# Patient Record
Sex: MALE | Race: WHITE | NOT HISPANIC OR LATINO | Employment: OTHER | URBAN - METROPOLITAN AREA
[De-identification: names, ages, dates, MRNs, and addresses within clinical notes are randomized per-mention and may not be internally consistent; named-entity substitution may affect disease eponyms.]

---

## 2024-04-24 ENCOUNTER — HOSPITAL ENCOUNTER (INPATIENT)
Facility: HOSPITAL | Age: 69
LOS: 2 days | Discharge: HOME | End: 2024-04-26
Attending: EMERGENCY MEDICINE | Admitting: INTERNAL MEDICINE

## 2024-04-24 ENCOUNTER — APPOINTMENT (OUTPATIENT)
Dept: CARDIOLOGY | Facility: HOSPITAL | Age: 69
End: 2024-04-24

## 2024-04-24 ENCOUNTER — APPOINTMENT (OUTPATIENT)
Dept: RADIOLOGY | Facility: HOSPITAL | Age: 69
End: 2024-04-24

## 2024-04-24 DIAGNOSIS — I44.2 ATRIOVENTRICULAR BLOCK, COMPLETE (MULTI): ICD-10-CM

## 2024-04-24 DIAGNOSIS — R00.1 SYMPTOMATIC BRADYCARDIA: Primary | ICD-10-CM

## 2024-04-24 LAB
ALBUMIN SERPL BCP-MCNC: 4.4 G/DL (ref 3.4–5)
ALP SERPL-CCNC: 116 U/L (ref 33–136)
ALT SERPL W P-5'-P-CCNC: 143 U/L (ref 10–52)
ANION GAP SERPL CALC-SCNC: 22 MMOL/L (ref 10–20)
AORTIC VALVE MEAN GRADIENT: 7.3 MMHG
AORTIC VALVE PEAK VELOCITY: 1.88 M/S
AST SERPL W P-5'-P-CCNC: 65 U/L (ref 9–39)
AV PEAK GRADIENT: 14.2 MMHG
AVA (PEAK VEL): 2.44 CM2
AVA (VTI): 2.15 CM2
BASE EXCESS BLDV CALC-SCNC: -8.4 MMOL/L (ref -2–3)
BASOPHILS # BLD AUTO: 0.05 X10*3/UL (ref 0–0.1)
BASOPHILS NFR BLD AUTO: 0.4 %
BILIRUB SERPL-MCNC: 0.5 MG/DL (ref 0–1.2)
BODY TEMPERATURE: ABNORMAL
BUN SERPL-MCNC: 22 MG/DL (ref 6–23)
CALCIUM SERPL-MCNC: 9.8 MG/DL (ref 8.6–10.3)
CARDIAC TROPONIN I PNL SERPL HS: 29 NG/L (ref 0–20)
CARDIAC TROPONIN I PNL SERPL HS: 32 NG/L (ref 0–20)
CHLORIDE SERPL-SCNC: 98 MMOL/L (ref 98–107)
CO2 SERPL-SCNC: 22 MMOL/L (ref 21–32)
CREAT SERPL-MCNC: 1.97 MG/DL (ref 0.5–1.3)
EGFRCR SERPLBLD CKD-EPI 2021: 37 ML/MIN/1.73M*2
EJECTION FRACTION APICAL 4 CHAMBER: 54.6
EOSINOPHIL # BLD AUTO: 0.01 X10*3/UL (ref 0–0.7)
EOSINOPHIL NFR BLD AUTO: 0.1 %
ERYTHROCYTE [DISTWIDTH] IN BLOOD BY AUTOMATED COUNT: 12.9 % (ref 11.5–14.5)
GLUCOSE SERPL-MCNC: 185 MG/DL (ref 74–99)
HCO3 BLDV-SCNC: 19.9 MMOL/L (ref 22–26)
HCT VFR BLD AUTO: 51.5 % (ref 41–52)
HGB BLD-MCNC: 16.5 G/DL (ref 13.5–17.5)
IMM GRANULOCYTES # BLD AUTO: 0.06 X10*3/UL (ref 0–0.7)
IMM GRANULOCYTES NFR BLD AUTO: 0.5 % (ref 0–0.9)
INHALED O2 CONCENTRATION: 95 %
LEFT ATRIUM VOLUME AREA LENGTH INDEX BSA: 23.5 ML/M2
LEFT VENTRICLE INTERNAL DIMENSION DIASTOLE: 4.33 CM (ref 3.5–6)
LEFT VENTRICULAR OUTFLOW TRACT DIAMETER: 2.01 CM
LEVETIRACETAM SERPL-MCNC: 20 UG/ML (ref 10–40)
LV EJECTION FRACTION BIPLANE: 58 %
LYMPHOCYTES # BLD AUTO: 2.09 X10*3/UL (ref 1.2–4.8)
LYMPHOCYTES NFR BLD AUTO: 17 %
MAGNESIUM SERPL-MCNC: 1.91 MG/DL (ref 1.6–2.4)
MCH RBC QN AUTO: 30.6 PG (ref 26–34)
MCHC RBC AUTO-ENTMCNC: 32 G/DL (ref 32–36)
MCV RBC AUTO: 96 FL (ref 80–100)
MITRAL VALVE E/A RATIO: 1.43
MITRAL VALVE E/E' RATIO: 7.43
MONOCYTES # BLD AUTO: 0.6 X10*3/UL (ref 0.1–1)
MONOCYTES NFR BLD AUTO: 4.9 %
NEUTROPHILS # BLD AUTO: 9.47 X10*3/UL (ref 1.2–7.7)
NEUTROPHILS NFR BLD AUTO: 77.1 %
NRBC BLD-RTO: 0 /100 WBCS (ref 0–0)
OXYHGB MFR BLDV: 20.1 % (ref 45–75)
PCO2 BLDV: 51 MM HG (ref 41–51)
PH BLDV: 7.2 PH (ref 7.33–7.43)
PLATELET # BLD AUTO: 405 X10*3/UL (ref 150–450)
PO2 BLDV: 18 MM HG (ref 35–45)
POTASSIUM SERPL-SCNC: 4.2 MMOL/L (ref 3.5–5.3)
PROT SERPL-MCNC: 7.3 G/DL (ref 6.4–8.2)
RBC # BLD AUTO: 5.39 X10*6/UL (ref 4.5–5.9)
RIGHT VENTRICLE FREE WALL PEAK S': 15 CM/S
RIGHT VENTRICLE PEAK SYSTOLIC PRESSURE: 25 MMHG
SAO2 % BLDV: 20 % (ref 45–75)
SODIUM SERPL-SCNC: 138 MMOL/L (ref 136–145)
TRICUSPID ANNULAR PLANE SYSTOLIC EXCURSION: 2.7 CM
WBC # BLD AUTO: 12.3 X10*3/UL (ref 4.4–11.3)

## 2024-04-24 PROCEDURE — 76937 US GUIDE VASCULAR ACCESS: CPT | Performed by: INTERNAL MEDICINE

## 2024-04-24 PROCEDURE — 84484 ASSAY OF TROPONIN QUANT: CPT | Performed by: EMERGENCY MEDICINE

## 2024-04-24 PROCEDURE — 96374 THER/PROPH/DIAG INJ IV PUSH: CPT

## 2024-04-24 PROCEDURE — 99291 CRITICAL CARE FIRST HOUR: CPT | Mod: 25 | Performed by: EMERGENCY MEDICINE

## 2024-04-24 PROCEDURE — C1894 INTRO/SHEATH, NON-LASER: HCPCS | Performed by: INTERNAL MEDICINE

## 2024-04-24 PROCEDURE — 71045 X-RAY EXAM CHEST 1 VIEW: CPT

## 2024-04-24 PROCEDURE — 2500000004 HC RX 250 GENERAL PHARMACY W/ HCPCS (ALT 636 FOR OP/ED): Performed by: EMERGENCY MEDICINE

## 2024-04-24 PROCEDURE — 83735 ASSAY OF MAGNESIUM: CPT | Performed by: EMERGENCY MEDICINE

## 2024-04-24 PROCEDURE — 2780000003 HC OR 278 NO HCPCS: Performed by: INTERNAL MEDICINE

## 2024-04-24 PROCEDURE — 96375 TX/PRO/DX INJ NEW DRUG ADDON: CPT

## 2024-04-24 PROCEDURE — 2500000004 HC RX 250 GENERAL PHARMACY W/ HCPCS (ALT 636 FOR OP/ED)

## 2024-04-24 PROCEDURE — 2500000005 HC RX 250 GENERAL PHARMACY W/O HCPCS: Performed by: INTERNAL MEDICINE

## 2024-04-24 PROCEDURE — 80177 DRUG SCRN QUAN LEVETIRACETAM: CPT | Mod: GEALAB | Performed by: EMERGENCY MEDICINE

## 2024-04-24 PROCEDURE — 33210 INSERT ELECTRD/PM CATH SNGL: CPT | Performed by: INTERNAL MEDICINE

## 2024-04-24 PROCEDURE — 99152 MOD SED SAME PHYS/QHP 5/>YRS: CPT | Performed by: INTERNAL MEDICINE

## 2024-04-24 PROCEDURE — 80053 COMPREHEN METABOLIC PANEL: CPT | Performed by: EMERGENCY MEDICINE

## 2024-04-24 PROCEDURE — 2500000001 HC RX 250 WO HCPCS SELF ADMINISTERED DRUGS (ALT 637 FOR MEDICARE OP)

## 2024-04-24 PROCEDURE — 99291 CRITICAL CARE FIRST HOUR: CPT | Performed by: INTERNAL MEDICINE

## 2024-04-24 PROCEDURE — 2500000004 HC RX 250 GENERAL PHARMACY W/ HCPCS (ALT 636 FOR OP/ED): Performed by: STUDENT IN AN ORGANIZED HEALTH CARE EDUCATION/TRAINING PROGRAM

## 2024-04-24 PROCEDURE — 5A1223Z PERFORMANCE OF CARDIAC PACING, CONTINUOUS: ICD-10-PCS | Performed by: STUDENT IN AN ORGANIZED HEALTH CARE EDUCATION/TRAINING PROGRAM

## 2024-04-24 PROCEDURE — 87081 CULTURE SCREEN ONLY: CPT | Mod: GEALAB

## 2024-04-24 PROCEDURE — 36415 COLL VENOUS BLD VENIPUNCTURE: CPT | Performed by: EMERGENCY MEDICINE

## 2024-04-24 PROCEDURE — 71045 X-RAY EXAM CHEST 1 VIEW: CPT | Performed by: RADIOLOGY

## 2024-04-24 PROCEDURE — C1751 CATH, INF, PER/CENT/MIDLINE: HCPCS | Performed by: INTERNAL MEDICINE

## 2024-04-24 PROCEDURE — 93306 TTE W/DOPPLER COMPLETE: CPT

## 2024-04-24 PROCEDURE — 2500000005 HC RX 250 GENERAL PHARMACY W/O HCPCS

## 2024-04-24 PROCEDURE — 2020000001 HC ICU ROOM DAILY

## 2024-04-24 PROCEDURE — 93010 ELECTROCARDIOGRAM REPORT: CPT | Performed by: INTERNAL MEDICINE

## 2024-04-24 PROCEDURE — 93005 ELECTROCARDIOGRAM TRACING: CPT

## 2024-04-24 PROCEDURE — 85025 COMPLETE CBC W/AUTO DIFF WBC: CPT | Performed by: EMERGENCY MEDICINE

## 2024-04-24 PROCEDURE — 82805 BLOOD GASES W/O2 SATURATION: CPT | Performed by: EMERGENCY MEDICINE

## 2024-04-24 PROCEDURE — S4991 NICOTINE PATCH NONLEGEND: HCPCS

## 2024-04-24 PROCEDURE — 2720000007 HC OR 272 NO HCPCS: Performed by: INTERNAL MEDICINE

## 2024-04-24 PROCEDURE — 2500000004 HC RX 250 GENERAL PHARMACY W/ HCPCS (ALT 636 FOR OP/ED): Performed by: INTERNAL MEDICINE

## 2024-04-24 PROCEDURE — 2500000002 HC RX 250 W HCPCS SELF ADMINISTERED DRUGS (ALT 637 FOR MEDICARE OP, ALT 636 FOR OP/ED)

## 2024-04-24 RX ORDER — SODIUM CHLORIDE 0.9 % (FLUSH) 0.9 %
10 SYRINGE (ML) INJECTION EVERY 12 HOURS
Status: DISCONTINUED | OUTPATIENT
Start: 2024-04-24 | End: 2024-04-25

## 2024-04-24 RX ORDER — LEVETIRACETAM 500 MG/1
TABLET ORAL
Status: COMPLETED
Start: 2024-04-24 | End: 2024-04-24

## 2024-04-24 RX ORDER — LEVETIRACETAM 15 MG/ML
INJECTION INTRAVASCULAR
Status: DISCONTINUED
Start: 2024-04-24 | End: 2024-04-24 | Stop reason: WASHOUT

## 2024-04-24 RX ORDER — NOREPINEPHRINE BITARTRATE 0.03 MG/ML
.01-1 INJECTION, SOLUTION INTRAVENOUS CONTINUOUS
Status: DISCONTINUED | OUTPATIENT
Start: 2024-04-24 | End: 2024-04-24

## 2024-04-24 RX ORDER — LAMOTRIGINE 100 MG/1
100 TABLET ORAL 2 TIMES DAILY
Status: DISCONTINUED | OUTPATIENT
Start: 2024-04-24 | End: 2024-04-24

## 2024-04-24 RX ORDER — ONDANSETRON HYDROCHLORIDE 2 MG/ML
4 INJECTION, SOLUTION INTRAVENOUS ONCE
Status: COMPLETED | OUTPATIENT
Start: 2024-04-24 | End: 2024-04-24

## 2024-04-24 RX ORDER — DOPAMINE HYDROCHLORIDE 160 MG/100ML
5-20 INJECTION, SOLUTION INTRAVENOUS CONTINUOUS
Status: DISCONTINUED | OUTPATIENT
Start: 2024-04-24 | End: 2024-04-24

## 2024-04-24 RX ORDER — DEXTROSE 50 % IN WATER (D50W) INTRAVENOUS SYRINGE
12.5
Status: DISCONTINUED | OUTPATIENT
Start: 2024-04-24 | End: 2024-04-26 | Stop reason: HOSPADM

## 2024-04-24 RX ORDER — ENOXAPARIN SODIUM 100 MG/ML
40 INJECTION SUBCUTANEOUS EVERY 24 HOURS
Status: DISCONTINUED | OUTPATIENT
Start: 2024-04-24 | End: 2024-04-26 | Stop reason: HOSPADM

## 2024-04-24 RX ORDER — LEVETIRACETAM 500 MG/1
500 TABLET ORAL 2 TIMES DAILY
COMMUNITY

## 2024-04-24 RX ORDER — DEXTROSE 50 % IN WATER (D50W) INTRAVENOUS SYRINGE
25
Status: DISCONTINUED | OUTPATIENT
Start: 2024-04-24 | End: 2024-04-26 | Stop reason: HOSPADM

## 2024-04-24 RX ORDER — NOREPINEPHRINE BITARTRATE 0.03 MG/ML
INJECTION, SOLUTION INTRAVENOUS
Status: COMPLETED
Start: 2024-04-24 | End: 2024-04-24

## 2024-04-24 RX ORDER — ONDANSETRON HYDROCHLORIDE 2 MG/ML
INJECTION, SOLUTION INTRAVENOUS
Status: COMPLETED
Start: 2024-04-24 | End: 2024-04-24

## 2024-04-24 RX ORDER — INSULIN LISPRO 100 [IU]/ML
0-5 INJECTION, SOLUTION INTRAVENOUS; SUBCUTANEOUS
Status: DISCONTINUED | OUTPATIENT
Start: 2024-04-25 | End: 2024-04-26 | Stop reason: HOSPADM

## 2024-04-24 RX ORDER — SODIUM CHLORIDE 0.9 % (FLUSH) 0.9 %
10 SYRINGE (ML) INJECTION AS NEEDED
Status: DISCONTINUED | OUTPATIENT
Start: 2024-04-24 | End: 2024-04-26 | Stop reason: HOSPADM

## 2024-04-24 RX ORDER — LORAZEPAM 2 MG/ML
INJECTION INTRAMUSCULAR
Status: DISCONTINUED
Start: 2024-04-24 | End: 2024-04-24 | Stop reason: WASHOUT

## 2024-04-24 RX ORDER — LIDOCAINE HYDROCHLORIDE 20 MG/ML
INJECTION, SOLUTION INFILTRATION; PERINEURAL AS NEEDED
Status: DISCONTINUED | OUTPATIENT
Start: 2024-04-24 | End: 2024-04-24 | Stop reason: HOSPADM

## 2024-04-24 RX ORDER — LEVETIRACETAM 500 MG/1
500 TABLET ORAL 2 TIMES DAILY
Status: DISCONTINUED | OUTPATIENT
Start: 2024-04-24 | End: 2024-04-26 | Stop reason: HOSPADM

## 2024-04-24 RX ORDER — SODIUM CHLORIDE 9 MG/ML
INJECTION, SOLUTION INTRAVENOUS CONTINUOUS PRN
Status: COMPLETED | OUTPATIENT
Start: 2024-04-24 | End: 2024-04-24

## 2024-04-24 RX ORDER — MIDAZOLAM HYDROCHLORIDE 1 MG/ML
INJECTION, SOLUTION INTRAMUSCULAR; INTRAVENOUS AS NEEDED
Status: DISCONTINUED | OUTPATIENT
Start: 2024-04-24 | End: 2024-04-24 | Stop reason: HOSPADM

## 2024-04-24 RX ORDER — IBUPROFEN 200 MG
1 TABLET ORAL DAILY
Status: DISCONTINUED | OUTPATIENT
Start: 2024-04-24 | End: 2024-04-26 | Stop reason: HOSPADM

## 2024-04-24 RX ORDER — FENTANYL CITRATE 50 UG/ML
INJECTION, SOLUTION INTRAMUSCULAR; INTRAVENOUS AS NEEDED
Status: DISCONTINUED | OUTPATIENT
Start: 2024-04-24 | End: 2024-04-24 | Stop reason: HOSPADM

## 2024-04-24 RX ORDER — LAMOTRIGINE 25 MG/1
75 TABLET ORAL 2 TIMES DAILY
Status: ON HOLD | COMMUNITY
End: 2024-05-03

## 2024-04-24 RX ORDER — LAMOTRIGINE 25 MG/1
50 TABLET ORAL 2 TIMES DAILY
Status: DISCONTINUED | OUTPATIENT
Start: 2024-04-25 | End: 2024-04-26 | Stop reason: HOSPADM

## 2024-04-24 RX ADMIN — ENOXAPARIN SODIUM 40 MG: 40 INJECTION SUBCUTANEOUS at 13:45

## 2024-04-24 RX ADMIN — ONDANSETRON HYDROCHLORIDE 4 MG: 2 INJECTION, SOLUTION INTRAVENOUS at 10:04

## 2024-04-24 RX ADMIN — TRIMETHOBENZAMIDE HYDROCHLORIDE 200 MG: 100 INJECTION INTRAMUSCULAR at 23:13

## 2024-04-24 RX ADMIN — DOPAMINE HYDROCHLORIDE 5 MCG/KG/MIN: 160 INJECTION, SOLUTION INTRAVENOUS at 10:35

## 2024-04-24 RX ADMIN — NICOTINE 1 PATCH: 14 PATCH, EXTENDED RELEASE TRANSDERMAL at 17:31

## 2024-04-24 RX ADMIN — LEVETIRACETAM 500 MG: 500 TABLET, FILM COATED ORAL at 18:17

## 2024-04-24 RX ADMIN — NOREPINEPHRINE BITARTRATE 0.01 MCG/KG/MIN: 0.03 INJECTION, SOLUTION INTRAVENOUS at 10:09

## 2024-04-24 RX ADMIN — SODIUM CHLORIDE 1000 ML: 9 INJECTION, SOLUTION INTRAVENOUS at 10:24

## 2024-04-24 RX ADMIN — LAMOTRIGINE 50 MG: 25 TABLET ORAL at 18:13

## 2024-04-24 RX ADMIN — Medication 10 ML: at 20:51

## 2024-04-24 RX ADMIN — ONDANSETRON 4 MG: 2 INJECTION INTRAMUSCULAR; INTRAVENOUS at 10:04

## 2024-04-24 SDOH — SOCIAL STABILITY: SOCIAL INSECURITY: HAVE YOU HAD ANY THOUGHTS OF HARMING ANYONE ELSE?: NO

## 2024-04-24 SDOH — SOCIAL STABILITY: SOCIAL INSECURITY: ARE YOU OR HAVE YOU BEEN THREATENED OR ABUSED PHYSICALLY, EMOTIONALLY, OR SEXUALLY BY ANYONE?: NO

## 2024-04-24 SDOH — SOCIAL STABILITY: SOCIAL INSECURITY: ARE THERE ANY APPARENT SIGNS OF INJURIES/BEHAVIORS THAT COULD BE RELATED TO ABUSE/NEGLECT?: NO

## 2024-04-24 SDOH — SOCIAL STABILITY: SOCIAL INSECURITY: DOES ANYONE TRY TO KEEP YOU FROM HAVING/CONTACTING OTHER FRIENDS OR DOING THINGS OUTSIDE YOUR HOME?: NO

## 2024-04-24 SDOH — SOCIAL STABILITY: SOCIAL INSECURITY: HAS ANYONE EVER THREATENED TO HURT YOUR FAMILY OR YOUR PETS?: NO

## 2024-04-24 SDOH — SOCIAL STABILITY: SOCIAL INSECURITY: DO YOU FEEL ANYONE HAS EXPLOITED OR TAKEN ADVANTAGE OF YOU FINANCIALLY OR OF YOUR PERSONAL PROPERTY?: NO

## 2024-04-24 SDOH — SOCIAL STABILITY: SOCIAL INSECURITY: WERE YOU ABLE TO COMPLETE ALL THE BEHAVIORAL HEALTH SCREENINGS?: YES

## 2024-04-24 SDOH — SOCIAL STABILITY: SOCIAL INSECURITY: ABUSE: ADULT

## 2024-04-24 SDOH — SOCIAL STABILITY: SOCIAL INSECURITY: HAVE YOU HAD THOUGHTS OF HARMING ANYONE ELSE?: NO

## 2024-04-24 SDOH — SOCIAL STABILITY: SOCIAL INSECURITY: DO YOU FEEL UNSAFE GOING BACK TO THE PLACE WHERE YOU ARE LIVING?: NO

## 2024-04-24 ASSESSMENT — LIFESTYLE VARIABLES
EVER HAD A DRINK FIRST THING IN THE MORNING TO STEADY YOUR NERVES TO GET RID OF A HANGOVER: NO
TOTAL SCORE: 0
HOW MANY STANDARD DRINKS CONTAINING ALCOHOL DO YOU HAVE ON A TYPICAL DAY: PATIENT DOES NOT DRINK
EVER FELT BAD OR GUILTY ABOUT YOUR DRINKING: NO
AUDIT-C TOTAL SCORE: 0
SKIP TO QUESTIONS 9-10: 1
HOW OFTEN DO YOU HAVE 6 OR MORE DRINKS ON ONE OCCASION: NEVER
HAVE PEOPLE ANNOYED YOU BY CRITICIZING YOUR DRINKING: NO
AUDIT-C TOTAL SCORE: 0
HAVE YOU EVER FELT YOU SHOULD CUT DOWN ON YOUR DRINKING: NO
HOW OFTEN DO YOU HAVE A DRINK CONTAINING ALCOHOL: NEVER

## 2024-04-24 ASSESSMENT — PATIENT HEALTH QUESTIONNAIRE - PHQ9
SUM OF ALL RESPONSES TO PHQ9 QUESTIONS 1 & 2: 0
1. LITTLE INTEREST OR PLEASURE IN DOING THINGS: NOT AT ALL
2. FEELING DOWN, DEPRESSED OR HOPELESS: NOT AT ALL

## 2024-04-24 ASSESSMENT — COGNITIVE AND FUNCTIONAL STATUS - GENERAL
DAILY ACTIVITIY SCORE: 24
MOBILITY SCORE: 24
PATIENT BASELINE BEDBOUND: NO

## 2024-04-24 ASSESSMENT — ACTIVITIES OF DAILY LIVING (ADL)
HEARING - RIGHT EAR: FUNCTIONAL
ADEQUATE_TO_COMPLETE_ADL: YES
JUDGMENT_ADEQUATE_SAFELY_COMPLETE_DAILY_ACTIVITIES: YES
BATHING: INDEPENDENT
HEARING - LEFT EAR: FUNCTIONAL
PATIENT'S MEMORY ADEQUATE TO SAFELY COMPLETE DAILY ACTIVITIES?: YES
GROOMING: INDEPENDENT
LACK_OF_TRANSPORTATION: NO
FEEDING YOURSELF: INDEPENDENT
DRESSING YOURSELF: INDEPENDENT
WALKS IN HOME: INDEPENDENT
TOILETING: INDEPENDENT

## 2024-04-24 ASSESSMENT — PAIN SCALES - GENERAL
PAINLEVEL_OUTOF10: 0 - NO PAIN
PAINLEVEL_OUTOF10: 3

## 2024-04-24 ASSESSMENT — PAIN - FUNCTIONAL ASSESSMENT
PAIN_FUNCTIONAL_ASSESSMENT: 0-10
PAIN_FUNCTIONAL_ASSESSMENT: 0-10

## 2024-04-24 ASSESSMENT — PAIN DESCRIPTION - LOCATION: LOCATION: NECK

## 2024-04-24 NOTE — Clinical Note
The ECG shows a paced rhythm. The patient has temporary pacemaker. Pacemaker at 5 mA. ECG rate  = 80 bpm.

## 2024-04-24 NOTE — Clinical Note
Temporary pacemaker inserted. Temporary pacemaker location through right internal jugular vein. Temporary pacemaker connected to demand mode. Heart rate: 50. Current 10 (mA).

## 2024-04-24 NOTE — ED PROCEDURE NOTE
Procedure  Critical Care    Performed by: Robert Hein MD  Authorized by: Robert Hein MD    Critical care provider statement:     Critical care time (minutes):  50    Critical care time was exclusive of:  Separately billable procedures and treating other patients    Critical care was necessary to treat or prevent imminent or life-threatening deterioration of the following conditions:  Circulatory failure    Critical care was time spent personally by me on the following activities:  Ordering and performing treatments and interventions, ordering and review of laboratory studies, ordering and review of radiographic studies, pulse oximetry, re-evaluation of patient's condition, transcutaneous pacing, examination of patient, discussions with consultants, development of treatment plan with patient or surrogate and obtaining history from patient or surrogate               Robert Hein MD  04/24/24 0965

## 2024-04-24 NOTE — Clinical Note
The PACEMAKER, GENERATOR, DUAL ASSURITY MRI - [id number] device was inserted. The leads were placed into the connector and visually verified to be in correct position. Injury current obtained.

## 2024-04-24 NOTE — CONSULTS
Inpatient consult to Cardiology  Consult performed by: Mellissa Jones PA-C  Consult ordered by: Robert Hein MD        History Of Present Illness:    Deonte Leavitt is a 69 y.o. male presenting with concern for seizure. Reports that he thought he was having a seizure for the last twelve hours. Upon EMS arrival patient was noted to be bradycardic with HR 20-30, was given Atropine en route without improvement. Upon evaluation in the ED patient being paced @ 70, without capture. Pulse rate @ @26. Was given 2L IVF bolus, started on Levophed with some improvement in BP. Pacing temporarily held with complete heart block noted. Pacing resumed, started on Dopamine with emergent transfer to cath lab for temporary pacing wire placed.   Tolerated the procedure well.      Last Recorded Vitals:  Vitals:    04/24/24 1112 04/24/24 1144 04/24/24 1230 04/24/24 1245   BP:  141/70  129/87   Pulse:  83 80 (!) 159   Resp:  23 23 21   Temp:   36.5 °C (97.7 °F)    TempSrc:   Temporal    SpO2: 94% 96% 93% 94%   Weight:       Height:           Last Labs:  CBC - 4/24/2024:  9:53 AM  12.3 16.5 405    51.5      CMP - 4/24/2024:  9:53 AM  9.8 7.3 65 --- 0.5   _ 4.4 143 116      PTT - No results in last year.  _   _ _     Troponin I, High Sensitivity   Date/Time Value Ref Range Status   04/24/2024 12:51 PM 32 (H) 0 - 20 ng/L Final   04/24/2024 09:53 AM 29 (H) 0 - 20 ng/L Final     LDL Calculated   Date/Time Value Ref Range Status   05/31/2022 12:47 PM 71 65 - 130 MG/DL Final   02/11/2022 03:43  (H) 65 - 130 MG/DL Final      Last I/O:  No intake/output data recorded.    Past Cardiology Tests (Last 3 Years):  EKG:  No results found for this or any previous visit from the past 1095 days.    Echo:  Transthoracic Echocardiogram (02/18/2022):   Exam quality: good  The left ventricular chamber size is normal.  There is normal left ventricular systolic function.  Estimated ejection fraction is 60%.  The left atrial size is normal.  No  "evidence of aortic regurgitation.  No mitral regurgitation.  No evidence of tricuspid regurgitation.      Ejection Fractions:  No results found for: \"EF\"  Cath:  Cardiac Catheterization Procedure 04/24/2024    Stress Test:  No results found for this or any previous visit from the past 1095 days.    Imaging:  CXR (04/24/24):   FINDINGS:  Cardiomegaly. No consolidation, effusion, edema, or pneumothorax.          Past Medical History:  He has a past medical history of Seizures (Multi).    Past Surgical History:  He has a past surgical history that includes Cardiac electrophysiology procedure (N/A, 4/24/2024).      Social History:  He reports that he has been smoking cigarettes. He has never used smokeless tobacco. He reports that he does not currently use alcohol.  Drug: Marijuana.    Family History:  No family history on file.     Allergies:  Patient has no known allergies.    Inpatient Medications:  Scheduled medications   Medication Dose Route Frequency    enoxaparin  40 mg subcutaneous q24h     PRN medications   Medication     Continuous Medications   Medication Dose Last Rate     Outpatient Medications:  Current Outpatient Medications   Medication Instructions    lamoTRIgine (LAMICTAL) 75 mg, oral, 2 times daily, Per pt, cut his dose down without speaking with provider    levETIRAcetam (KEPPRA) 500 mg, oral, 2 times daily       Physical Exam:  Physical Exam  Constitutional:       General: He is in acute distress.      Appearance: He is ill-appearing.   HENT:      Head: Normocephalic.      Nose: Nose normal.      Mouth/Throat:      Mouth: Mucous membranes are moist.   Cardiovascular:      Rate and Rhythm: Bradycardia present.   Pulmonary:      Effort: Respiratory distress present.   Musculoskeletal:      Right lower leg: No edema.      Left lower leg: No edema.   Skin:     Coloration: Skin is pale.   Neurological:      Mental Status: He is disoriented.            Assessment/Plan   Deonte Leavitt is a 68 yo male with a " PMH of Seizure disorder, Carotid stenosis who presented with symptomatic bradycardia, seizures and is s/p temporary pacing placement.     #Complete heart block  #Symptomatic bradycardia    -Echocardiogram recommended  -Temp wire placed  -NPO @ midnight for possible PPM placement  -EP notified  -No home AV carlos blocking agents or antiarrhythmic        Code Status:  Full Code    I spent  minutes in the professional and overall care of this patient.        Mellissa Jones PA-C

## 2024-04-24 NOTE — H&P
"Patient: Deonte Leavitt   Age: 69 y.o.   Gender: male     Room/Bed: 05/05-A     Attending: Nathan Novak MD  Code Status:  Full Code    Chief Complaint:  Patient: Deonte Leavitt is a 69 y.o. male with a past medical history significant for seizures who presented to the hospital after having a seizure.     History of Presenting Illness  He states that he has been dealing with seizure disorders for about 3 years. He has been having episodes of \"seizures\" sporadically during the last 3 years. He states that when he has an episode he will get lightheaded and dizzy and then won't remember what happened. He was started on Keppra but had a side effect to this medication. As a result of the ineffectiveness and side effects from this medication, the decision was made to transition him to Lamictal. Yesterday in the evening he started to have this lightheadedness and dizziness again, he didn't think much of it initially but after dealing with it again this morning he decided to come into the hospital.     Denies chest pain/pressure, abdominal pain, nausea, vomiting, fever, chills, headaches.     Past Medical History:  - Seizures  Past Surgical History:   - None  Family History:   - Non-contributory   Medications:  - Keppra (being weaned off)  - Lamictal 100 mg BID     Social History:   Tobacco Use: High Risk (4/24/2024)    Patient History     Smoking Tobacco Use: Every Day     Smokeless Tobacco Use: Never     Passive Exposure: Not on file      Social History     Substance and Sexual Activity   Alcohol Use Not Currently      ED Course   Vitals -   /87   Pulse (!) 159   Temp 36.5 °C (97.7 °F) (Temporal)   Resp 21   Wt 86.3 kg (190 lb 4.1 oz)   SpO2 94%     Labs:  CBC:  Recent Labs     04/24/24  0953 02/11/22  1543   WBC 12.3* 7.0   HGB 16.5 16.0   HCT 51.5 50.8*    441   MCV 96 96.0     CMP:  Recent Labs     04/24/24  0953 05/31/22  1247 02/11/22  1543    139 137   K 4.2 4.5 4.7   CL 98 99 100   CO2 22 28 " "26   ANIONGAP 22* 12 11   BUN 22 16 19   CREATININE 1.97* 1.2 1.1   EGFR 37* 67 75   GLUCOSE 185* 88 92     Recent Labs     04/24/24  0953 05/31/22  1247 02/11/22  1543   ALBUMIN 4.4 4.3 4.2   ALKPHOS 116 104 101   * 47* 29   AST 65* 25 20   BILITOT 0.5 0.3 0.3     Calcium/Phos:   Lab Results   Component Value Date    CALCIUM 9.8 04/24/2024      ENDO:  Recent Labs     02/11/22  1543   TSH 1.99      CARDIAC:   Recent Labs     04/24/24  1251 04/24/24  0953   TROPHS 32* 29*     Recent Labs     05/31/22  1247 02/11/22  1543   CHOL 159 235*   LDLCALC 71 153*   HDL 66 61   TRIG 110 106     Micro/ID:   No results found for the last 90 days.    Imaging:   - Chest X-Ray: cardiomegaly, but no evidence of acute intrathoracic abnormality.     Interventions: In the ED, the patient was noted to have bradycardia. Prior to arrival to the ED, the patient was given Atropine with minimal improvement. While in the ED, the patient was paced. Dr. Park was contacted in the ED and decision was made to take the patient to the Cath Lab for placement of a temporary pacemaker. Patient had a pacemaker placed on 4/24/2024 and was admitted into the ICU on a Dopamine and Levophed infusion.     Objective Data  Physical Exam  HENT:      Head: Normocephalic and atraumatic.        Comments: Transvenous pacemaker on right side, bandaging over, no swelling or bleeding noted     Mouth/Throat:      Mouth: Mucous membranes are moist.      Pharynx: Oropharynx is clear.   Pulmonary:      Effort: Pulmonary effort is normal.      Breath sounds: Normal breath sounds.   Abdominal:      General: Abdomen is flat. Bowel sounds are normal.      Palpations: Abdomen is soft.   Skin:     General: Skin is warm and dry.   Neurological:      Mental Status: He is alert and oriented to person, place, and time. Mental status is at baseline.          Visit Vitals  /87   Pulse (!) 159   Temp 36.5 °C (97.7 °F) (Temporal)   Resp 21   Ht 1.727 m (5' 8\")   Wt 86.3 " kg (190 lb 4.1 oz)   SpO2 94%   BMI 28.93 kg/m²   Smoking Status Every Day   BSA 2.03 m²        Intake/Output Summary (Last 24 hours) at 4/24/2024 1455  Last data filed at 4/24/2024 1445  Gross per 24 hour   Intake 2110.55 ml   Output 130 ml   Net 1980.55 ml           Current Inpatient Medications   Scheduled medications  enoxaparin, 40 mg, subcutaneous, q24h    Continuous medications     PRN medications     Assessment and Plan   Deonte Leavitt is a 69 y.o. male with a past medical history significant for seizures who presented to the hospital after having a seizure.     Neuro    # Seizure Disorder  - Weaning off of Keppra  - c/w Lamictal 50 mg BID + Keppra 500 mg BID    Cardiovascular    # Symptomatic Bradycardia  - s/p Temporary Pacemaker on 4/24/24  [ ] Repeat CXR in the AM  [ ] ECHO ordered and pending  [ ] Permanent Pacemaker placement   [ ] Cardiology consulted     Respiratory  - No active/acute issues     Gastrointestinal  - No active/acute issues     Renal/    # NESSA   2/2 Pre-Renal likely in the setting of dehydration  - Baseline Cr: 1.1  - Given 1L of NS  [ ] Continue to monitor     Heme/Onc  - No active/acute issues     Endocrine    # Hyperglycemia  [ ] Sliding Scale Insulin    Infectious Disease  - No active/acute issues     MSK/Derm  - No active/acute issues     Fluids: None  Electrolytes: replete as needed  Nutrition: Cardiac Diet  GI Prophylaxis: None  DVT Prophylaxis: Lovenox     Access: PIV's   Pressors: None   Sedation: None     Antibiotics: None  Oxygenation: 2L NC     Dispo: Admitted into the ICU for symptomatic bradycardia s/p pacemaker placement. Expected LOS > 48 hours.     Adam Aguirre DO  PGY-3, Internal Medicine

## 2024-04-24 NOTE — CARE PLAN
The clinical goals for the shift include Pt will remain safe and free of falls during this shift.

## 2024-04-24 NOTE — ED PROVIDER NOTES
HPI   Chief Complaint   Patient presents with    Bradycardia     Pt brought to Ed by [facility]. Called with c/o seizure.  Pt found to be bradycardic in the 20-30s by EMS and c/o sob.  10 second seizure observed by EMS.  Pt from home with wife. Hx of seizures - takes seizure meds.  Denies any meds  for heart.  Pt took 1 edible gummie and smoked pot yesterday.  1 mg atropine given by EMS en route.        Deonte is a 69-year-old male presents after having a seizure.  Has history of seizure disorder and he takes.  Seizure medications Keppra and something else.  EMS reports that his heart rate was very very slow in spite of the monitor reading faster heart rate he is not conducting.  He has low blood pressure as well.  He denies taking any blood pressure medicine.  He does use marijuana I smoked and Gummies both yesterday nothing since then.  He denies any history of heart problems but does see Dr. Latham heart doctor at another hospital.  Denies any fever chills cough or cold.  He denies any chest pain or shortness of breath.  He denies having symptoms like this in the past.  He denies any drug overdoses.                          [place] Coma Scale Score: 15                     Patient History   Past Medical History:   Diagnosis Date    Seizures (Multi)      History reviewed. No pertinent surgical history.  No family history on file.  Social History     Tobacco Use    Smoking status: Not on file    Smokeless tobacco: Not on file   Substance Use Topics    Alcohol use: Not on file    Drug use: Not on file       Physical Exam   ED Triage Vitals   Temp Pulse Resp BP   -- -- -- --      SpO2 Temp src Heart Rate Source Patient Position   -- -- -- --      BP Location FiO2 (%)     -- --       Physical Exam  Vitals reviewed.   Constitutional:       General: He is awake.      Appearance: Normal appearance.   HENT:      Head: Normocephalic.      Nose: Nose normal.   Cardiovascular:      Rate and Rhythm: Bradycardia present.    Pulmonary:      Effort: Pulmonary effort is normal.      Breath sounds: Normal breath sounds.   Abdominal:      Palpations: Abdomen is soft.   Musculoskeletal:      Cervical back: Normal range of motion.   Skin:     General: Skin is warm.   Neurological:      Mental Status: He is alert.         ED Course & MDM   ED Course as of 04/24/24 1052   Wed Apr 24, 2024   0943 Patient has very slow heart rate and is not conducting the beats.  He was given atropine with minimal improvement prehospital.  After evaluating the patient was decided to pace him.  He is conducting a 10 A at rate of 60.  Chest x-ray and labs requested I have placed a call for interventionalists, Dr. Park awaiting his call back. [RZ]   1005 Seen in the ED by Dr. Vince Castillo and Mellissa from the cardiology team and they requested we add Levophed and Zofran for vomiting.  They are determining plan.  Labs are pending. [RZ]   1051 By Dr. Park in the ED who would like to take the patient to the Cath Lab to put a temporary pacemaker in.  He talked to the patient and his wife in the room.  Patient is agreeable. [RZ]      ED Course User Index  [RZ] Robert Hein MD         Diagnoses as of 04/24/24 1052   Symptomatic bradycardia       Medical Decision Making      Procedure  Procedures     Robert Hein MD  04/24/24 1052

## 2024-04-25 ENCOUNTER — APPOINTMENT (OUTPATIENT)
Dept: RADIOLOGY | Facility: HOSPITAL | Age: 69
End: 2024-04-25

## 2024-04-25 PROBLEM — I44.2 ATRIOVENTRICULAR BLOCK, COMPLETE (MULTI): Status: ACTIVE | Noted: 2024-04-24

## 2024-04-25 LAB
ALBUMIN SERPL BCP-MCNC: 3.6 G/DL (ref 3.4–5)
ANION GAP SERPL CALC-SCNC: 10 MMOL/L (ref 10–20)
BUN SERPL-MCNC: 19 MG/DL (ref 6–23)
CALCIUM SERPL-MCNC: 8.6 MG/DL (ref 8.6–10.3)
CHLORIDE SERPL-SCNC: 104 MMOL/L (ref 98–107)
CO2 SERPL-SCNC: 28 MMOL/L (ref 21–32)
CREAT SERPL-MCNC: 1.15 MG/DL (ref 0.5–1.3)
EGFRCR SERPLBLD CKD-EPI 2021: 70 ML/MIN/1.73M*2
ERYTHROCYTE [DISTWIDTH] IN BLOOD BY AUTOMATED COUNT: 13 % (ref 11.5–14.5)
GLUCOSE BLD MANUAL STRIP-MCNC: 169 MG/DL (ref 74–99)
GLUCOSE BLD MANUAL STRIP-MCNC: 92 MG/DL (ref 74–99)
GLUCOSE SERPL-MCNC: 82 MG/DL (ref 74–99)
HCT VFR BLD AUTO: 44.8 % (ref 41–52)
HGB BLD-MCNC: 14.4 G/DL (ref 13.5–17.5)
MAGNESIUM SERPL-MCNC: 1.78 MG/DL (ref 1.6–2.4)
MCH RBC QN AUTO: 30.1 PG (ref 26–34)
MCHC RBC AUTO-ENTMCNC: 32.1 G/DL (ref 32–36)
MCV RBC AUTO: 94 FL (ref 80–100)
NRBC BLD-RTO: 0 /100 WBCS (ref 0–0)
PHOSPHATE SERPL-MCNC: 3.1 MG/DL (ref 2.5–4.9)
PLATELET # BLD AUTO: 281 X10*3/UL (ref 150–450)
POTASSIUM SERPL-SCNC: 3.9 MMOL/L (ref 3.5–5.3)
RBC # BLD AUTO: 4.78 X10*6/UL (ref 4.5–5.9)
SODIUM SERPL-SCNC: 138 MMOL/L (ref 136–145)
WBC # BLD AUTO: 12.9 X10*3/UL (ref 4.4–11.3)

## 2024-04-25 PROCEDURE — 80069 RENAL FUNCTION PANEL: CPT

## 2024-04-25 PROCEDURE — 3E0102A INTRODUCTION OF ANTI-INFECTIVE ENVELOPE INTO SUBCUTANEOUS TISSUE, OPEN APPROACH: ICD-10-PCS | Performed by: STUDENT IN AN ORGANIZED HEALTH CARE EDUCATION/TRAINING PROGRAM

## 2024-04-25 PROCEDURE — 02H63JZ INSERTION OF PACEMAKER LEAD INTO RIGHT ATRIUM, PERCUTANEOUS APPROACH: ICD-10-PCS | Performed by: STUDENT IN AN ORGANIZED HEALTH CARE EDUCATION/TRAINING PROGRAM

## 2024-04-25 PROCEDURE — C1892 INTRO/SHEATH,FIXED,PEEL-AWAY: HCPCS | Performed by: STUDENT IN AN ORGANIZED HEALTH CARE EDUCATION/TRAINING PROGRAM

## 2024-04-25 PROCEDURE — 99291 CRITICAL CARE FIRST HOUR: CPT | Performed by: INTERNAL MEDICINE

## 2024-04-25 PROCEDURE — 71045 X-RAY EXAM CHEST 1 VIEW: CPT | Performed by: STUDENT IN AN ORGANIZED HEALTH CARE EDUCATION/TRAINING PROGRAM

## 2024-04-25 PROCEDURE — 99153 MOD SED SAME PHYS/QHP EA: CPT | Performed by: STUDENT IN AN ORGANIZED HEALTH CARE EDUCATION/TRAINING PROGRAM

## 2024-04-25 PROCEDURE — 33208 INSRT HEART PM ATRIAL & VENT: CPT | Performed by: STUDENT IN AN ORGANIZED HEALTH CARE EDUCATION/TRAINING PROGRAM

## 2024-04-25 PROCEDURE — C1785 PMKR, DUAL, RATE-RESP: HCPCS | Performed by: STUDENT IN AN ORGANIZED HEALTH CARE EDUCATION/TRAINING PROGRAM

## 2024-04-25 PROCEDURE — 2500000004 HC RX 250 GENERAL PHARMACY W/ HCPCS (ALT 636 FOR OP/ED): Mod: JZ | Performed by: STUDENT IN AN ORGANIZED HEALTH CARE EDUCATION/TRAINING PROGRAM

## 2024-04-25 PROCEDURE — 2020000001 HC ICU ROOM DAILY

## 2024-04-25 PROCEDURE — 33210 INSERT ELECTRD/PM CATH SNGL: CPT | Performed by: STUDENT IN AN ORGANIZED HEALTH CARE EDUCATION/TRAINING PROGRAM

## 2024-04-25 PROCEDURE — 85027 COMPLETE CBC AUTOMATED: CPT

## 2024-04-25 PROCEDURE — 2500000001 HC RX 250 WO HCPCS SELF ADMINISTERED DRUGS (ALT 637 FOR MEDICARE OP)

## 2024-04-25 PROCEDURE — 82947 ASSAY GLUCOSE BLOOD QUANT: CPT

## 2024-04-25 PROCEDURE — 71045 X-RAY EXAM CHEST 1 VIEW: CPT

## 2024-04-25 PROCEDURE — 83735 ASSAY OF MAGNESIUM: CPT

## 2024-04-25 PROCEDURE — 99152 MOD SED SAME PHYS/QHP 5/>YRS: CPT | Performed by: STUDENT IN AN ORGANIZED HEALTH CARE EDUCATION/TRAINING PROGRAM

## 2024-04-25 PROCEDURE — 99223 1ST HOSP IP/OBS HIGH 75: CPT | Performed by: STUDENT IN AN ORGANIZED HEALTH CARE EDUCATION/TRAINING PROGRAM

## 2024-04-25 PROCEDURE — 2500000004 HC RX 250 GENERAL PHARMACY W/ HCPCS (ALT 636 FOR OP/ED)

## 2024-04-25 PROCEDURE — 2720000007 HC OR 272 NO HCPCS: Performed by: STUDENT IN AN ORGANIZED HEALTH CARE EDUCATION/TRAINING PROGRAM

## 2024-04-25 PROCEDURE — C1898 LEAD, PMKR, OTHER THAN TRANS: HCPCS | Performed by: STUDENT IN AN ORGANIZED HEALTH CARE EDUCATION/TRAINING PROGRAM

## 2024-04-25 PROCEDURE — 02HK3JZ INSERTION OF PACEMAKER LEAD INTO RIGHT VENTRICLE, PERCUTANEOUS APPROACH: ICD-10-PCS | Performed by: STUDENT IN AN ORGANIZED HEALTH CARE EDUCATION/TRAINING PROGRAM

## 2024-04-25 PROCEDURE — 2780000003 HC OR 278 NO HCPCS: Performed by: STUDENT IN AN ORGANIZED HEALTH CARE EDUCATION/TRAINING PROGRAM

## 2024-04-25 PROCEDURE — 36415 COLL VENOUS BLD VENIPUNCTURE: CPT

## 2024-04-25 PROCEDURE — 0JH606Z INSERTION OF PACEMAKER, DUAL CHAMBER INTO CHEST SUBCUTANEOUS TISSUE AND FASCIA, OPEN APPROACH: ICD-10-PCS | Performed by: STUDENT IN AN ORGANIZED HEALTH CARE EDUCATION/TRAINING PROGRAM

## 2024-04-25 PROCEDURE — C1769 GUIDE WIRE: HCPCS | Performed by: STUDENT IN AN ORGANIZED HEALTH CARE EDUCATION/TRAINING PROGRAM

## 2024-04-25 PROCEDURE — C1889 IMPLANT/INSERT DEVICE, NOC: HCPCS | Performed by: STUDENT IN AN ORGANIZED HEALTH CARE EDUCATION/TRAINING PROGRAM

## 2024-04-25 PROCEDURE — 2750000001 HC OR 275 NO HCPCS: Performed by: STUDENT IN AN ORGANIZED HEALTH CARE EDUCATION/TRAINING PROGRAM

## 2024-04-25 DEVICE — PACING LEAD
Type: IMPLANTABLE DEVICE | Site: HEART | Status: FUNCTIONAL
Brand: TENDRIL™

## 2024-04-25 DEVICE — PULSE GENERATOR
Type: IMPLANTABLE DEVICE | Site: CHEST  WALL | Status: FUNCTIONAL
Brand: ASSURITY MRI™

## 2024-04-25 RX ORDER — MIDAZOLAM HYDROCHLORIDE 1 MG/ML
INJECTION, SOLUTION INTRAMUSCULAR; INTRAVENOUS AS NEEDED
Status: DISCONTINUED | OUTPATIENT
Start: 2024-04-25 | End: 2024-04-25 | Stop reason: HOSPADM

## 2024-04-25 RX ORDER — BUPIVACAINE HYDROCHLORIDE 5 MG/ML
INJECTION, SOLUTION EPIDURAL; INTRACAUDAL AS NEEDED
Status: DISCONTINUED | OUTPATIENT
Start: 2024-04-25 | End: 2024-04-25 | Stop reason: HOSPADM

## 2024-04-25 RX ORDER — VANCOMYCIN HYDROCHLORIDE 1 G/200ML
INJECTION, SOLUTION INTRAVENOUS CONTINUOUS PRN
Status: COMPLETED | OUTPATIENT
Start: 2024-04-25 | End: 2024-04-25

## 2024-04-25 RX ORDER — FENTANYL CITRATE 50 UG/ML
INJECTION, SOLUTION INTRAMUSCULAR; INTRAVENOUS AS NEEDED
Status: DISCONTINUED | OUTPATIENT
Start: 2024-04-25 | End: 2024-04-25 | Stop reason: HOSPADM

## 2024-04-25 RX ADMIN — LEVETIRACETAM 500 MG: 500 TABLET, FILM COATED ORAL at 06:05

## 2024-04-25 RX ADMIN — ENOXAPARIN SODIUM 40 MG: 40 INJECTION SUBCUTANEOUS at 16:37

## 2024-04-25 RX ADMIN — LAMOTRIGINE 50 MG: 25 TABLET ORAL at 17:51

## 2024-04-25 RX ADMIN — LAMOTRIGINE 50 MG: 25 TABLET ORAL at 06:26

## 2024-04-25 RX ADMIN — LEVETIRACETAM 500 MG: 500 TABLET, FILM COATED ORAL at 17:51

## 2024-04-25 ASSESSMENT — PAIN SCALES - GENERAL
PAINLEVEL_OUTOF10: 0 - NO PAIN
PAINLEVEL_OUTOF10: 1
PAINLEVEL_OUTOF10: 0 - NO PAIN

## 2024-04-25 ASSESSMENT — PAIN - FUNCTIONAL ASSESSMENT
PAIN_FUNCTIONAL_ASSESSMENT: 0-10

## 2024-04-25 NOTE — CARE PLAN
The patient's goals for the shift include      The clinical goals for the shift include Patient will remain hemodynamically stable throughout the shift.    Over the shift, the patient did not make progress toward the following goals. Barriers to progression include . Recommendations to address these barriers include   Problem: Safety - Adult  Goal: Free from fall injury  Outcome: Progressing     Problem: Skin  Goal: Promote/optimize nutrition  Outcome: Progressing   .

## 2024-04-25 NOTE — CONSULTS
"Inpatient consult to cardiology  Consult performed by: Sandoval Jennings MD  Consult ordered by: Mellissa Jones PA-C  Reason for consult: Complete heart block        History Of Present Illness:    Deonte Leavitt is a 69 y.o. male with complete heart block s/p temporary pacemaker. He has a history of seizures. [place] [facility] [place] patient was noted to be bradycardic with HR 20-30, was given atropine en route without improvement. Echo was performed showing normal LVEF. He also had NESSA, now resolved.     Last Recorded Vitals:  Vitals:    04/25/24 0600 04/25/24 0700 04/25/24 0800 04/25/24 0900   BP: 123/74 136/72 (!) 129/91 131/72   Pulse: 92 92 (!) 124 (!) 131   Resp: 24 23 25 25   Temp:   37 °C (98.6 °F)    TempSrc:   Temporal    SpO2: 90% 91% 91% 92%   Weight: 82.1 kg (181 lb 1.6 oz)  82.1 kg (181 lb)    Height:           Last Labs:  CBC - 4/25/2024:  5:26 AM  12.9 14.4 281    44.8      CMP - 4/25/2024:  5:26 AM  8.6 7.3 65 --- 0.5   3.1 3.6 143 116      PTT - No results in last year.  _   _ _     Troponin I, High Sensitivity   Date/Time Value Ref Range Status   04/24/2024 12:51 PM 32 (H) 0 - 20 ng/L Final   04/24/2024 09:53 AM 29 (H) 0 - 20 ng/L Final     LDL Calculated   Date/Time Value Ref Range Status   05/31/2022 12:47 PM 71 65 - 130 MG/DL Final   02/11/2022 03:43  (H) 65 - 130 MG/DL Final      Last I/O:  I/O last 3 completed shifts:  In: 2790.6 (34 mL/kg) [P.O.:780; I.V.:210.6 (2.6 mL/kg); IV Piggyback:1800]  Out: 1380 (16.8 mL/kg) [Urine:1375 (0.5 mL/kg/hr); Blood:5]  Weight: 82.1 kg     Past Cardiology Tests (Last 3 Years):  EKG:  No results found for this or any previous visit from the past 1095 days.    Echo:  Transthoracic Echo (TTE) Complete 04/24/2024    Ejection Fractions:  No results found for: \"EF\"  Cath:  Cardiac Catheterization Procedure 04/24/2024    Stress Test:  No results found for this or any previous visit from the past 1095 days.    Cardiac Imaging:  No " results found for this or any previous visit from the past 1095 days.      Past Medical History:  He has a past medical history of Seizures (Multi).    Past Surgical History:  He has a past surgical history that includes Cardiac electrophysiology procedure (N/A, 4/24/2024).      Social History:  He reports that he has been smoking cigarettes. He has never used smokeless tobacco. He reports that he does not currently use alcohol.  Drug: Marijuana.    Family History:  No family history on file.     Allergies:  Patient has no known allergies.    Inpatient Medications:  Scheduled medications   Medication Dose Route Frequency    enoxaparin  40 mg subcutaneous q24h    insulin lispro  0-5 Units subcutaneous TID with meals    lamoTRIgine  50 mg oral BID    levETIRAcetam  500 mg oral BID    nicotine  1 patch transdermal Daily    sodium chloride 0.9%  10 mL intra-catheter q12h     PRN medications   Medication    alteplase    dextrose    dextrose    glucagon    glucagon    sodium chloride 0.9%    trimethobenzamide     Continuous Medications   Medication Dose Last Rate     Outpatient Medications:  Current Outpatient Medications   Medication Instructions    lamoTRIgine (LAMICTAL) 75 mg, oral, 2 times daily, Per pt, cut his dose down without speaking with provider    levETIRAcetam (KEPPRA) 500 mg, oral, 2 times daily       Physical Exam:  Constitutional: patient is alert and cooperative with exam  skin: dry and warm  Eyes: EOMI and clear sclera  ENMT: moist mucous membranes, forehead bruising  Head/Neck: normal neck, no JVD and trachea midline  Respiratory/Thorax: Clear to auscultation bilaterally, no wheezing or crackles appreciated  Cardiovascular: regular rate and rhythm, S1 and S2 present, no murmurs heard  Gastrointestinal: bowel sounds present, no pain or tenderness upon palpation, soft and nondistended  Extremities: no le edema  Neurological: A&Ox3 no focal deficit     Echo 04/24/2024    CONCLUSIONS:   1. Left ventricular  systolic function is normal with a 55-60% estimated ejection fraction.   2. Abnormal septal motion consistent with RV pacemaker.   3. Aortic valve sclerosis.      Assessment/Plan   I had a conversation with the patient about the findings. I explained to him about the risks and benefits of a permanent pacemaker implantation and described the procedure in detail. The patient would like to proceed.       Peripheral IV 04/24/24 20 G Left Antecubital (Active)   Site Assessment Clean;Dry;Intact 04/25/24 0738   Dressing Status Clean;Dry 04/25/24 0738   Number of days: 1       Peripheral IV 04/24/24 20 G Distal;Right;Anterior Forearm (Active)   Site Assessment Clean;Dry;Intact 04/25/24 0738   Dressing Status Clean;Dry 04/25/24 0738   Number of days: 1       Pacer Wires (Active)   Site Assessment Clean;Dry 04/25/24 0720   Dressing Status Clean;Dry 04/25/24 0720   Number of days: 1       Code Status:  Full Code          Sandoval Geronimo MD

## 2024-04-25 NOTE — PROGRESS NOTES
04/25/24 0954   Discharge Planning   Living Arrangements Spouse/significant other   Support Systems Spouse/significant other   Assistance Needed A&Ox3, independent with ADLS, no DME, room air at baseline, drives   Type of Residence Private residence   Number of Stairs to Enter Residence 1   Number of Stairs Within Residence 1   Do you have animals or pets at home? Yes   Type of Animals or Pets 4-5 feral cats   Home or Post Acute Services None   Patient expects to be discharged to: Home no needs   Does the patient need discharge transport arranged? No     04/25/2024 0954: Spoke to the patient at the bedside. Patient prefers to return home when medically ready and denies any discharge needs.

## 2024-04-25 NOTE — POST-PROCEDURE NOTE
Physician Transition of Care Summary  Invasive Cardiovascular Lab    Procedure Date: 4/25/2024  Attending:    * Sandoval Jennings - Primary  Resident/Fellow/Other Assistant: Surgeons and Role:  * No surgeons found with a matching role *    Indications:   Pre-op Diagnosis     * Symptomatic bradycardia [R00.1]     * Atrioventricular block, complete (Multi) [I44.2]    Post-procedure diagnosis:   Post-op Diagnosis     * Symptomatic bradycardia [R00.1]     * Atrioventricular block, complete (Multi) [I44.2]    Procedure(s):     * PPM IMPLANT DUAL  OR INS NEW/RPLCMT PRM PM W/TRANSV ELTRD ATRIAL&VENT [51085]  OR MOD SED SAME PHYS/QHP INITIAL 15 MINS 5/> YRS [22582]  OR MOD SED SAME PHYS/QHP EACH ADDL 15 MINS [94165]    Procedure Findings:   See below    Description of the Procedure:   Dual chamber pacemaker implantation    Procedures:  Implant of dual chamber PPM ([id number])    Patient history:  Please refer to the detailed history and physical on the patient's medical chart.     Procedure narrative:  The patient was in the fasting state with a temporary pacing wire from his right IJ. A grounding pad was placed. The patient was set up for continuous monitoring of surface 12 lead ECG and pulse oximetry. Blood pressure was monitored. The procedure was performed under moderate sedation. The left upper chest was prepped and draped in the usual sterile fashion. Local anesthesia: After preoperative IV antibiotic was completely infused, subcutaneous tissues just medial to the left deltopectoral area, were infiltrated with bupivacaine for local anesthesia.   Using a scalpel, an incision was made, which was extended to the left prepectoral fascia using blunt dissection. A pulse generator pocket was created.  The Left cephalic vein was identified, the distal end was tied off, and using Nuñez scissors, a venotomy was created. A wire (0.035 Glidewire) was advanced through the venotomy.   A 6 F sheath was placed over of the guidewires.  The RV pacing lead was then advanced to the heart via fluoroscopic guidance. The ventricle was mapped, and the lead was fixed to the right ventricular apex. After lead placement, appropriate sensing and thresholds were obtained. The sheath was peeled away.  A second 6 F sheath was advanced over the guidewire, and the dilator and guidewire were removed. Under fluoroscopic guidance, a pacing lead was advanced to the heart, and the atrium was mapped. The lead was fixed to the right atrial appendage. The sheath was peeled away.  The leads were sutured in place to the pectoralis muscle using 0 Tycron suture. Lead measurements were obtained.  A dual chamber pacemaker pulse generator was attached to the leads and implanted using a Tyrx pouch.  The device was interrogated and its parameters recorded; telemetered electrograms and pacing and sensing thresholds were measured.   The temporary pacing wire was removed.  The pocket was flushed with Vancomycin solution. The wound was closed in three layers using. a 0 Vicryl interrupted layer, followed by a 3.0 Vicryl  continuous layer, and a 4.0 Vicryl continuous layer for the subcuticular layer. Steri-Strips and a dressing were applied.      Final Implant Settings:    Lead Parameters  Atrial: 650 ohms, P wave 3.1 mV, threshold 1.5 V@0.4msec  RVent: 1025 ohms, R wave 5.9 mV, threshold 1 V@0.4msec,    Summary:  Successful implantation of a dual chamber pacemaker ([employer]).      Patient Instructions:  Please do not lift left arm above shoulder level for 4-5 weeks  No repetitive motion or lifting heavy weight for 4-5 weeks  No driving, alcohol or making legal decisions for 24 hours.  Keep wound completely dry for 7 days; may shower but keep bandage as dry as possible.  No soaking in hot tubs or baths for 10 days. May sponge bath  Keep bandage on incision for 1 week.  Allow steristrips underneath to fall off naturally.    Please call our office if you notice any discharge or swelling  around incision or fever.    Follow up:   The patient should be alert for bleeding, swelling, or signs of infection. The patient should call the electrophysiologist immediately if symptoms recur, or for any problems. The patient and family (with HIPPA consent)  have been instructed accordingly.   Follow up in Clinic for wound check as scheduled. Device clinic follow up will then be scheduled. Remote monitoring will be instituted if possible.     See complete procedural log and parameters.     Complications:   None    Stents/Implants:   Implants       Pacemaker    Lead, Tendril, Sts Domestic, 58cm - [id number] - [id number] - Implanted        Inventory item: LEAD, TENDRIL, STS DOMESTIC, 58CM Model/Cat number: 2088TC/58    Serial number: [id number] : [employer]    Lot number: [id number] Device identifier: [id number]    Implant Date: 4/25/2024        As of 4/25/2024       Status: Implanted                      Lead, Tendril, Sts Domestic, 52cm - [id number] - [id number] - Implanted        Inventory item: LEAD, TENDRIL, STS DOMESTIC, 52CM Model/Cat number: [id number]/52    Serial number: [id number] : [employer]    Lot number: [id number] Device identifier: [id number]    Implant Date: 4/25/2024        As of 4/25/2024       Status: Implanted                      Pacemaker, Generator, Dual Assurity Mri - [id number] - [id number] - Implanted        Inventory item: PACEMAKER, GENERATOR, DUAL ASSURITY MRI Model/Cat number: [id number]    Serial number: [id number] : [employer]    Lot number: [id number] Device identifier: [id number]    Implant Date: 4/25/2024        As of 4/25/2024       Status: Implanted                              Estimated Blood Loss:   10 mL    Anesthesia: Moderate Sedation Anesthesia Staff: No anesthesia staff entered.    Any Specimen(s) Removed:   No specimens collected during this procedure.      Electronically signed by: Sandoval Geronimo MD, 4/25/2024 4:17 PM

## 2024-04-25 NOTE — PROGRESS NOTES
Occupational Therapy                 Therapy Communication Note    Patient Name: Deonte Leavitt  MRN: [id number]  Today's Date: 4/25/2024     Discipline: Occupational Therapy    Missed Visit Reason: Missed Visit Reason: Patient placed on medical hold (Discussed with RN and PT at 0905, pt anticipates OR for pacemaker placement today. No eval completed at this time, will follow when medically appropriate post-op.)    Missed Time: Attempt

## 2024-04-25 NOTE — CARE PLAN
SW consulted due to self-pay status.  Met with 70 yo pt who thought one had to be getting Social Security in order to get on Medicare and therefore did not apply at age 67.  Giving pt/wife information re: applying for Medicare.  Also giving  Financial Assistance application.  [facility] to assess.

## 2024-04-25 NOTE — PROGRESS NOTES
Physical Therapy                 Therapy Communication Note    Patient Name: Deonte Leavitt  MRN: [id number]  Today's Date: 4/25/2024     Discipline: Physical Therapy    Missed Visit Reason: Missed Visit Reason: Patient placed on medical hold (Pt planned to go for pacemaker placement today, will follow up post op as appropriate.)    Missed Time: Attempt    Comment:

## 2024-04-25 NOTE — PROGRESS NOTES
"Subjective Data:  No events feels good  Denies chest pain or sob  No dizziness or syncope  Denies prior cardiac issues  Denies tick bit, recent illness or infection    Paced via TVP     Objective Data:  Last Recorded Vitals:  Vitals:    04/25/24 0600 04/25/24 0700 04/25/24 0800 04/25/24 0900   BP: 123/74 136/72 (!) 129/91 131/72   Pulse: 92 92 (!) 124 (!) 131   Resp: 24 23 25 25   Temp:       TempSrc:       SpO2: 90% 91% 91% 92%   Weight: 82.1 kg (181 lb 1.6 oz)      Height:           Last Labs:  CBC - 4/25/2024:  5:26 AM  12.9 14.4 281    44.8      CMP - 4/25/2024:  5:26 AM  8.6 7.3 65 --- 0.5   3.1 3.6 143 116      PTT - No results in last year.  _   _ _     TROPHS   Date/Time Value Ref Range Status   04/24/2024 12:51 PM 32 0 - 20 ng/L Final   04/24/2024 09:53 AM 29 0 - 20 ng/L Final     LDLCALC   Date/Time Value Ref Range Status   05/31/2022 12:47 PM 71 65 - 130 MG/DL Final   02/11/2022 03:43  65 - 130 MG/DL Final      Last I/O:  I/O last 3 completed shifts:  In: 2790.6 (34 mL/kg) [P.O.:780; I.V.:210.6 (2.6 mL/kg); IV Piggyback:1800]  Out: 1380 (16.8 mL/kg) [Urine:1375 (0.5 mL/kg/hr); Blood:5]  Weight: 82.1 kg     Past Cardiology Tests (Last 3 Years):  EKG:  No results found for this or any previous visit from the past 1095 days.    Echo:  Transthoracic Echo (TTE) Complete 04/24/2024    Ejection Fractions:  No results found for: \"EF\"  Cath:  Cardiac Catheterization Procedure 04/24/2024    Stress Test:  No results found for this or any previous visit from the past 1095 days.    Cardiac Imaging:  No results found for this or any previous visit from the past 1095 days.      Inpatient Medications:  Scheduled medications   Medication Dose Route Frequency    enoxaparin  40 mg subcutaneous q24h    insulin lispro  0-5 Units subcutaneous TID with meals    lamoTRIgine  50 mg oral BID    levETIRAcetam  500 mg oral BID    nicotine  1 patch transdermal Daily    sodium chloride 0.9%  10 mL intra-catheter q12h     PRN " medications   Medication    alteplase    dextrose    dextrose    glucagon    glucagon    sodium chloride 0.9%    trimethobenzamide     Continuous Medications   Medication Dose Last Rate       Physical Exam:  Vitals:    04/25/24 0900   BP: 131/72   Pulse: (!) 131   Resp: 25   Temp:    SpO2: 92%   Constitutional: patient is alert and cooperative with exam  skin: dry and warm  Eyes: EOMI and clear sclera  ENMT: moist mucous membranes, forehead bruising  Head/Neck: normal neck, no JVD and trachea midline  Respiratory/Thorax: Clear to auscultation bilaterally, no wheezing or crackles appreciated  Cardiovascular: regular rate and rhythm, S1 and S2 present, no murmurs heard  Gastrointestinal: bowel sounds present, no pain or tenderness upon palpation, soft and nondistended  Extremities: no le edema  Neurological: A&Ox3 no focal deficit           Assessment/Plan     Deonte Leavitt is a 68 yo male with a PMH of Seizure disorder, Carotid stenosis who presented with symptomatic bradycardia, seizures and is s/p temporary pacing placement.      #Complete heart block  #Symptomatic bradycardia  #preserved EF   #shock, resolved  #NESSA resolved      Plan  -Temp wire in place  -PPM placement today   -No home AV carlos blocking agents or antiarrhythmic   -discussed with EP         Peripheral IV 04/24/24 20 G Left Antecubital (Active)   Site Assessment Clean;Dry;Intact 04/25/24 0738   Dressing Status Clean;Dry 04/25/24 0738   Number of days: 1       Peripheral IV 04/24/24 20 G Distal;Right;Anterior Forearm (Active)   Site Assessment Clean;Dry;Intact 04/25/24 0738   Dressing Status Clean;Dry 04/25/24 0738   Number of days: 1       Pacer Wires (Active)   Site Assessment Clean;Dry 04/25/24 0720   Dressing Status Clean;Dry 04/25/24 0720   Number of days: 1       Code Status:  Full Code    I spent 45 minutes in the professional and overall care of this patient.        Sharath Henry MD

## 2024-04-25 NOTE — PROGRESS NOTES
Patient: Deonte Leavitt   Age: 69 y.o.   Gender: male  Room/bed: 05/05-A     Attending: Nathan Novak MD  Code Status:  Full Code    Overnight Events  None  Subjective  Seen and examined at the bedside and appeared calm. He states that overall he is doing well with no new issues.    Denies chest pain/pressure, abdominal pain, nausea, vomiting, fever, chills, headaches.     Objective:  Physical Exam   HENT:      Head: Normocephalic and atraumatic.        Comments: Transvenous pacemaker on right side, bandaging over, no swelling or bleeding noted     Mouth/Throat:      Mouth: Mucous membranes are moist.      Pharynx: Oropharynx is clear.   Pulmonary:      Effort: Pulmonary effort is normal.      Breath sounds: Normal breath sounds.   Abdominal:      General: Abdomen is flat. Bowel sounds are normal.      Palpations: Abdomen is soft.   Skin:     General: Skin is warm and dry.   Neurological:      Mental Status: He is alert and oriented to person, place, and time. Mental status is at baseline.     Temp:  [36.5 °C (97.7 °F)-37.3 °C (99.1 °F)] 37 °C (98.6 °F)  Heart Rate:  [] 131  Resp:  [15-28] 25  BP: (103-149)/() 131/72      Intake/Output Summary (Last 24 hours) at 4/25/2024 1210  Last data filed at 4/25/2024 1036  Gross per 24 hour   Intake 2030.55 ml   Output 1475 ml   Net 555.55 ml       Vitals:    04/25/24 0800   Weight: 82.1 kg (181 lb)     Labs:   CBC:  Recent Labs     04/25/24 0526 04/24/24  0953 02/11/22  1543   WBC 12.9* 12.3* 7.0   HGB 14.4 16.5 16.0   HCT 44.8 51.5 50.8*    405 441   MCV 94 96 96.0     CMP:  Recent Labs     04/25/24  0526 04/24/24  0953 05/31/22  1247    138 139   K 3.9 4.2 4.5    98 99   CO2 28 22 28   ANIONGAP 10 22* 12   BUN 19 22 16   CREATININE 1.15 1.97* 1.2   EGFR 70 37* 67   GLUCOSE 82 185* 88     Recent Labs     04/25/24  0526 04/24/24  0953 05/31/22  1247 02/11/22  1543   ALBUMIN 3.6 4.4 4.3 4.2   ALKPHOS  --  116 104 101   ALT  --  143* 47* 29   AST   --  65* 25 20   BILITOT  --  0.5 0.3 0.3     Calcium/Phos:   Lab Results   Component Value Date    CALCIUM 8.6 04/25/2024    PHOS 3.1 04/25/2024      ENDO:  Recent Labs     02/11/22  1543   TSH 1.99      CARDIAC:   Recent Labs     04/24/24  1251 04/24/24  0953   TROPHS 32* 29*     Recent Labs     05/31/22  1247 02/11/22  1543   CHOL 159 235*   LDLCALC 71 153*   HDL 66 61   TRIG 110 106     No data recorded    Micro/ID:   No results found for the last 90 days.    Images:  XR chest 1 view  Narrative: Interpreted By:  Paz Garcia,   STUDY:  XR CHEST 1 VIEW; ;  4/25/2024 6:31 am      INDICATION:  Signs/Symptoms:pacemaker.      COMPARISON:  Chest radiograph dated 04/24/2024      ACCESSION NUMBER(S):  [id number]      ORDERING CLINICIAN:  VALENTINA MARIA      FINDINGS:  Cardiac silhouette is mildly enlarged but is similar compared to  prior radiograph. There is interval placement of right chest wall  pacemaker with a single lead extending to the heart. There is  interval improvement in bilateral lung interstitial markings compared  to prior radiograph with mild residual. No large pleural effusions or  pneumothorax within limits of portable technique. No acute osseous  findings.      Impression: 1. Interval placement of right chest wall pacemaker without definite  evidence of pneumothorax.  2. Interval improvement in interstitial edema with mild distal  compared to prior radiograph.              MACRO:  None      Signed by: Paz Garcia 4/25/2024 8:30 AM  Dictation workstation:   [id number]     Medications:  Scheduled medications  enoxaparin, 40 mg, subcutaneous, q24h  insulin lispro, 0-5 Units, subcutaneous, TID with meals  lamoTRIgine, 50 mg, oral, BID  levETIRAcetam, 500 mg, oral, BID  nicotine, 1 patch, transdermal, Daily  sodium chloride 0.9%, 10 mL, intra-catheter, q12h      Continuous medications     PRN medications  PRN medications: alteplase, dextrose, dextrose, glucagon, glucagon, sodium chloride 0.9%,  trimethobenzamide     Deonte Leavitt is a 69 y.o. male with a past medical history significant for seizures who presented to the hospital after having a seizure.      Neuro     # Seizure Disorder  - Weaning off of Keppra  - c/w Lamictal 50 mg BID + Keppra 500 mg BID     Cardiovascular     # Symptomatic Bradycardia and Heart Block  - s/p Temporary Pacemaker on 4/24/24  [ ] Repeat CXR in the AM  [ ] Permanent Pacemaker placement today  [ ] Cardiology and EP consulted      Respiratory  - No active/acute issues      Gastrointestinal  - No active/acute issues      Renal/     # NESSA, Improving  2/2 Pre-Renal in the setting of dehydration  - Baseline Cr: 1.1  - Given 1L of NS in the ED  [ ] Continue to monitor      Heme/Onc  - No active/acute issues      Endocrine     # Hyperglycemia  [ ] Sliding Scale Insulin     Infectious Disease  - No active/acute issues      MSK/Derm  - No active/acute issues      Fluids: None  Electrolytes: replete as needed  Nutrition: Cardiac Diet  GI Prophylaxis: None  DVT Prophylaxis: Lovenox      Access: PIV's   Pressors: None   Sedation: None      Antibiotics: None  Oxygenation: 2L NC      Dispo: Admitted into the ICU for symptomatic bradycardia and heart block s/p temporary pacemaker placement. Plan for permanent pacemaker placement today.     Adam Aguirre DO  PGY-3, Internal Medicine

## 2024-04-26 ENCOUNTER — APPOINTMENT (OUTPATIENT)
Dept: RADIOLOGY | Facility: HOSPITAL | Age: 69
End: 2024-04-26

## 2024-04-26 VITALS
DIASTOLIC BLOOD PRESSURE: 80 MMHG | BODY MASS INDEX: 24.62 KG/M2 | HEART RATE: 68 BPM | TEMPERATURE: 97.9 F | OXYGEN SATURATION: 94 % | WEIGHT: 162.48 LBS | SYSTOLIC BLOOD PRESSURE: 132 MMHG | RESPIRATION RATE: 14 BRPM | HEIGHT: 68 IN

## 2024-04-26 PROBLEM — R00.1 SYMPTOMATIC BRADYCARDIA: Status: RESOLVED | Noted: 2024-04-24 | Resolved: 2024-04-26

## 2024-04-26 PROBLEM — I44.2 ATRIOVENTRICULAR BLOCK, COMPLETE (MULTI): Status: RESOLVED | Noted: 2024-04-24 | Resolved: 2024-04-26

## 2024-04-26 LAB
ALBUMIN SERPL BCP-MCNC: 3.6 G/DL (ref 3.4–5)
ANION GAP SERPL CALC-SCNC: 10 MMOL/L (ref 10–20)
BUN SERPL-MCNC: 14 MG/DL (ref 6–23)
CALCIUM SERPL-MCNC: 8.6 MG/DL (ref 8.6–10.3)
CHLORIDE SERPL-SCNC: 98 MMOL/L (ref 98–107)
CO2 SERPL-SCNC: 29 MMOL/L (ref 21–32)
CREAT SERPL-MCNC: 0.94 MG/DL (ref 0.5–1.3)
EGFRCR SERPLBLD CKD-EPI 2021: 89 ML/MIN/1.73M*2
ERYTHROCYTE [DISTWIDTH] IN BLOOD BY AUTOMATED COUNT: 12.7 % (ref 11.5–14.5)
GLUCOSE BLD MANUAL STRIP-MCNC: 112 MG/DL (ref 74–99)
GLUCOSE BLD MANUAL STRIP-MCNC: 89 MG/DL (ref 74–99)
GLUCOSE SERPL-MCNC: 118 MG/DL (ref 74–99)
HCT VFR BLD AUTO: 43.6 % (ref 41–52)
HGB BLD-MCNC: 14.2 G/DL (ref 13.5–17.5)
MAGNESIUM SERPL-MCNC: 1.5 MG/DL (ref 1.6–2.4)
MCH RBC QN AUTO: 30.4 PG (ref 26–34)
MCHC RBC AUTO-ENTMCNC: 32.6 G/DL (ref 32–36)
MCV RBC AUTO: 93 FL (ref 80–100)
NRBC BLD-RTO: 0 /100 WBCS (ref 0–0)
PHOSPHATE SERPL-MCNC: 2.2 MG/DL (ref 2.5–4.9)
PLATELET # BLD AUTO: 262 X10*3/UL (ref 150–450)
POTASSIUM SERPL-SCNC: 3.6 MMOL/L (ref 3.5–5.3)
RBC # BLD AUTO: 4.67 X10*6/UL (ref 4.5–5.9)
SODIUM SERPL-SCNC: 133 MMOL/L (ref 136–145)
STAPHYLOCOCCUS SPEC CULT: NORMAL
WBC # BLD AUTO: 10.9 X10*3/UL (ref 4.4–11.3)

## 2024-04-26 PROCEDURE — 2500000006 HC RX 250 W HCPCS SELF ADMINISTERED DRUGS (ALT 637 FOR ALL PAYERS)

## 2024-04-26 PROCEDURE — 36415 COLL VENOUS BLD VENIPUNCTURE: CPT

## 2024-04-26 PROCEDURE — 71045 X-RAY EXAM CHEST 1 VIEW: CPT

## 2024-04-26 PROCEDURE — 85027 COMPLETE CBC AUTOMATED: CPT

## 2024-04-26 PROCEDURE — 82947 ASSAY GLUCOSE BLOOD QUANT: CPT

## 2024-04-26 PROCEDURE — 2500000004 HC RX 250 GENERAL PHARMACY W/ HCPCS (ALT 636 FOR OP/ED)

## 2024-04-26 PROCEDURE — 71045 X-RAY EXAM CHEST 1 VIEW: CPT | Performed by: STUDENT IN AN ORGANIZED HEALTH CARE EDUCATION/TRAINING PROGRAM

## 2024-04-26 PROCEDURE — 97161 PT EVAL LOW COMPLEX 20 MIN: CPT | Mod: GP

## 2024-04-26 PROCEDURE — 97165 OT EVAL LOW COMPLEX 30 MIN: CPT | Mod: GO

## 2024-04-26 PROCEDURE — 2500000001 HC RX 250 WO HCPCS SELF ADMINISTERED DRUGS (ALT 637 FOR MEDICARE OP)

## 2024-04-26 PROCEDURE — 80069 RENAL FUNCTION PANEL: CPT

## 2024-04-26 PROCEDURE — 99239 HOSP IP/OBS DSCHRG MGMT >30: CPT | Performed by: INTERNAL MEDICINE

## 2024-04-26 PROCEDURE — 83735 ASSAY OF MAGNESIUM: CPT

## 2024-04-26 PROCEDURE — 2500000005 HC RX 250 GENERAL PHARMACY W/O HCPCS

## 2024-04-26 RX ORDER — ACETAMINOPHEN 325 MG/1
TABLET ORAL
Status: COMPLETED
Start: 2024-04-26 | End: 2024-04-26

## 2024-04-26 RX ORDER — CEPHALEXIN 500 MG/1
500 CAPSULE ORAL 4 TIMES DAILY
Qty: 28 CAPSULE | Refills: 0 | Status: SHIPPED | OUTPATIENT
Start: 2024-04-26 | End: 2024-05-03

## 2024-04-26 RX ORDER — AMOXICILLIN 250 MG
1 CAPSULE ORAL 2 TIMES DAILY PRN
Status: DISCONTINUED | OUTPATIENT
Start: 2024-04-26 | End: 2024-04-26 | Stop reason: HOSPADM

## 2024-04-26 RX ORDER — CEPHALEXIN 500 MG/1
500 CAPSULE ORAL 4 TIMES DAILY
Status: DISCONTINUED | OUTPATIENT
Start: 2024-04-26 | End: 2024-04-26 | Stop reason: HOSPADM

## 2024-04-26 RX ORDER — ACETAMINOPHEN 325 MG/1
975 TABLET ORAL EVERY 8 HOURS PRN
Status: DISCONTINUED | OUTPATIENT
Start: 2024-04-26 | End: 2024-04-26 | Stop reason: HOSPADM

## 2024-04-26 RX ORDER — LIDOCAINE 560 MG/1
1 PATCH PERCUTANEOUS; TOPICAL; TRANSDERMAL DAILY
Status: DISCONTINUED | OUTPATIENT
Start: 2024-04-26 | End: 2024-04-26 | Stop reason: HOSPADM

## 2024-04-26 RX ORDER — TALC
3 POWDER (GRAM) TOPICAL NIGHTLY PRN
Status: DISCONTINUED | OUTPATIENT
Start: 2024-04-26 | End: 2024-04-26 | Stop reason: HOSPADM

## 2024-04-26 RX ORDER — POTASSIUM CHLORIDE 20 MEQ/1
40 TABLET, EXTENDED RELEASE ORAL ONCE
Status: COMPLETED | OUTPATIENT
Start: 2024-04-26 | End: 2024-04-26

## 2024-04-26 RX ORDER — MAGNESIUM SULFATE HEPTAHYDRATE 40 MG/ML
2 INJECTION, SOLUTION INTRAVENOUS ONCE
Status: COMPLETED | OUTPATIENT
Start: 2024-04-26 | End: 2024-04-26

## 2024-04-26 RX ADMIN — CEPHALEXIN 500 MG: 500 CAPSULE ORAL at 09:50

## 2024-04-26 RX ADMIN — ACETAMINOPHEN 975 MG: 325 TABLET ORAL at 04:24

## 2024-04-26 RX ADMIN — CEPHALEXIN 500 MG: 500 CAPSULE ORAL at 13:15

## 2024-04-26 RX ADMIN — POTASSIUM CHLORIDE 40 MEQ: 1500 TABLET, EXTENDED RELEASE ORAL at 06:27

## 2024-04-26 RX ADMIN — SODIUM PHOSPHATE, MONOBASIC, MONOHYDRATE AND SODIUM PHOSPHATE, DIBASIC, ANHYDROUS 15 MMOL: 142; 276 INJECTION, SOLUTION INTRAVENOUS at 09:50

## 2024-04-26 RX ADMIN — LAMOTRIGINE 50 MG: 25 TABLET ORAL at 06:09

## 2024-04-26 RX ADMIN — MAGNESIUM SULFATE HEPTAHYDRATE 2 G: 2 INJECTION, SOLUTION INTRAVENOUS at 06:27

## 2024-04-26 RX ADMIN — LEVETIRACETAM 500 MG: 500 TABLET, FILM COATED ORAL at 06:10

## 2024-04-26 ASSESSMENT — PAIN SCALES - GENERAL
PAINLEVEL_OUTOF10: 0 - NO PAIN
PAINLEVEL_OUTOF10: 3
PAINLEVEL_OUTOF10: 0 - NO PAIN

## 2024-04-26 ASSESSMENT — COGNITIVE AND FUNCTIONAL STATUS - GENERAL
MOBILITY SCORE: 22
DAILY ACTIVITIY SCORE: 24
CLIMB 3 TO 5 STEPS WITH RAILING: A LITTLE
WALKING IN HOSPITAL ROOM: A LITTLE

## 2024-04-26 ASSESSMENT — ACTIVITIES OF DAILY LIVING (ADL)
ADL_ASSISTANCE: INDEPENDENT
ADL_ASSISTANCE: INDEPENDENT

## 2024-04-26 ASSESSMENT — PAIN - FUNCTIONAL ASSESSMENT
PAIN_FUNCTIONAL_ASSESSMENT: 0-10

## 2024-04-26 NOTE — CARE PLAN
The patient's goals for the shift include  be discharged  Problem: Arrythmia/Dysrhythmia  Goal: Lab values return to normal range  Outcome: Progressing  Goal: No evidence of post procedure complications  Outcome: Progressing  Goal: Promote self management  Outcome: Progressing  Goal: Serial ECG will return to baseline  Outcome: Progressing  Goal: Verbalize understanding of procedures/devices  Outcome: Progressing  Goal: Vital signs return to baseline  Outcome: Progressing  Goal: Care and maintenance of device (specify)  Outcome: Progressing       The clinical goals for the shift include patient will be seizure free throughout shift

## 2024-04-26 NOTE — PROGRESS NOTES
04/26/24 1049   Discharge Planning   Living Arrangements Spouse/significant other   Support Systems Spouse/significant other   Assistance Needed A&Ox3, independent with ADLS, no DME, room air at baseline, drives   Type of Residence Private residence   Number of Stairs to Enter Residence 1   Number of Stairs Within Residence 1   Do you have animals or pets at home? Yes   Type of Animals or Pets 4-5 feral cats   Home or Post Acute Services None   Patient expects to be discharged to: Home no needs   Does the patient need discharge transport arranged? No     04/26/2024 1050: Patient prefers to return home when medically ready. Denies any discharge needs.     04/26/2024 1314: PT evaluated patient, recommending low intensity therapy. Patient without PCP. Discussed Healthy at Home program and HHC with patient, he declined both. DC 4/26, no needs.

## 2024-04-26 NOTE — HOSPITAL COURSE
Deonte Leavitt is a 69 y.o. male with a past medical history significant for seizures who presented to the hospital after having a seizure.      In the ED, the patient was noted to have bradycardia. Prior to arrival to the ED, the patient was given Atropine with minimal improvement. While in the ED, the patient was paced. Dr. Park was contacted in the ED and decision was made to take the patient to the Cath Lab for placement of a temporary pacemaker. Patient had a pacemaker placed on 4/24/2024 and was admitted into the ICU on a Dopamine and Levophed infusion.     Admitted into the ICU for monitoring following temporary pacemaker placement. Patient was taken for permanent pacemaker placement on 4/25/2024. Procedure was successful and patient remained stable with no episodes of heart block. Patient was deemed stable for discharge with appropriate follow up.    Please take medications as written on medication reconciliation.    Please follow up with:  - PCP  - EP

## 2024-04-26 NOTE — PROGRESS NOTES
"Subjective Data:  Had pPM placed, no issues since then  He feels good  No chest pain, dizziness or sycope     Objective Data:  Last Recorded Vitals:  Vitals:    04/26/24 0500 04/26/24 0600 04/26/24 0700 04/26/24 0800   BP: 134/85 117/65 118/76 129/80   Pulse: 73 64 65 60   Resp: 23 21 20 21   Temp:  36.3 °C (97.3 °F)  36.6 °C (97.9 °F)   TempSrc:  Temporal     SpO2: 93% 90% 92% 92%   Weight:  73.7 kg (162 lb 7.7 oz)     Height:           Last Labs:  CBC - 4/26/2024:  5:11 AM  10.9 14.2 262    43.6      CMP - 4/26/2024:  5:11 AM  8.6 7.3 65 --- 0.5   2.2 3.6 143 116      PTT - No results in last year.  _   _ _     TROPHS   Date/Time Value Ref Range Status   04/24/2024 12:51 PM 32 0 - 20 ng/L Final   04/24/2024 09:53 AM 29 0 - 20 ng/L Final     LDLCALC   Date/Time Value Ref Range Status   05/31/2022 12:47 PM 71 65 - 130 MG/DL Final   02/11/2022 03:43  65 - 130 MG/DL Final      Last I/O:  I/O last 3 completed shifts:  In: 1305 (17.7 mL/kg) [P.O.:1305]  Out: 2085 (28.3 mL/kg) [Urine:2075 (0.8 mL/kg/hr); Blood:10]  Weight: 73.7 kg     Past Cardiology Tests (Last 3 Years):  EKG:  Electrocardiogram, 12-lead PRN ACS symptoms 04/24/2024 (Preliminary)    Echo:  Transthoracic Echo (TTE) Complete 04/24/2024    Ejection Fractions:  No results found for: \"EF\"  Cath:  Cardiac Catheterization Procedure 04/24/2024    Stress Test:  No results found for this or any previous visit from the past 1095 days.    Cardiac Imaging:  No results found for this or any previous visit from the past 1095 days.      Inpatient Medications:  Scheduled medications   Medication Dose Route Frequency    cephalexin  500 mg oral 4x daily    enoxaparin  40 mg subcutaneous q24h    insulin lispro  0-5 Units subcutaneous TID with meals    lamoTRIgine  50 mg oral BID    levETIRAcetam  500 mg oral BID    lidocaine  1 patch transdermal Daily    nicotine  1 patch transdermal Daily    sodium phosphate  15 mmol intravenous Once     PRN medications   Medication "    melatonin    And    acetaminophen    And    sennosides-docusate sodium    alteplase    dextrose    dextrose    glucagon    glucagon    sodium chloride 0.9%    trimethobenzamide     Continuous Medications   Medication Dose Last Rate       Physical Exam:  Vitals:    04/26/24 0800   BP: 129/80   Pulse: 60   Resp: 21   Temp: 36.6 °C (97.9 °F)   SpO2: 92%     Constitutional: patient is alert and cooperative with exam  skin: dry and warm  Eyes: EOMI and clear sclera  ENMT: moist mucous membranes, forehead bruising  Head/Neck: normal neck, no JVD and trachea midline  Respiratory/Thorax: Clear to auscultation bilaterally, no wheezing or crackles appreciated  Cardiovascular: regular rate and rhythm, S1 and S2 present, no murmurs heard. PPM site is good   Gastrointestinal: bowel sounds present, no pain or tenderness upon palpation, soft and nondistended  Extremities: no le edema  Neurological: A&Ox3 no focal deficit      Assessment/Plan   Deonte Leavitt is a 68 yo male with a PMH of Seizure disorder, Carotid stenosis who presented with symptomatic bradycardia, seizures and is s/p temporary pacing placement.      #Complete heart block, symptomatic bradycardia s/p PPM placement 4/25/24  #preserved EF   #shock, resolved  #NESSA resolved      Plan  -No home AV carlos blocking agents or antiarrhythmic   -pending device checkup and CXR, if unremarkable can be dc'ed from cardiac stand point   -discussed with EP   -will arrange for device clinic follow up           Peripheral IV 04/24/24 20 G Left Antecubital (Active)   Site Assessment Clean;Dry;Intact 04/25/24 1923   Dressing Type Transparent 04/25/24 1923   Line Status Flushed 04/25/24 1923   Dressing Status Clean;Dry;Occlusive 04/25/24 1923   Number of days: 2       Peripheral IV 04/24/24 20 G Distal;Right;Anterior Forearm (Active)   Site Assessment Clean;Dry;Intact 04/25/24 1923   Dressing Type Transparent 04/25/24 1923   Line Status Flushed 04/25/24 1923   Dressing Status  Clean;Dry;Occlusive 04/25/24 1923   Number of days: 2       Code Status:  Full Code    I spent  minutes in the professional and overall care of this patient.        Sharath Henry MD

## 2024-04-26 NOTE — PROGRESS NOTES
Physical Therapy    Physical Therapy Evaluation    Patient Name: Deonte Leavitt  MRN: [id number]  Today's Date: 4/26/2024   Time Calculation  Start Time: 1044  Stop Time: 1055  Time Calculation (min): 11 min    Assessment/Plan   PT Assessment  PT Assessment Results: Decreased endurance  Rehab Prognosis: Excellent  Barriers to Discharge: None  Evaluation/Treatment Tolerance: Patient tolerated treatment well  Medical Staff Made Aware: Yes  Strengths: Ability to acquire knowledge, Housing layout, Support of Caregivers  End of Session Communication: Bedside nurse  Assessment Comment: Patient tolerated mobility well, reviewed pacemaker precautions, no concerns related to home going with support from spouse. No skilled PT necessary at this time.  End of Session Patient Position: Up in chair, Alarm off, not on at start of session (No alarm necessary per staff)  IP OR SWING BED PT PLAN  Inpatient or Swing Bed: Inpatient  PT Plan  PT Plan: PT Eval only  PT Eval Only Reason: Safe to return home  PT Discharge Recommendations: No further acute PT  PT Recommended Transfer Status: Assist x1 (d/t lines)  PT - OK to Discharge: Yes (per PT POC)      Subjective   General Visit Information:  General  Reason for Referral: Impaired functional mobility; seizure, pacemaker placement 4/25  Referred By: Nathan Novak  Past Medical History Relevant to Rehab: Seizure  Family/Caregiver Present: Yes (Spouse)  Co-Treatment: OT  Co-Treatment Reason: Optimize functional mobility and patients safety  Prior to Session Communication: Bedside nurse  Patient Position Received: Bed, 3 rail up, Alarm off, not on at start of session  General Comment: Patient pleasant, cooperative and agreeable to therapy assessments  Home Living:  Home Living  Type of Home: House  Lives With: Spouse  Home Adaptive Equipment: None  Home Layout: One level  Home Access: Stairs to enter without rails  Entrance Stairs-Rails: None  Entrance Stairs-Number of Steps: 2  Bathroom  Shower/Tub: Tub/shower unit  Bathroom Toilet: Standard  Bathroom Equipment: None  Prior Level of Function:  Prior Function Per Pt/Caregiver Report  Level of [place]: Independent with ADLs and functional transfers, Independent with homemaking with ambulation  ADL Assistance: Independent  Homemaking Assistance: Independent  Ambulatory Assistance: Independent  Prior Function Comments: (+) driving  Precautions:  Precautions  Medical Precautions: Fall precautions (seizure, tele, pacemaker precautions, IV)    Objective   Pain:  Pain Assessment  Pain Assessment: 0-10  Pain Score: 0 - No pain  Cognition:  Cognition  Overall Cognitive Status: Within Functional Limits  Orientation Level: Oriented X4    General Assessments:    Activity Tolerance  Endurance: Tolerates 10 - 20 min exercise with multiple rests    Sensation  Light Touch: No apparent deficits    Strength  Strength Comments: B LE 5/5    Coordination  Movements are Fluid and Coordinated: Yes    Postural Control  Postural Control: Within Functional Limits    Static Sitting Balance  Static Sitting-Balance Support: No upper extremity supported, Feet supported  Static Sitting-Level of Assistance: Independent    Static Standing Balance  Static Standing-Balance Support: No upper extremity supported  Static Standing-Level of Assistance: Independent  Functional Assessments:       Bed Mobility  Bed Mobility: Yes  Bed Mobility 1  Bed Mobility 1: Supine to sitting  Level of Assistance 1: Independent    Transfer 1  Transfer From 1: Sit to, Stand to  Transfer to 1: Sit, Stand  Technique 1: Sit to stand, Stand to sit  Transfer Level of Assistance 1: Independent    Ambulation/Gait Training  Ambulation/Gait Training Performed: Yes  Ambulation/Gait Training 1  Surface 1: Level tile  Device 1: No device  Assistance 1: Distant supervision  Quality of Gait 1:  (steady)  Comments/Distance (ft) 1: 15'    Outcome Measures:  [facility] Basic Mobility  Turning from your back to your side  while in a flat bed without using bedrails: None  Moving from lying on your back to sitting on the side of a flat bed without using bedrails: None  Moving to and from bed to chair (including a wheelchair): None  Standing up from a chair using your arms (e.g. wheelchair or bedside chair): None  To walk in hospital room: A little  Climbing 3-5 steps with railing: A little  Basic Mobility - Total Score: 22    FSS-ICU  Ambulation: Walks <50 feet with any assistance x1 or walks any distance with assistance x2 people  Rolling: Complete independence  Sitting: Complete independence  Transfer Sit-to-Stand: Complete independence  Transfer Supine-to-Sit: Complete independence  Total Score: 29    Encounter Problems       Encounter Problems (Active)       Pain - Adult              Education Documentation  Precautions, taught by Mariola Hoover PT at 4/26/2024 12:09 PM.  Learner: Significant Other, Patient  Readiness: Acceptance  Method: Explanation  Response: Verbalizes Understanding, Demonstrated Understanding  Comment: Reviewed pacemaker precautions and mobility techniques    Body Mechanics, taught by Mariola Hoover PT at 4/26/2024 12:09 PM.  Learner: Significant Other, Patient  Readiness: Acceptance  Method: Explanation  Response: Verbalizes Understanding, Demonstrated Understanding  Comment: Reviewed pacemaker precautions and mobility techniques    Mobility Training, taught by Mariola Hoover PT at 4/26/2024 12:09 PM.  Learner: Significant Other, Patient  Readiness: Acceptance  Method: Explanation  Response: Verbalizes Understanding, Demonstrated Understanding  Comment: Reviewed pacemaker precautions and mobility techniques    Education Comments  No comments found.

## 2024-04-26 NOTE — PROGRESS NOTES
Occupational Therapy    Evaluation    Patient Name: Deonte Leavitt  MRN: [id number]  Today's Date: 4/26/2024  Time Calculation  Start Time: 1045  Stop Time: 1056  Time Calculation (min): 11 min    Assessment  IP OT Assessment  OT Assessment: Pt has no skilled acute OT needs  Prognosis: Excellent  Barriers to Discharge: None  Evaluation/Treatment Tolerance: Patient tolerated treatment well  Medical Staff Made Aware: Yes  End of Session Communication: Bedside nurse  End of Session Patient Position: Up in chair, Alarm off, not on at start of session (RN okay with no alarm. Wife present in room with patient)  Plan:  No Skilled OT: Independent with ADLs  OT Frequency: OT eval only  OT Discharge Recommendations: No further acute OT, No OT needed after discharge  OT Recommended Transfer Status: Independent  OT - OK to Discharge: Yes (per OT POC)    Subjective   Current Problem:  1. Symptomatic bradycardia  Case Request Cath Lab: Temporary Pacemaker Insertion    Case Request Cath Lab: Temporary Pacemaker Insertion    Cardiac Catheterization Procedure    Cardiac Catheterization Procedure    Transthoracic Echo (TTE) Complete    Transthoracic Echo (TTE) Complete    Case Request EP Lab: PPM BIV Implant    Case Request EP Lab: PPM BIV Implant    Electrophysiology procedure    Electrophysiology procedure    Cardiac Device Check - In Clinic    Cardiac Device Check - In Clinic      2. Atrioventricular block, complete (Multi)  Cardiac Catheterization Procedure    Case Request EP Lab: PPM BIV Implant    Case Request EP Lab: PPM BIV Implant    Electrophysiology procedure    Electrophysiology procedure    Cardiac Device Check - In Clinic    Cardiac Device Check - In Clinic        General:  General  Reason for Referral: Pt is a 70 y/o male who presented to the hospital after having a seizure. Pt underwent placement of pacemaker on 4/25  Past Medical History Relevant to Rehab: PMH: seizures  Family/Caregiver Present: Yes (wife  present)  Co-Treatment: PT  Co-Treatment Reason: to maximize pt's outcomes  Prior to Session Communication: Bedside nurse  Patient Position Received: Bed, 3 rail up, Alarm off, not on at start of session  General Comment: Pt pleasant and agreeable to OT eval.  Precautions:  Medical Precautions: Fall precautions, Seizure precautions (telemetry, pacemaker precautions)     Pain:  Pain Assessment  Pain Assessment: 0-10  Pain Score: 0 - No pain (pt reported just feeling sore where the pacemaker was placed)    Objective   Cognition:  Overall Cognitive Status: Within Functional Limits     Home Living:  Type of Home: House  Lives With: Spouse  Home Adaptive Equipment: None  Home Layout: One level  Home Access: Stairs to enter without rails  Entrance Stairs-Rails: None  Entrance Stairs-Number of Steps: 2  Bathroom Shower/Tub: Tub/shower unit  Bathroom Toilet: Standard  Bathroom Equipment: None   Prior Function:  Level of [place]: Independent with ADLs and functional transfers, Independent with homemaking with ambulation  ADL Assistance: Independent  Homemaking Assistance: Independent  Ambulatory Assistance: Independent  Prior Function Comments: +driving     ADL:  LE Dressing Assistance: Independent  LE Dressing Deficit:  (Pt was able to don underwear and socks in figure four positioning independently)  Activity Tolerance:  Endurance: Tolerates 10 - 20 min exercise with multiple rests  Bed Mobility/Transfers:   Bed Mobility  Bed Mobility: Yes  Bed Mobility 1  Bed Mobility 1: Supine to sitting  Level of Assistance 1: Independent    Transfers  Transfer: Yes  Transfer 1  Transfer From 1: Sit to, Stand to  Transfer to 1: Sit, Stand  Technique 1: Sit to stand, Stand to sit  Transfer Level of Assistance 1: Independent    Functional Mobility:  Functional Mobility  Functional Mobility Performed: Yes  Functional Mobility 1  Surface 1: Level tile  Device 1: No device  Assistance 1: Independent  Comments 1: Pt ambulated around the  bed with no AD independently  Sitting Balance:  Static Sitting Balance  Static Sitting-Balance Support: Feet supported, No upper extremity supported  Static Sitting-Level of Assistance: Independent  Dynamic Sitting Balance  Dynamic Sitting-Balance Support: Feet unsupported, No upper extremity supported  Dynamic Sitting-Balance: Forward lean  Dynamic Sitting-Comments: independent  Standing Balance:  Static Standing Balance  Static Standing-Balance Support: No upper extremity supported  Static Standing-Level of Assistance: Independent  Dynamic Standing Balance  Dynamic Standing-Balance Support: No upper extremity supported  Dynamic Standing-Comments: independent    Sensation:  Light Touch: No apparent deficits  Strength:  Strength Comments: Strength not tested d/t pacemaker precautions. Anticipate B UE's: WFL     Extremities: RUE   RUE : Within Functional Limits and LUE   LUE: Within Functional Limits    Outcome Measures: [facility] Daily Activity  Putting on and taking off regular lower body clothing: None  Bathing (including washing, rinsing, drying): None  Putting on and taking off regular upper body clothing: None  Toileting, which includes using toilet, bedpan or urinal: None  Taking care of personal grooming such as brushing teeth: None  Eating Meals: None  Daily Activity - Total Score: 24      Education Documentation  No documentation found.  Education Comments  No comments found.

## 2024-04-26 NOTE — PROGRESS NOTES
04/26/24 1119   Discharge Planning   Patient expects to be discharged to: [facility] met with pt and wife at bedside to complete HRS forms for Medicaid. [facility] emailed completed HRS forms to [email or web address].

## 2024-04-26 NOTE — DISCHARGE INSTRUCTIONS
Homegoing Instructions After a Pacemaker Implant    Incision:  Leave the surgical dressing in place for 5 days post implant, then you may remove the dressing.  Please keep your incision dry and open to air for one week after implant.  Follow your doctor's instructions about removal of the paper steri strips that may cover your incision.  After one week, you may wash the site with your usual soap and water.   Inspect your incision each day.  If you note increased redness, swelling, or drainage, or if you develop a fever, please call your pacemaker doctor immediately.     Pain:  It is normal for the area around the incision to be tender for a few weeks following surgery.  Pain relievers such as Tylenol or Motrin are usually sufficient for pain relief.  The pain should get better each day, it it gets worse, please contact your pacemaker doctor.    Activity:  First four weeks after implant:  Avoid gross arm movement on the side of your new implant.  Do not raise or stretch your arm above shoulder level.  Do not  weights greater than 15 lbs.  Avoid activities such as golf or swimming for six weeks.  Try to use garments that button down the front.   Avoid pull over shirts.     ID Card:  It is important that you carry your pacemaker ID card with you at all times.  Inform all doctors and healthcare providers that you have a pacemaker.  Until the lead wires are fully healed in your heart, a prophylactic antibiotic may need to be given to you prior to procedures such as tooth extraction or deep cleaning.      Electromagnetic Interference:  Microwave ovens are safe to use.  Cellular telephones should be held to the ear that is opposite your pacemaker.  Please inform any physicians of your pacemaker implant prior to MRI.  Some pacemakers are MRI compatible and some are not.  You should be prepared to show your pacemaker identification card to the security guards at metal detectors,  hand wand detector should be kept away  from the pacemaker.  Read the patient booklet for more information.  Please keep your pacemaker doctor informed about changes in your incision or how you are feeling.    It is your responsibilitiy to make and keep appointments.   After each visit on your way out, make an appointment for your next visit.  Please follow the recommendations of your doctor for the frequency of appointments.  Your good health is your personal responsibility.    -----  Patient Instructions:  Please do not lift left arm above shoulder level for 4-5 weeks  No repetitive motion or lifting heavy weight for 4-5 weeks  No driving, alcohol or making legal decisions for 24 hours.  Keep wound completely dry for 7 days; may shower but keep bandage as dry as possible.  No soaking in hot tubs or baths for 10 days. May sponge bath  Keep bandage on incision for 1 week.  Allow steristrips underneath to fall off naturally.    Please call our office if you notice any discharge or swelling around incision or fever.     Follow up:   The patient should be alert for bleeding, swelling, or signs of infection. The patient should call the electrophysiologist immediately if symptoms recur, or for any problems. The patient and family (with HIPPA consent)  have been instructed accordingly.   Follow up in Clinic for wound check as scheduled. Device clinic follow up will then be scheduled. Remote monitoring will be instituted if possible.

## 2024-04-26 NOTE — DISCHARGE SUMMARY
Discharge Diagnosis  Symptomatic bradycardia    Issues Requiring Follow-Up  - Pacemaker    Discharge Meds     Your medication list        START taking these medications        Instructions Last Dose Given Next Dose Due   cephalexin 500 mg capsule  Commonly known as: Keflex      Take 1 capsule (500 mg) by mouth 4 times a day for 7 days.              CONTINUE taking these medications        Instructions Last Dose Given Next Dose Due   lamoTRIgine 25 mg tablet  Commonly known as: LaMICtal           levETIRAcetam 500 mg tablet  Commonly known as: Keppra                     Where to Get Your Medications        These medications were sent to [id number] - [place], [place] - [address] [address]      Phone: [phone]   cephalexin 500 mg capsule         Test Results Pending At Discharge  Pending Labs       No current pending labs.          Hospital Course  Deonte Leavitt is a 69 y.o. male with a past medical history significant for seizures who presented to the hospital after having a seizure.      In the ED, the patient was noted to have bradycardia. Prior to arrival to the ED, the patient was given Atropine with minimal improvement. While in the ED, the patient was paced. Dr. Park was contacted in the ED and decision was made to take the patient to the Cath Lab for placement of a temporary pacemaker. Patient had a pacemaker placed on 4/24/2024 and was admitted into the ICU on a Dopamine and Levophed infusion.     Admitted into the ICU for monitoring following temporary pacemaker placement. Patient was taken for permanent pacemaker placement on 4/25/2024. Procedure was successful and patient remained stable with no episodes of heart block. Patient was deemed stable for discharge with appropriate follow up.    Please take medications as written on medication reconciliation.    Please follow up with:  - PCP  - EP    Pertinent Physical Exam At Time of Discharge  Physical Exam  HENT:       Head: Normocephalic and atraumatic.      Mouth/Throat:      Mouth: Mucous membranes are moist.      Pharynx: Oropharynx is clear.   Cardiovascular:      Rate and Rhythm: Normal rate and regular rhythm.   Pulmonary:      Effort: Pulmonary effort is normal.      Breath sounds: Normal breath sounds.   Abdominal:      General: Abdomen is flat. Bowel sounds are normal.      Palpations: Abdomen is soft.   Neurological:      Mental Status: He is alert.       Outpatient Follow-Up  Future Appointments   Date Time Provider Department Center   5/7/2024 10:30 AM DI Andersen-CNP [facility]   5/28/2024  3:00 PM [facility] JUVENAL Aguirre DO

## 2024-04-29 NOTE — SIGNIFICANT EVENT
Follow Up Phone Call    Outgoing phone call    Spoke to: Deonte Leavitt Relationship:self   Phone number: [phone]      Outcome: contacted patient/ family   Chief Complaint   Patient presents with    Bradycardia     Pt brought to Ed by [facility]. Called with c/o seizure.  Pt found to be bradycardic in the 20-30s by EMS and c/o sob.  10 second seizure observed by EMS.  Pt from home with wife. Hx of seizures - takes seizure meds.  Denies any meds  for heart.  Pt took 1 edible gummie and smoked pot yesterday.  1 mg atropine given by EMS en route.           Diagnosis:Not applicable  States he is feeling better. States incision is free of redness, pain, swelling or drainage. Denies fever or chills. No further questions or concerns.

## 2024-05-02 ENCOUNTER — CLINICAL SUPPORT (OUTPATIENT)
Dept: EMERGENCY MEDICINE | Facility: HOSPITAL | Age: 69
End: 2024-05-02

## 2024-05-02 ENCOUNTER — HOSPITAL ENCOUNTER (OUTPATIENT)
Facility: HOSPITAL | Age: 69
Setting detail: OBSERVATION
LOS: 1 days | Discharge: HOME | End: 2024-05-04
Attending: EMERGENCY MEDICINE | Admitting: EMERGENCY MEDICINE

## 2024-05-02 ENCOUNTER — APPOINTMENT (OUTPATIENT)
Dept: RADIOLOGY | Facility: HOSPITAL | Age: 69
End: 2024-05-02

## 2024-05-02 ENCOUNTER — HOSPITAL ENCOUNTER (OUTPATIENT)
Dept: CARDIOLOGY | Facility: CLINIC | Age: 69
Discharge: HOME | End: 2024-05-02

## 2024-05-02 DIAGNOSIS — Z95.0 PACEMAKER: Primary | ICD-10-CM

## 2024-05-02 DIAGNOSIS — R56.9 SEIZURES (MULTI): ICD-10-CM

## 2024-05-02 DIAGNOSIS — I44.2 CHB (COMPLETE HEART BLOCK) (MULTI): ICD-10-CM

## 2024-05-02 DIAGNOSIS — I44.2 COMPLETE HEART BLOCK (MULTI): ICD-10-CM

## 2024-05-02 DIAGNOSIS — Z95.0 PRESENCE OF CARDIAC PACEMAKER: ICD-10-CM

## 2024-05-02 LAB
ALBUMIN SERPL BCP-MCNC: 3.9 G/DL (ref 3.4–5)
ALP SERPL-CCNC: 91 U/L (ref 33–136)
ALT SERPL W P-5'-P-CCNC: 30 U/L (ref 10–52)
ANION GAP SERPL CALC-SCNC: 12 MMOL/L (ref 10–20)
AST SERPL W P-5'-P-CCNC: 18 U/L (ref 9–39)
BASOPHILS # BLD AUTO: 0.06 X10*3/UL (ref 0–0.1)
BASOPHILS NFR BLD AUTO: 0.7 %
BILIRUB SERPL-MCNC: 0.3 MG/DL (ref 0–1.2)
BUN SERPL-MCNC: 15 MG/DL (ref 6–23)
CALCIUM SERPL-MCNC: 9.3 MG/DL (ref 8.6–10.6)
CHLORIDE SERPL-SCNC: 100 MMOL/L (ref 98–107)
CO2 SERPL-SCNC: 29 MMOL/L (ref 21–32)
CREAT SERPL-MCNC: 0.82 MG/DL (ref 0.5–1.3)
EGFRCR SERPLBLD CKD-EPI 2021: >90 ML/MIN/1.73M*2
EOSINOPHIL # BLD AUTO: 0.1 X10*3/UL (ref 0–0.7)
EOSINOPHIL NFR BLD AUTO: 1.1 %
ERYTHROCYTE [DISTWIDTH] IN BLOOD BY AUTOMATED COUNT: 12.7 % (ref 11.5–14.5)
GLUCOSE SERPL-MCNC: 75 MG/DL (ref 74–99)
HCT VFR BLD AUTO: 40.3 % (ref 41–52)
HGB BLD-MCNC: 13.4 G/DL (ref 13.5–17.5)
IMM GRANULOCYTES # BLD AUTO: 0.04 X10*3/UL (ref 0–0.7)
IMM GRANULOCYTES NFR BLD AUTO: 0.4 % (ref 0–0.9)
LYMPHOCYTES # BLD AUTO: 2.13 X10*3/UL (ref 1.2–4.8)
LYMPHOCYTES NFR BLD AUTO: 24 %
MAGNESIUM SERPL-MCNC: 1.9 MG/DL (ref 1.6–2.4)
MCH RBC QN AUTO: 30.1 PG (ref 26–34)
MCHC RBC AUTO-ENTMCNC: 33.3 G/DL (ref 32–36)
MCV RBC AUTO: 91 FL (ref 80–100)
MONOCYTES # BLD AUTO: 0.93 X10*3/UL (ref 0.1–1)
MONOCYTES NFR BLD AUTO: 10.5 %
NEUTROPHILS # BLD AUTO: 5.63 X10*3/UL (ref 1.2–7.7)
NEUTROPHILS NFR BLD AUTO: 63.3 %
NRBC BLD-RTO: 0 /100 WBCS (ref 0–0)
PHOSPHATE SERPL-MCNC: 3.5 MG/DL (ref 2.5–4.9)
PLATELET # BLD AUTO: 278 X10*3/UL (ref 150–450)
POTASSIUM SERPL-SCNC: 4.3 MMOL/L (ref 3.5–5.3)
PROT SERPL-MCNC: 6.6 G/DL (ref 6.4–8.2)
RBC # BLD AUTO: 4.45 X10*6/UL (ref 4.5–5.9)
SODIUM SERPL-SCNC: 137 MMOL/L (ref 136–145)
WBC # BLD AUTO: 8.9 X10*3/UL (ref 4.4–11.3)

## 2024-05-02 PROCEDURE — 1200000002 HC GENERAL ROOM WITH TELEMETRY DAILY

## 2024-05-02 PROCEDURE — 36415 COLL VENOUS BLD VENIPUNCTURE: CPT

## 2024-05-02 PROCEDURE — 2500000006 HC RX 250 W HCPCS SELF ADMINISTERED DRUGS (ALT 637 FOR ALL PAYERS)

## 2024-05-02 PROCEDURE — 99223 1ST HOSP IP/OBS HIGH 75: CPT | Performed by: STUDENT IN AN ORGANIZED HEALTH CARE EDUCATION/TRAINING PROGRAM

## 2024-05-02 PROCEDURE — 99285 EMERGENCY DEPT VISIT HI MDM: CPT | Performed by: EMERGENCY MEDICINE

## 2024-05-02 PROCEDURE — 71046 X-RAY EXAM CHEST 2 VIEWS: CPT

## 2024-05-02 PROCEDURE — 99285 EMERGENCY DEPT VISIT HI MDM: CPT | Mod: 25

## 2024-05-02 PROCEDURE — 83735 ASSAY OF MAGNESIUM: CPT

## 2024-05-02 PROCEDURE — 93005 ELECTROCARDIOGRAM TRACING: CPT

## 2024-05-02 PROCEDURE — 85025 COMPLETE CBC W/AUTO DIFF WBC: CPT

## 2024-05-02 PROCEDURE — 84100 ASSAY OF PHOSPHORUS: CPT

## 2024-05-02 PROCEDURE — 2500000001 HC RX 250 WO HCPCS SELF ADMINISTERED DRUGS (ALT 637 FOR MEDICARE OP)

## 2024-05-02 PROCEDURE — 99222 1ST HOSP IP/OBS MODERATE 55: CPT

## 2024-05-02 PROCEDURE — 93010 ELECTROCARDIOGRAM REPORT: CPT | Performed by: EMERGENCY MEDICINE

## 2024-05-02 PROCEDURE — 80053 COMPREHEN METABOLIC PANEL: CPT

## 2024-05-02 PROCEDURE — 71046 X-RAY EXAM CHEST 2 VIEWS: CPT | Mod: FOREIGN READ | Performed by: RADIOLOGY

## 2024-05-02 RX ORDER — LAMOTRIGINE 25 MG/1
50 TABLET ORAL 2 TIMES DAILY
Status: DISCONTINUED | OUTPATIENT
Start: 2024-05-02 | End: 2024-05-04 | Stop reason: HOSPADM

## 2024-05-02 RX ORDER — CEPHALEXIN 500 MG/1
500 CAPSULE ORAL 4 TIMES DAILY
Status: DISCONTINUED | OUTPATIENT
Start: 2024-05-02 | End: 2024-05-04 | Stop reason: HOSPADM

## 2024-05-02 RX ORDER — ACETAMINOPHEN 160 MG/5ML
650 SOLUTION ORAL EVERY 4 HOURS PRN
Status: DISCONTINUED | OUTPATIENT
Start: 2024-05-02 | End: 2024-05-04 | Stop reason: HOSPADM

## 2024-05-02 RX ORDER — LEVETIRACETAM 500 MG/1
500 TABLET ORAL 2 TIMES DAILY
Status: DISCONTINUED | OUTPATIENT
Start: 2024-05-02 | End: 2024-05-04 | Stop reason: HOSPADM

## 2024-05-02 RX ORDER — LORAZEPAM 2 MG/ML
1 INJECTION INTRAMUSCULAR 4 TIMES DAILY PRN
Status: DISCONTINUED | OUTPATIENT
Start: 2024-05-02 | End: 2024-05-04 | Stop reason: HOSPADM

## 2024-05-02 RX ORDER — POLYETHYLENE GLYCOL 3350 17 G/17G
17 POWDER, FOR SOLUTION ORAL DAILY
Status: DISCONTINUED | OUTPATIENT
Start: 2024-05-02 | End: 2024-05-04 | Stop reason: HOSPADM

## 2024-05-02 RX ORDER — ACETAMINOPHEN 650 MG/1
650 SUPPOSITORY RECTAL EVERY 4 HOURS PRN
Status: DISCONTINUED | OUTPATIENT
Start: 2024-05-02 | End: 2024-05-04 | Stop reason: HOSPADM

## 2024-05-02 RX ORDER — TAMSULOSIN HYDROCHLORIDE 0.4 MG/1
0.4 CAPSULE ORAL DAILY
Status: DISCONTINUED | OUTPATIENT
Start: 2024-05-02 | End: 2024-05-04 | Stop reason: HOSPADM

## 2024-05-02 RX ORDER — LAMOTRIGINE 100 MG/1
100 TABLET ORAL 2 TIMES DAILY
COMMUNITY
End: 2024-05-04 | Stop reason: HOSPADM

## 2024-05-02 RX ORDER — ACETAMINOPHEN 325 MG/1
650 TABLET ORAL EVERY 4 HOURS PRN
Status: DISCONTINUED | OUTPATIENT
Start: 2024-05-02 | End: 2024-05-04 | Stop reason: HOSPADM

## 2024-05-02 RX ADMIN — LAMOTRIGINE 50 MG: 100 TABLET ORAL at 17:19

## 2024-05-02 RX ADMIN — CEPHALEXIN 500 MG: 500 CAPSULE ORAL at 23:09

## 2024-05-02 RX ADMIN — CEPHALEXIN 500 MG: 500 CAPSULE ORAL at 17:19

## 2024-05-02 RX ADMIN — LEVETIRACETAM 500 MG: 500 TABLET, FILM COATED ORAL at 17:19

## 2024-05-02 SDOH — ECONOMIC STABILITY: TRANSPORTATION INSECURITY
IN THE PAST 12 MONTHS, HAS THE LACK OF TRANSPORTATION KEPT YOU FROM MEDICAL APPOINTMENTS OR FROM GETTING MEDICATIONS?: NO

## 2024-05-02 SDOH — SOCIAL STABILITY: SOCIAL INSECURITY: HAS ANYONE EVER THREATENED TO HURT YOUR FAMILY OR YOUR PETS?: NO

## 2024-05-02 SDOH — SOCIAL STABILITY: SOCIAL INSECURITY: ARE THERE ANY APPARENT SIGNS OF INJURIES/BEHAVIORS THAT COULD BE RELATED TO ABUSE/NEGLECT?: NO

## 2024-05-02 SDOH — SOCIAL STABILITY: SOCIAL INSECURITY: ABUSE: ADULT

## 2024-05-02 SDOH — SOCIAL STABILITY: SOCIAL INSECURITY: ARE YOU OR HAVE YOU BEEN THREATENED OR ABUSED PHYSICALLY, EMOTIONALLY, OR SEXUALLY BY ANYONE?: NO

## 2024-05-02 SDOH — SOCIAL STABILITY: SOCIAL INSECURITY: DO YOU FEEL ANYONE HAS EXPLOITED OR TAKEN ADVANTAGE OF YOU FINANCIALLY OR OF YOUR PERSONAL PROPERTY?: NO

## 2024-05-02 SDOH — SOCIAL STABILITY: SOCIAL INSECURITY: HAVE YOU HAD THOUGHTS OF HARMING ANYONE ELSE?: NO

## 2024-05-02 SDOH — HEALTH STABILITY: MENTAL HEALTH: HOW OFTEN DO YOU HAVE 6 OR MORE DRINKS ON ONE OCCASION?: NEVER

## 2024-05-02 SDOH — HEALTH STABILITY: MENTAL HEALTH: HOW OFTEN DO YOU HAVE A DRINK CONTAINING ALCOHOL?: NEVER

## 2024-05-02 SDOH — ECONOMIC STABILITY: INCOME INSECURITY: IN THE LAST 12 MONTHS, WAS THERE A TIME WHEN YOU WERE NOT ABLE TO PAY THE MORTGAGE OR RENT ON TIME?: NO

## 2024-05-02 SDOH — ECONOMIC STABILITY: HOUSING INSECURITY
IN THE LAST 12 MONTHS, WAS THERE A TIME WHEN YOU DID NOT HAVE A STEADY PLACE TO SLEEP OR SLEPT IN A SHELTER (INCLUDING NOW)?: NO

## 2024-05-02 SDOH — SOCIAL STABILITY: SOCIAL INSECURITY: DO YOU FEEL UNSAFE GOING BACK TO THE PLACE WHERE YOU ARE LIVING?: NO

## 2024-05-02 SDOH — HEALTH STABILITY: MENTAL HEALTH: HOW MANY STANDARD DRINKS CONTAINING ALCOHOL DO YOU HAVE ON A TYPICAL DAY?: PATIENT DOES NOT DRINK

## 2024-05-02 SDOH — SOCIAL STABILITY: SOCIAL INSECURITY: HAVE YOU HAD ANY THOUGHTS OF HARMING ANYONE ELSE?: NO

## 2024-05-02 SDOH — SOCIAL STABILITY: SOCIAL INSECURITY: DOES ANYONE TRY TO KEEP YOU FROM HAVING/CONTACTING OTHER FRIENDS OR DOING THINGS OUTSIDE YOUR HOME?: NO

## 2024-05-02 SDOH — ECONOMIC STABILITY: INCOME INSECURITY: HOW HARD IS IT FOR YOU TO PAY FOR THE VERY BASICS LIKE FOOD, HOUSING, MEDICAL CARE, AND HEATING?: NOT HARD AT ALL

## 2024-05-02 SDOH — SOCIAL STABILITY: SOCIAL INSECURITY: WERE YOU ABLE TO COMPLETE ALL THE BEHAVIORAL HEALTH SCREENINGS?: YES

## 2024-05-02 SDOH — ECONOMIC STABILITY: TRANSPORTATION INSECURITY
IN THE PAST 12 MONTHS, HAS LACK OF TRANSPORTATION KEPT YOU FROM MEETINGS, WORK, OR FROM GETTING THINGS NEEDED FOR DAILY LIVING?: NO

## 2024-05-02 ASSESSMENT — PAIN - FUNCTIONAL ASSESSMENT
PAIN_FUNCTIONAL_ASSESSMENT: 0-10
PAIN_FUNCTIONAL_ASSESSMENT: 0-10

## 2024-05-02 ASSESSMENT — COLUMBIA-SUICIDE SEVERITY RATING SCALE - C-SSRS
2. HAVE YOU ACTUALLY HAD ANY THOUGHTS OF KILLING YOURSELF?: NO
6. HAVE YOU EVER DONE ANYTHING, STARTED TO DO ANYTHING, OR PREPARED TO DO ANYTHING TO END YOUR LIFE?: NO
1. IN THE PAST MONTH, HAVE YOU WISHED YOU WERE DEAD OR WISHED YOU COULD GO TO SLEEP AND NOT WAKE UP?: NO

## 2024-05-02 ASSESSMENT — COGNITIVE AND FUNCTIONAL STATUS - GENERAL
MOBILITY SCORE: 24
DAILY ACTIVITIY SCORE: 24

## 2024-05-02 ASSESSMENT — ACTIVITIES OF DAILY LIVING (ADL)
HEARING - LEFT EAR: FUNCTIONAL
ADEQUATE_TO_COMPLETE_ADL: YES
PATIENT'S MEMORY ADEQUATE TO SAFELY COMPLETE DAILY ACTIVITIES?: YES
BATHING: INDEPENDENT
FEEDING YOURSELF: INDEPENDENT
WALKS IN HOME: INDEPENDENT
GROOMING: INDEPENDENT
TOILETING: INDEPENDENT
JUDGMENT_ADEQUATE_SAFELY_COMPLETE_DAILY_ACTIVITIES: YES
ASSISTIVE_DEVICE: DENTURES UPPER;DENTURES LOWER;EYEGLASSES
DRESSING YOURSELF: INDEPENDENT
HEARING - RIGHT EAR: FUNCTIONAL

## 2024-05-02 ASSESSMENT — LIFESTYLE VARIABLES
EVER FELT BAD OR GUILTY ABOUT YOUR DRINKING: NO
HAVE PEOPLE ANNOYED YOU BY CRITICIZING YOUR DRINKING: NO
HOW OFTEN DO YOU HAVE 6 OR MORE DRINKS ON ONE OCCASION: NEVER
AUDIT-C TOTAL SCORE: 0
EVER HAD A DRINK FIRST THING IN THE MORNING TO STEADY YOUR NERVES TO GET RID OF A HANGOVER: NO
AUDIT-C TOTAL SCORE: 0
HOW OFTEN DO YOU HAVE A DRINK CONTAINING ALCOHOL: NEVER
HAVE YOU EVER FELT YOU SHOULD CUT DOWN ON YOUR DRINKING: NO
SKIP TO QUESTIONS 9-10: 1
AUDIT-C TOTAL SCORE: 0
HOW MANY STANDARD DRINKS CONTAINING ALCOHOL DO YOU HAVE ON A TYPICAL DAY: PATIENT DOES NOT DRINK
TOTAL SCORE: 0
SKIP TO QUESTIONS 9-10: 1

## 2024-05-02 ASSESSMENT — PATIENT HEALTH QUESTIONNAIRE - PHQ9
2. FEELING DOWN, DEPRESSED OR HOPELESS: NOT AT ALL
1. LITTLE INTEREST OR PLEASURE IN DOING THINGS: NOT AT ALL
SUM OF ALL RESPONSES TO PHQ9 QUESTIONS 1 & 2: 0

## 2024-05-02 ASSESSMENT — PAIN SCALES - GENERAL: PAINLEVEL_OUTOF10: 0 - NO PAIN

## 2024-05-02 NOTE — ED PROVIDER NOTES
CC: Pacemaker Problem     History provided by: Patient, Family Member, and EMS  Limitations to History: None    HPI:  Patient is a 69-year-old male with history of seizure, pacemaker placement on April 25, 2024 who presents with pacemaker issue.  Patient states he was called by his device monitor company and states that he was told his leads and wires are not firing properly.  Denies any syncopal episodes, dizziness, significant chest pain, shortness of breath, leg swelling at this time.  He denies any blood thinner use.  He denies any significant pain at pacemaker insertion site.  He does state he has had 1 second episodes of sharp left-sided chest pain that are not persistent, no radiating chest pain, no abdominal pain, nausea, vomiting.    Reviewed prior EMR; patient presented with bradycardia last week to ED and  was taken to Cath Lab for temporary pacemaker permanent pacemaker was placed on April 25, 2024 for symptomatic bradycardia and AV block.  Patient was given Keflex for prophylaxis.  Patient's home medications include lamotrigine, Keppra.  Per chart review, patient has a Abbott dual-chamber pacemaker.    External Records Reviewed:  I reviewed prior ED visits, Care Everywhere, discharge summaries and outpatient records as appropriate.   ???????????????????????????????????????????????????????????????  Triage Vitals:  T 36.8 °C (98.2 °F)  HR 74  /75  RR 16  O2 98 % None (Room air)    Physical Exam  Vitals and nursing note reviewed.   Constitutional:       General: He is not in acute distress.     Appearance: Normal appearance.   HENT:      Head: Normocephalic and atraumatic.   Eyes:      Conjunctiva/sclera: Conjunctivae normal.   Cardiovascular:      Rate and Rhythm: Normal rate and regular rhythm.      Pulses: Normal pulses.      Heart sounds: Normal heart sounds.      Comments: Dressing and pacemaker to L upper chest, non tender to palpation, no surrounding erythema  Pulmonary:      Effort:  Pulmonary effort is normal. No respiratory distress.      Breath sounds: Normal breath sounds.   Abdominal:      General: Abdomen is flat. There is no distension.      Palpations: Abdomen is soft.      Tenderness: There is no abdominal tenderness. There is no guarding.   Musculoskeletal:         General: No swelling or deformity. Normal range of motion.      Cervical back: Normal range of motion and neck supple.   Skin:     General: Skin is warm and dry.   Neurological:      General: No focal deficit present.      Mental Status: He is alert and oriented to person, place, and time. Mental status is at baseline.   Psychiatric:         Mood and Affect: Mood normal.         Behavior: Behavior normal.        ???????????????????????????????????????????????????????????????  ED Course/Treatment/Medical Decision Making  MDM:  Patient is a 69-year-old male who presents with pacemaker issue.  Vital signs are stable.  EKG does show rhythm that is nonpaced, first-degree AV block, patient does not appear symptomatic.  I discussed case with electrophysiology obtain labs and chest x-ray.  I did talk to device nurse and interrogation report was uploaded to epic which states high ventricular rate and reversion. Other differentials considered include ACS, post op infection however low suspicion based on presentation. Labs and CXR overall wnl. Discussed with EP and patient admitted to cardiology service.      ED Course:  ED Course as of 05/02/24 1527   Thu May 02, 2024   1333 Discussed with cardiology at bedside, recommend formal EP consult, device interrogation, 2 view x-ray which was ordered [SA]   1355 EKG reviewed sinus rhythm with first-degree AV block rate 65, GA interval 218 ms,  ms, QTc 436 ms, no acute ST segment elevations or depressions, right bundle branch block, does not appear to be paced rhythm [SA]   1444 Senior Resident Attestation  Patient seen and discussed with jesús resident.  I have independently evaluated  the patient and reviewed all necessary laboratory and radiographic results.    69-year-old male who is status post recent pacemaker placement last week due to intermittent sinus pauses and heart block presenting to the ED after being told to come in by pacemaker manufacture.  States that he was told the inferior wire of his pacemaker was malfunctioning.  Patient hemodynamically stable here, appears to be in sinus rhythm in the 60s, nonpaced.  Discussed patient with EP fellow who recommends basic labs, chest x-ray, pacemaker interrogation.  Patient will require admission for further workup and management of this.  Patient to be signed out pending completion of workup, final recommendations from EP.    Patient seen and discussed with ED attending physician.    Vitor Garcia MD  PGY 3 Emergency Medicine [SH]      ED Course User Index  [SA] Neris Doshi DO  [] Jerzy Garcia MD         Diagnoses as of 05/02/24 1527   Pacemaker       EKG Interpretation:  See ED Course/Below:    Independent Interpretation of Studies:  I independently interpreted labs/imaging as stated in ED Course or below.    Differential diagnoses considered include but are not limited to: See MDM/Below:    Social Determinants Limiting Care:  None identified    Discussion of Management with Other Providers: See MDM/Below:    Disposition:  Admitted     TONG Lincoln, PGY-2    I reviewed the case with the attending ED physician. The attending ED physician agrees with the plan. Patient and/or patient´s representative was counseled regarding labs, imaging, likely diagnosis, and plan. All questions were answered.    Disclaimer: This note was dictated by speech recognition.  Attempt at proofreading was made to minimize errors.  Errors in transcription may be present.  Please call if questions.    Procedures ? ShowMe last updated 5/2/2024 1:29 PM        Neris Doshi DO  Resident  05/02/24 7079       I saw and evaluated the patient. I  personally obtained the key and critical portions of the history and physical exam or was physically present for key and critical portions performed by the resident/fellow/JESENIA. I reviewed the resident/fellow/JESENIA's documentation and discussed the patient with the resident/fellow/JESENIA. I agree with the resident/fellow/JESENIA's medical decision making as documented in the note.    ** Please excuse any errors in grammar or translation related to this dictation. Voice recognition software was utilized to prepare this document. **       Cole Crain MD  [facility] Emergency Medicine          Cole rCain MD  05/03/24 2979

## 2024-05-02 NOTE — SIGNIFICANT EVENT
HISTORY OF PRESENT ILLNESS  ====================================  Mr. Deonte Leavitt is a 68 yo M with PMHx of seizures, high-grade AVB and transient CHB s/p Abbott dcPPM 4/26/2024 at [facility] who presented to [facility] ED for evaluation after Walter notified the pt of possible malfunction of RV lead.    Seen by EP in ED who recommended admission with plan for RV lead revision.    Pt states he currently feels at his baseline. Denies any recent chest pain, SOB, light headedness, BLE edema.      ED Course:  VS: T 36.8  /74 HR 76 RR 16 O2 99 on RA    Labs:  CBC WBC 8.9 Hb 13.4 Plt 278  RFP Na 137 K 4.3 Cl 100 HCO3 29 BUN 15 SCr 0.82 Glc 75 Ca 9.3 Mg 1.9   LFTs AST/ALT 18/30 Alk Phos 91 T Bili 0.3 Alb 3.9 TProt 6.6      Imaging:   CXR 5/2/2024  IMPRESSION:  No acute pulmonary pathology.    Device interrogation 5/2/24  Allert 11 noise reversions and 2 VHR episodes; all recorded d/t noise with inhibition since implant 4/25/24. Presenting noise noted on v-sensed IEGMs. Pt reports occasional dizzy spells lasting only a second or two, advised if continue or worsen to go to ER. Pt verbalized understanding. Sensing and lead impedances continue to trend at patients' baseline and remain unchanged per trending graphs. Batter status is normal.     EKG: sinus with 1st degree AV block (), RBBB morphology (few prior ECG, only other in our system shows RV pacing so unclear if this is his intrinsic ventricular pacing)      REVIEW OF SYSTEMS:   ====================================  12 Point ROS otherwise negative    PHYSICAL EXAM:  ====================================  General: well-developed, well-nourished, no acute distress, AAOx3  HEENT: EOM intact, PERRL, no JVD  CV: regular rate and rhythm, normal S1/S2, no murmur, gallop, or rub  Pulm: normal respiratory effort, clear to auscultation bilaterally with no wheezes, rales, rhonchi  Abd: soft, nontender, nondistended, no masses, normoactive bowel sounds  Extremities: warm, no LE  edema  Neuro: moves all extremities equally, CN II - XII grossly intact  Psych: normal mood and affect  Skin: warm, dry, intact      ASSESSMENT & PLAN  ====================================  Mr. Deonte Leavitt is a 68 yo M with PMHx of seizures, high-grade AVB and transient CHB s/p Abbott dcPPM 4/26/2024 at [facility] who presented to [facility] ED for evaluation after Walter notified the pt of possible malfunction of RV lead. Plan for admission and evaluation by EP for possible RV lead revision.    #Hx of high-grade AVB  - CHB s/p [employer] dcPPM 4/26/2024  - EP consulted, appreciate recs  - NPO at midnight for possible RV lead revision  - Continue to monitor on telemetry  - Continue Keflex 500mg BID (original planned end-date 5/3/2024    #Seizures  - Continue home AEDs  - Lamotrigine 50mg BID  - Levetiracetam 500mg BID    F: bolus PRN  E: replete PRN  N: regular; npo at midnight for possible procedure  A: PIV    DVT PPx: SCDs, holding periprocedurally  GI PPx: N/A    CODE STATUS: FULL CODE (confirmed on admission)  SURROGATE DECISION MAKER: Katemax Leavitt

## 2024-05-02 NOTE — ED TRIAGE NOTES
Patient states that he was told to come to the ED as his monitor company for his pacer stated that one of his leads/wires was not firing appropriately.  Pt had a pacemaker placed 4/25/24 for pulsless events causing anoxic seizure activities.

## 2024-05-02 NOTE — H&P
"MEDICINE ADMISSION NOTE    History of Present Illness  Deonte Leavitt is a 69 y.o. male with PMHx of epilepsy (currently on Keppra 500mg BID and lamotrigine 50mg BID), who developed symptomatic high-grade AVB and transient CHB (NO available ECG) requiring TVP placement in [facility] so that EP underwent successful Abbott dcPPM implantation on 4/26/2024. Otherwise, his inpatient course was uncomplicated and patient was discharged home next day. Since that, patient did not develop any recurrent cardiopulmonary symptoms but Walter notified patient that his RV lead has significant noise with possible malfunctions. Therefore, patient presented to [facility] ED for the further evaluations and EP was consulted.     In [facility] [place], patient was hemodynamically stable. 12-lead ECG demonstrated intrinsic bifascicular AVB with prolonged WV interval. CXR (AP and lateral) did not show macro-dislodgements of RA and RV leads. The remote CIED interrogation showed significant noise events on 5/1/2024 2-11 PM and possible missing capture on RV lead. The presenting rhythm on 5/2/2024 also demonstrated RV noise. Otherwise, the remote CIED interrogation reported generally normal functions (RA: 490 Ohm, sense 3.2 mV; RV: 650 Ohm, sense 4.1 mV; the capture thresholds were not tested).       <Cardiovascular Data>  12-lead ECG 5/2/2024: NSR 65 bpm,  ms, bifascicular AVB (CRBBB and LAFB), NO LVH or ST-T changes  TTE 4/2024: LVEF 55-60%, NO significant valvular heart diseases or HOCM physiology.    Patient denies any fever, chills, lightheadedness, shortness of breath, chest pain, abdominal pain, N/V/C/D, lower extremity pain/swelli      On interview:  Hx was taken from patient and wife.  Patient reported feeling better after the pacemaker placement compliant to antibiotics no new complaint.    Of note patient reported following up with epileptologist at  Dr. Silvestre he was started on Keppra in 2022 however patient developed side effect \"depression\" " "he was started to lamotrigine with a plan to up titrate to 200mg BID with a plan to taper off Keppra. Lamotrigine was uptitrated to 75 mg twice daily however patient developed seizure and he attributed that to lamotrigine and he went back to 50 mg twice daily. Also reported breaking Keppra tablets to 250 mg every 6 as he feels the medication wears off 1 hour before the next dose. No clear Aura    Seizure semiology:  Seizure 1 dialpesis rapid breathing and snoring lasting around 2 minutes no clear aura or postictal state. Patient has a video of the spell.  Seizure 2 generalized tonic seizure eyes were open associated with urinary and/or fecal incontinence.  Denied tongue biting.  Frequency 5 seizures not sure which semiology patient has significant diary.  Review of Systems    ED Course:  BP: 146/75  Temperature: 36.8 °C (98.2 °F)  Heart Rate: 74  Respirations: 16  Pulse Ox: 98 %  Current Vitals  /74   Pulse 76   Temp 36.8 °C (98.2 °F) (Temporal)   Resp 16   Ht 1.727 m (5' 8\")   Wt 70 kg (154 lb 5.2 oz)   SpO2 99%   BMI 23.46 kg/m²      Labs:     CBC: WBC 8.9 , HGB 13.4,   BMP: , K 4.3, Cl 100, HCO3 29, BUN 15, CR 0.82, Glu 75  LFTS: AST 18 , ALT 30, ALKPHOS 91 , TBILI 0.3 , DBILI No results found for requested labs within last 365 days.  TROP: 32  BNP: No results found for requested labs within last 365 days.  COAGS: PT No results found for requested labs within last 365 days. , PTT No results found for requested labs within last 365 days.  , INR No results found for requested labs within last 365 days.  UA:     ABG:    CALCIUM 9.3 MAG No results found for requested labs within last 365 days. ALB 3.9 LACTATE No results found for requested labs within last 365 days. PHOS No results found for requested labs within last 365 days. COVIDNo results found for requested labs within last 365 days.     EKG  Encounter Date: 04/24/24   Electrocardiogram, 12-lead PRN ACS symptoms   Result Value    " Ventricular Rate 79    Atrial Rate 78    QRS Duration 202    QT Interval 484    QTC Calculation(Bazett) 554    P Axis -18    R Axis 126    T Axis -45    QRS Count 13    Q Onset 183    T Offset 425    QTC Fredericia 530    Narrative    Ventricular-paced rhythm  Abnormal ECG  No previous ECGs available      Imaging:  XR chest 2 views    Result Date: 5/2/2024  STUDY: Chest Radiographs;  5/2/2024 1:57 PM. INDICATION: Pacemaker malfunction. COMPARISON: Chest radiograph 4/26/2024. ACCESSION NUMBER(S): [id number] ORDERING CLINICIAN: ALESSIA HO TECHNIQUE:  Frontal and lateral chest. FINDINGS: CARDIOMEDIASTINAL SILHOUETTE: Cardiomediastinal silhouette is normal in size and configuration.  LUNGS: Lungs are clear.  ABDOMEN: No remarkable upper abdominal findings.  BONES: No acute osseous changes.    No acute pulmonary pathology. Signed by Bull Estrada MD      ED Interventions:  Medications - No data to display    Cardiac Hx:  Last echo:  Transthoracic echo 04/24/2024  CONCLUSIONS:   1. Left ventricular systolic function is normal with a 55-60% estimated ejection fraction.   2. Abnormal septal motion consistent with RV pacemaker.   3. Aortic valve sclerosis.  Last cath:   Cardiac Catheterization Procedure  Result Date: 4/24/2024   [facility], Cath Lab, [address] [address] Cardiovascular Catheterization Report Patient Name:      AYDEE DEE MAIRA         Performing Physician:  [id number] León Park MD Study Date:        4/24/2024            Verifying Physician:   [id number] Park MD MRN/PID:           [id number]             Cardiologist/Co-Scrub: Accession#:        [id number]         Ordering Provider:     [id number] JUAN DUDLEY Date of Birth/Age: 1955 / 69 years Cardiologist: Gender:             CUONG                    Fellow: Encounter#:        [id number]           Surgeon:  Study: Temporary Pacemaker Insertion  Indications: AYDEE RAO is a 69 year old male who presents with Complete heart block. Complete heart block.  Procedure Description: After infiltration with 2% Lidocaine, the was cannulated with a modified Seldinger technique. After infiltration of local anesthetic, the right external jugular vein was identified with two-dimensional ultrasound. Under direct ultrasound visualization, the right external jugular vein was cannulated with a micropuncture technique. A 8 [place] sheath was placed in the vein. A Temporary Pacemaker was inserted during the procedure. Thresholds were established and was then turned down below the baseline heart rate. Post-procedure, the venous sheath was left intact and sutured in place.  Right Heart Catheterization: A Temporary Pacemaker was inserted during the procedure. Thresholds were established and was then turned down below the baseline heart rate.  Hemo Personnel: +-------------------+---------+ Name               Duty      +-------------------+---------+ León Park MDPRUFUS MD 1 +-------------------+---------+  +----------+ Contrast:  +----------+ Omnipaque: +----------+  Oxygen Saturation %: +-----------+------------+ Sample SiteHB (g/100ml) +-----------+------------+     SYS ART        16.5 +-----------+------------+     SYS ROLY        16.5 +-----------+------------+     PUL ART        16.5 +-----------+------------+     PUL ROLY        16.5 +-----------+------------+  Cardiac Cath Post Procedure Notes: Post Procedure Diagnosis: Complete heart block. Blood Loss:               Estimated blood loss during the procedure was 5cc mls. Specimens Removed:        Number of specimen(s) removed: none.  Recommendations: EPS evaluation for PPM. ____________________________________________________________________________________ CONCLUSIONS:  1.  Successful placement of tempoary pacemaker via RIJ access for complete heart block. ICD 10 Codes: Atrioventricular block, complete-I44.2  CPT Codes: Insertion of temporary single chamber cardiac electrode or pacemaker catheter (separate procedure)-80810; Moderate Sedation Services initial 15 minutes patient >5 years-99717; Moderate Sedation Services 1st additional 15 minutes patient >5 years-62435  [id number] León Park MD Performing Physician Electronically signed by [id number] León Park MD on 4/24/2024 at 2:39:00 PM  ** Final **     Cardiomegaly but no evidence of acute intrathoracic abnormality.   Signed by: Alireza Olivares 4/24/2024 10:40 AM Dictation workstation:   [id number]     Last EKG:   Encounter Date: 04/24/24   Electrocardiogram, 12-lead PRN ACS symptoms   Result Value    Ventricular Rate 79    Atrial Rate 78    QRS Duration 202    QT Interval 484    QTC Calculation(Bazett) 554    P Axis -18    R Axis 126    T Axis -45    QRS Count 13    Q Onset 183    T Offset 425    QTC Fredericia 530    Narrative    Ventricular-paced rhythm  Abnormal ECG  No previous ECGs available        GI Hx:  Last EGD: No results found for this or any previous visit. No results found for this or any previous visit.  Last Colonoscopy: No results found for this or any previous visit. No results found for this or any previous visit.     Past Medical History  Past Medical History:   Diagnosis Date    Seizures (Multi)        Surgical History  Past Surgical History:   Procedure Laterality Date    CARDIAC ELECTROPHYSIOLOGY PROCEDURE N/A 4/24/2024    Procedure: Temporary Pacemaker Insertion;  Surgeon: León Park MD;  Location: [facility] Cardiac Cath Lab;  Service: Cardiovascular;  Laterality: N/A;    CARDIAC ELECTROPHYSIOLOGY PROCEDURE N/A 4/25/2024    Procedure: PPM IMPLANT DUAL;  Surgeon: Sandoval Jennings MD;  Location: [facility] Cardiac Cath Lab;  Service: Electrophysiology;  Laterality: N/A;     Social History  He reports that he has  "been smoking cigarettes. He has never used smokeless tobacco. He reports that he does not currently use alcohol.  Drug: Marijuana.    Family History  No family history on file.     Allergies  Patient has no known allergies.     Physical Exam  Blood pressure 152/68, pulse 67, temperature 36.8 °C (98.2 °F), temperature source Temporal, resp. rate (!) 29, height 1.727 m (5' 8\"), weight 70 kg (154 lb 5.2 oz), SpO2 94%.  Constitutional:       General: Alert conscious oriented x 3.  He is not in acute distress.     Appearance: Normal appearance.   HENT:      Head: Normocephalic and atraumatic.   Eyes:      Conjunctiva/sclera: Conjunctivae normal.   Cardiovascular:      Rate and Rhythm: Normal rate and regular rhythm.      Pulses: Normal pulses.      Heart sounds: Normal heart sounds.      Comments: Dressing and pacemaker to L upper chest, non tender to palpation, no surrounding erythema  Pulmonary:      Effort: Pulmonary effort is normal. No respiratory distress.      Breath sounds: Normal breath sounds.   Abdominal:      General: Abdomen is flat. There is no distension.      Palpations: Abdomen is soft.      Tenderness: There is no abdominal tenderness. There is no guarding.   Musculoskeletal:         General: No swelling or deformity. Normal range of motion.      Cervical back: Normal range of motion and neck supple.   Skin:     General: Skin is warm and dry.   Neurological:      General: No focal deficit present.      Mental Status: He is alert and oriented to person, place, and time. Mental status is at baseline.   Psychiatric:         Mood and Affect: Mood normal.         Behavior: Behavior normal.      Medications  Home Meds  Prior to Admission medications    Medication Sig Start Date End Date Taking? Authorizing Provider   cephalexin (Keflex) 500 mg capsule Take 1 capsule (500 mg) by mouth 4 times a day for 7 days. 4/26/24 5/3/24  Adam Aguirre,    lamoTRIgine (LaMICtal) 25 mg tablet Take 3 tablets (75 mg) by " mouth 2 times a day. Per pt, cut his dose down without speaking with provider    Historical Provider, MD   levETIRAcetam (Keppra) 500 mg tablet Take 1 tablet (500 mg) by mouth 2 times a day.    Historical Provider, MD     Scheduled medications    Continuous medications    PRN medications    Relevant results  Result Date: 4/25/2024  Table formatting from the original result was not included. Description of the Procedure: Dual chamber pacemaker implantation Procedures: Implant of dual chamber PPM ([id number]) Patient history: Please refer to the detailed history and physical on the patient's medical chart. Procedure narrative: The patient was in the fasting state with a temporary pacing wire from his right IJ. A grounding pad was placed. The patient was set up for continuous monitoring of surface 12 lead ECG and pulse oximetry. Blood pressure was monitored. The procedure was performed under moderate sedation. The left upper chest was prepped and draped in the usual sterile fashion. Local anesthesia: After preoperative IV antibiotic was completely infused, subcutaneous tissues just medial to the left deltopectoral area, were infiltrated with bupivacaine for local anesthesia. Using a scalpel, an incision was made, which was extended to the left prepectoral fascia using blunt dissection. A pulse generator pocket was created. The Left cephalic vein was identified, the distal end was tied off, and using Nuñez scissors, a venotomy was created. A wire (0.035 Glidewire) was advanced through the venotomy. A 6 F sheath was placed over of the guidewires. The RV pacing lead was then advanced to the heart via fluoroscopic guidance. The ventricle was mapped, and the lead was fixed to the right ventricular apex. After lead placement, appropriate sensing and thresholds were obtained. The sheath was peeled away. A second 6 F sheath was advanced over the guidewire, and the dilator and guidewire were removed. Under fluoroscopic guidance, a  pacing lead was advanced to the heart, and the atrium was mapped. The lead was fixed to the right atrial appendage. The sheath was peeled away. The leads were sutured in place to the pectoralis muscle using 0 Tycron suture. Lead measurements were obtained. A dual chamber pacemaker pulse generator was attached to the leads and implanted using a Tyrx pouch. The device was interrogated and its parameters recorded; telemetered electrograms and pacing and sensing thresholds were measured. The temporary pacing wire was removed. The pocket was flushed with Vancomycin solution. The wound was closed in three layers using. a 0 Vicryl interrupted layer, followed by a 3.0 Vicryl  continuous layer, and a 4.0 Vicryl continuous layer for the subcuticular layer. Steri-Strips and a dressing were applied. Final Implant Settings: Lead Parameters Atrial: 650 ohms, P wave 3.1 mV, threshold 1.5 V@0.4msec RVent: 1025 ohms, R wave 5.9 mV, threshold 1 V@0.4msec, Summary: Successful implantation of a dual chamber pacemaker (Abbott). Patient Instructions: Please do not lift left arm above shoulder level for 4-5 weeks No repetitive motion or lifting heavy weight for 4-5 weeks No driving, alcohol or making legal decisions for 24 hours. Keep wound completely dry for 7 days; may shower but keep bandage as dry as possible. No soaking in hot tubs or baths for 10 days. May sponge bath Keep bandage on incision for 1 week. Allow steristrips underneath to fall off naturally.  Please call our office if you notice any discharge or swelling around incision or fever. Follow up: The patient should be alert for bleeding, swelling, or signs of infection. The patient should call the electrophysiologist immediately if symptoms recur, or for any problems. The patient and family (with HIPPA consent)  have been instructed accordingly. Follow up in Clinic for wound check as scheduled. Device clinic follow up will then be scheduled. Remote monitoring will be  instituted if possible. See complete procedural log and parameters. Complications: None Stents/Implants: Implants   Pacemaker  Lead, Tendril, Sts Domestic, 58cm - [id number] - [id number] - Implanted    Inventory item: LEAD, TENDRIL, STS DOMESTIC, 58CM Model/Cat number: 2088TC/58  Serial number: [id number] : [employer]  Lot number: [id number] Device identifier: [id number]  Implant Date: 4/25/2024    As of 4/25/2024   Status: Implanted        Lead, Tendril, Sts Domestic, 52cm - [id number] - [id number] - Implanted    Inventory item: LEAD, TENDRIL, STS DOMESTIC, 52CM Model/Cat number: 2088TC/52  Serial number: [id number] : [employer]  Lot number: [id number] Device identifier: [id number]  Implant Date: 4/25/2024    As of 4/25/2024   Status: Implanted        Pacemaker, Generator, Dual Assurity Mri - [id number] - [id number] - Implanted    Inventory item: PACEMAKER, GENERATOR, DUAL ASSURITY MRI Model/Cat number: [id number]  Serial number: [id number] : [employer]  Lot number: [id number] Device identifier: [id number]  Implant Date: 4/25/2024    As of 4/25/2024   Status: Implanted        Estimated Blood Loss: 10 mL Anesthesia: Moderate Sedation Anesthesia Staff: No anesthesia staff entered. Any Specimen(s) Removed: No specimens collected during this procedure.    Assessment/Plan   69 y.o. male with PMHx of epilepsy (currently on Keppra 500mg BID and lamotrigine 50mg BID) with medication noncompliance, who developed symptomatic high-grade AVB and transient CHB (NO available ECG) requiring TVP placement in [facility] so that EP underwent successful Abbott dcPPM implantation on 4/26/2024. Since that, patient did not develop any recurrent cardiopulmonary symptoms but Walter notified patient that his RV lead has significant noise with possible malfunctions. Therefore, patient presented to [facility] ED for the further evaluations.  Regarding patient epilepsy will consider outpatient EEG and  follow up with epileptologist to further classify seizure.     NEUROLOGY/ PSYCH:  #Epilepsy (uncontrolled)  (currently on Keppra 500mg BID and lamotrigine 50mg BID)  -Questionable medication compliance.  -Obtain OSH recored  -Consider EEG/ neuroimaging  -Obtain keppra level  -Seizure 1 dialpesis rapid breathing and snoring lasting around 2 minutes no clear aura or postictal state. Patient has a video of the spell.  -Seizure 2 generalized tonic seizure eyes were open sometimes associated with urinary and/or fecal incontinence.  Denied tongue biting.  -Frequency 5 seizures not sure which semiology patient has a seizure diary.    CARDIOVASCULAR:  #High-grade AVB  - CHB s/p dcPPM implantation on 4/26/2024.  No cardiopulmonary symptoms but Walter notified patient that his RV lead has significant noise with possible malfunctions.  -Consult cardiac electrophysiology, appreciate recs  - NPO at midnight for possible RV lead revision  - Continue to monitor on telemetry  - Continue Keflex 500mg BID (original planned end-date 5/3/2024    Pain medications: PRN Tylenol  Fluids: Replete PRN  Electrolytes: Keep mg >2, phos >3  and K >4  Nutrition:  NPO Diet; Effective midnight  Adult diet Regular, 2-3 grams sodium, Cardiac; 70 gm fat; 2 - 3 grams Sodium   Antimicrobials: None  DVT PPX: SCD's  GI ppx: none needed  Bowel care: Miralax  Catheter: None  Lines: PIV  Oxygen: Room Air    Disposition:   Home    Code Status: Full Code (confirmed on admission)   NOK:  Primary Emergency Contact: Kate Leavitt, Home Phone: [phone]       Maurice Pryor MD

## 2024-05-02 NOTE — PROGRESS NOTES
Pharmacy Medication History Review    Deonte Leavitt is a 69 y.o. male admitted for Pacemaker. Pharmacy reviewed the patient's dcbyw-xm-uqwzgondm medications and allergies for accuracy.    Medications ADDED:  Lamotrigine 100 mg  Medications CHANGED:  lamotrigine  Medications REMOVED:   Lamotrigine 25 mg     The list below reflects the updated PTA list. Comments regarding how patient may be taking medications differently can be found in the Admit Orders Activity  Prior to Admission Medications   Prescriptions Last Dose Informant   cephalexin (Keflex) 500 mg capsule 5/1/2024 Self   Sig: Take 1 capsule (500 mg) by mouth 4 times a day for 7 days.   lamoTRIgine (LaMICtal) 100 mg tablet Not taking yet Self   Sig: Take 1 tablet (100 mg) by mouth 2 times a day.   lamoTRIgine (LaMICtal) 25 mg tablet  Self   Sig: Take 3 tablets (75 mg) by mouth 2 times a day. Per pt, cut his dose down without speaking with provider  --> taking 50 mg BID   levETIRAcetam (Keppra) 500 mg tablet 5/1/2024 Self   Sig: Take 1 tablet (500 mg) by mouth 2 times a day.      Facility-Administered Medications: None        The list below reflects the updated allergy list. Please review each documented allergy for additional clarification and justification.  Allergies  Reviewed by Jayna Rose on 5/2/2024   No Known Allergies         Patient declines M2B at discharge. Pharmacy has been updated to N/A.    Sources used to complete the med history include out patient fill history, OARRS, and patient interview. Patient was a great medical historian.    Below are additional concerns with the patient's PTA list.  N/A    Jayna Rose [facility]  Transitions of Care Technician  [facility]  Please reach out via Secure Chat for questions, or if no response call [employer] or [employer]

## 2024-05-02 NOTE — CONSULTS
"Inpatient consult to Electrophysiology  Consult performed by: Hansa Pickens MD  Consult ordered by: Cole Crain MD  Reason for consult: RV lead noises      History Of Present Illness:    Deonte Leavitt is a 69 y.o. male with PMH for seizures (currently on Keppra), who developed post-seizure symptomatic high-grade AVB and transient CHB (NO available ECG) requiring TVP placement in [facility] so that EP underwent successful Abbott dcPPM implantation on 4/26/2024. Otherwise, his inpatient course was uncomplicated and patient was discharged home next day. Since that, patient did not develop any recurrent cardiopulmonary symptoms but Walter notified patient that his RV lead has significant noise with possible malfunctions. Therefore, patient presented to [facility] ED for the further evaluations and EP was consulted.    In [facility] ED, patient was hemodynamically stable. 12-lead ECG demonstrated intrinsic bifascicular AVB with prolonged SC interval. CXR (AP and lateral) did not show macro-dislodgements of RA and RV leads. The remote CIED interrogation showed significant noise events on 5/1/2024 2-11 PM and possible missing capture on RV lead. The presenting rhythm on 5/2/2024 also demonstrated RV noise. Otherwise, the remote CIED interrogation reported generally normal functions (RA: 490 Ohm, sense 3.2 mV; RV: 650 Ohm, sense 4.1 mV; the capture thresholds were not tested).    <Cardiovascular Data>  12-lead ECG 5/2/2024: NSR 65 bpm,  ms, bifascicular AVB (CRBBB and LAFB), NO LVH or ST-T changes  TTE 4/2024: LVEF 55-60%, NO significant valvular heart diseases or HOCM physiology.     Last Recorded Vitals:  Vitals:    05/02/24 1310 05/02/24 1328   BP: 146/75 148/74   BP Location: Left arm    Patient Position: Sitting    Pulse: 74 76   Resp: 16 16   Temp: 36.8 °C (98.2 °F)    TempSrc: Temporal    SpO2: 98% 99%   Weight:  70 kg (154 lb 5.2 oz)   Height:  1.727 m (5' 8\")       Last Labs:  CBC - 5/2/2024:  1:49 PM  8.9 " "13.4 278    40.3      CMP - 4/26/2024:  5:11 AM  8.6 7.3 65 --- 0.5   2.2 3.6 143 116      PTT - No results in last year.  _   _ _     Troponin I, High Sensitivity   Date/Time Value Ref Range Status   04/24/2024 12:51 PM 32 (H) 0 - 20 ng/L Final   04/24/2024 09:53 AM 29 (H) 0 - 20 ng/L Final     LDL Calculated   Date/Time Value Ref Range Status   05/31/2022 12:47 PM 71 65 - 130 MG/DL Final   02/11/2022 03:43  (H) 65 - 130 MG/DL Final      Last I/O:  No intake/output data recorded.    Past Cardiology Tests (Last 3 Years):  EKG:  Electrocardiogram, 12-lead PRN ACS symptoms 04/24/2024 (Preliminary)    Echo:  Transthoracic Echo (TTE) Complete 04/24/2024    Ejection Fractions:  No results found for: \"EF\"  Cath:  Cardiac Catheterization Procedure 04/24/2024    Stress Test:  No results found for this or any previous visit from the past 1095 days.    Cardiac Imaging:  No results found for this or any previous visit from the past 1095 days.      Past Medical History:  He has a past medical history of Seizures (Multi).    Past Surgical History:  He has a past surgical history that includes Cardiac electrophysiology procedure (N/A, 4/24/2024) and Cardiac electrophysiology procedure (N/A, 4/25/2024).      Social History:  He reports that he has been smoking cigarettes. He has never used smokeless tobacco. He reports that he does not currently use alcohol.  Drug: Marijuana.    Family History:  No family history on file.     Allergies:  Patient has no known allergies.    Inpatient Medications:        Outpatient Medications:  Current Outpatient Medications   Medication Instructions    cephalexin (KEFLEX) 500 mg, oral, 4 times daily    lamoTRIgine (LAMICTAL) 75 mg, oral, 2 times daily, Per pt, cut his dose down without speaking with provider    levETIRAcetam (KEPPRA) 500 mg, oral, 2 times daily       Physical Exam:  GA: not in acute distress, AAO x3  CV: NO JVD, normal S1/S2, no significant heart murmurs, CTAB  Ext: NO " pretibial pitting edema bilaterally  Neuro: grossly intact     Assessment/Plan   Deonte Leavitt is a 69 y.o. male with PMH for seizures (currently on Keppra), who developed post-seizure symptomatic high-grade AVB and transient CHB (NO available ECG) requiring TVP placement in [facility] so that EP underwent successful Abbott dcPPM implantation on 4/26/2024. After he was discharged home, patient was doing good but Walter's remote interrogation found his RV lead having significant noise with possible malfunctions. Therefore, patient presented to [facility] ED for the further evaluations and EP was consulted.    #RV lead malfunction  Possible RV lead fractures causing significant noises, no change in lead parameters noted on recent interrogation.   - NO macro-dislodgement on CXR on this admission  - Please admit this case to inpatient cardiology team (either [facility] or HVI team).  - EP will perform RV lead revision tomorrow; Please place NPO MN and hold any anticoagulants prior to this procedure tonight.  - OK to continue rest of his home medications this time    Thank you for this consultation and EP will follow this case later.    Hansa Pickens MD  Clinical Cardiac Electrophysiology Fellow    Code Status:  Full Code    I spent 60 minutes in the professional and overall care of this patient.    Hansa Pickens MD

## 2024-05-03 ENCOUNTER — APPOINTMENT (OUTPATIENT)
Dept: RADIOLOGY | Facility: HOSPITAL | Age: 69
End: 2024-05-03

## 2024-05-03 ENCOUNTER — APPOINTMENT (OUTPATIENT)
Dept: CARDIOLOGY | Facility: HOSPITAL | Age: 69
End: 2024-05-03

## 2024-05-03 PROBLEM — R56.9 SEIZURES (MULTI): Status: ACTIVE | Noted: 2024-05-03

## 2024-05-03 PROBLEM — L01.00 IMPETIGO: Status: ACTIVE | Noted: 2024-05-03

## 2024-05-03 LAB
ALBUMIN SERPL BCP-MCNC: 4 G/DL (ref 3.4–5)
ALP SERPL-CCNC: 97 U/L (ref 33–136)
ALT SERPL W P-5'-P-CCNC: 33 U/L (ref 10–52)
ANION GAP SERPL CALC-SCNC: 13 MMOL/L (ref 10–20)
AST SERPL W P-5'-P-CCNC: 20 U/L (ref 9–39)
ATRIAL RATE: 65 BPM
BILIRUB SERPL-MCNC: 0.5 MG/DL (ref 0–1.2)
BUN SERPL-MCNC: 14 MG/DL (ref 6–23)
CALCIUM SERPL-MCNC: 9.5 MG/DL (ref 8.6–10.6)
CHLORIDE SERPL-SCNC: 98 MMOL/L (ref 98–107)
CO2 SERPL-SCNC: 31 MMOL/L (ref 21–32)
CREAT SERPL-MCNC: 1 MG/DL (ref 0.5–1.3)
EGFRCR SERPLBLD CKD-EPI 2021: 82 ML/MIN/1.73M*2
ERYTHROCYTE [DISTWIDTH] IN BLOOD BY AUTOMATED COUNT: 12.6 % (ref 11.5–14.5)
GLUCOSE BLD MANUAL STRIP-MCNC: 86 MG/DL (ref 74–99)
GLUCOSE SERPL-MCNC: 88 MG/DL (ref 74–99)
HCT VFR BLD AUTO: 42.3 % (ref 41–52)
HGB BLD-MCNC: 13.9 G/DL (ref 13.5–17.5)
LEVETIRACETAM SERPL-MCNC: 7 UG/ML (ref 10–40)
MCH RBC QN AUTO: 30.3 PG (ref 26–34)
MCHC RBC AUTO-ENTMCNC: 32.9 G/DL (ref 32–36)
MCV RBC AUTO: 92 FL (ref 80–100)
NRBC BLD-RTO: 0 /100 WBCS (ref 0–0)
P AXIS: 72 DEGREES
P OFFSET: 176 MS
P ONSET: 119 MS
PLATELET # BLD AUTO: 271 X10*3/UL (ref 150–450)
POTASSIUM SERPL-SCNC: 4.4 MMOL/L (ref 3.5–5.3)
PR INTERVAL: 218 MS
PROT SERPL-MCNC: 6.7 G/DL (ref 6.4–8.2)
Q ONSET: 228 MS
QRS COUNT: 10 BEATS
QRS DURATION: 176 MS
QT INTERVAL: 420 MS
QTC CALCULATION(BAZETT): 436 MS
QTC FREDERICIA: 431 MS
R AXIS: -89 DEGREES
RBC # BLD AUTO: 4.59 X10*6/UL (ref 4.5–5.9)
SODIUM SERPL-SCNC: 138 MMOL/L (ref 136–145)
T AXIS: 55 DEGREES
T OFFSET: 438 MS
VENTRICULAR RATE: 65 BPM
WBC # BLD AUTO: 8 X10*3/UL (ref 4.4–11.3)

## 2024-05-03 PROCEDURE — C1781 MESH (IMPLANTABLE): HCPCS | Performed by: STUDENT IN AN ORGANIZED HEALTH CARE EDUCATION/TRAINING PROGRAM

## 2024-05-03 PROCEDURE — 99152 MOD SED SAME PHYS/QHP 5/>YRS: CPT | Performed by: STUDENT IN AN ORGANIZED HEALTH CARE EDUCATION/TRAINING PROGRAM

## 2024-05-03 PROCEDURE — G0378 HOSPITAL OBSERVATION PER HR: HCPCS

## 2024-05-03 PROCEDURE — 99153 MOD SED SAME PHYS/QHP EA: CPT | Performed by: STUDENT IN AN ORGANIZED HEALTH CARE EDUCATION/TRAINING PROGRAM

## 2024-05-03 PROCEDURE — 85027 COMPLETE CBC AUTOMATED: CPT

## 2024-05-03 PROCEDURE — 80053 COMPREHEN METABOLIC PANEL: CPT

## 2024-05-03 PROCEDURE — 2500000004 HC RX 250 GENERAL PHARMACY W/ HCPCS (ALT 636 FOR OP/ED): Performed by: STUDENT IN AN ORGANIZED HEALTH CARE EDUCATION/TRAINING PROGRAM

## 2024-05-03 PROCEDURE — 33222 RELOCATION POCKET PACEMAKER: CPT | Performed by: STUDENT IN AN ORGANIZED HEALTH CARE EDUCATION/TRAINING PROGRAM

## 2024-05-03 PROCEDURE — 71045 X-RAY EXAM CHEST 1 VIEW: CPT

## 2024-05-03 PROCEDURE — 80177 DRUG SCRN QUAN LEVETIRACETAM: CPT

## 2024-05-03 PROCEDURE — C1898 LEAD, PMKR, OTHER THAN TRANS: HCPCS | Performed by: STUDENT IN AN ORGANIZED HEALTH CARE EDUCATION/TRAINING PROGRAM

## 2024-05-03 PROCEDURE — 33222 RELOCATION POCKET PACEMAKER: CPT | Mod: 59 | Performed by: STUDENT IN AN ORGANIZED HEALTH CARE EDUCATION/TRAINING PROGRAM

## 2024-05-03 PROCEDURE — C1894 INTRO/SHEATH, NON-LASER: HCPCS | Performed by: STUDENT IN AN ORGANIZED HEALTH CARE EDUCATION/TRAINING PROGRAM

## 2024-05-03 PROCEDURE — 33234 REMOVAL OF PACEMAKER SYSTEM: CPT | Performed by: STUDENT IN AN ORGANIZED HEALTH CARE EDUCATION/TRAINING PROGRAM

## 2024-05-03 PROCEDURE — 2780000003 HC OR 278 NO HCPCS: Performed by: STUDENT IN AN ORGANIZED HEALTH CARE EDUCATION/TRAINING PROGRAM

## 2024-05-03 PROCEDURE — 33216 INSERT 1 ELECTRODE PM-DEFIB: CPT | Performed by: STUDENT IN AN ORGANIZED HEALTH CARE EDUCATION/TRAINING PROGRAM

## 2024-05-03 PROCEDURE — 36415 COLL VENOUS BLD VENIPUNCTURE: CPT

## 2024-05-03 PROCEDURE — 2500000005 HC RX 250 GENERAL PHARMACY W/O HCPCS: Performed by: STUDENT IN AN ORGANIZED HEALTH CARE EDUCATION/TRAINING PROGRAM

## 2024-05-03 PROCEDURE — 2720000007 HC OR 272 NO HCPCS: Performed by: STUDENT IN AN ORGANIZED HEALTH CARE EDUCATION/TRAINING PROGRAM

## 2024-05-03 PROCEDURE — 71045 X-RAY EXAM CHEST 1 VIEW: CPT | Performed by: RADIOLOGY

## 2024-05-03 PROCEDURE — 2500000001 HC RX 250 WO HCPCS SELF ADMINISTERED DRUGS (ALT 637 FOR MEDICARE OP)

## 2024-05-03 PROCEDURE — C1892 INTRO/SHEATH,FIXED,PEEL-AWAY: HCPCS | Performed by: STUDENT IN AN ORGANIZED HEALTH CARE EDUCATION/TRAINING PROGRAM

## 2024-05-03 PROCEDURE — 82947 ASSAY GLUCOSE BLOOD QUANT: CPT

## 2024-05-03 DEVICE — PACING LEAD
Type: IMPLANTABLE DEVICE | Site: HEART | Status: FUNCTIONAL
Brand: TENDRIL™

## 2024-05-03 RX ORDER — FENTANYL CITRATE 50 UG/ML
INJECTION, SOLUTION INTRAMUSCULAR; INTRAVENOUS AS NEEDED
Status: DISCONTINUED | OUTPATIENT
Start: 2024-05-03 | End: 2024-05-03 | Stop reason: HOSPADM

## 2024-05-03 RX ORDER — IBUPROFEN 200 MG
1 TABLET ORAL
COMMUNITY
Start: 2024-04-24 | End: 2024-05-04 | Stop reason: HOSPADM

## 2024-05-03 RX ORDER — LAMOTRIGINE 25 MG/1
50 TABLET ORAL 2 TIMES DAILY
Qty: 120 TABLET | Refills: 0 | Status: SHIPPED | OUTPATIENT
Start: 2024-05-03 | End: 2024-06-03

## 2024-05-03 RX ORDER — SODIUM CHLORIDE 9 MG/ML
INJECTION, SOLUTION INTRAVENOUS CONTINUOUS PRN
Status: COMPLETED | OUTPATIENT
Start: 2024-05-03 | End: 2024-05-03

## 2024-05-03 RX ORDER — ENOXAPARIN SODIUM 100 MG/ML
40 INJECTION SUBCUTANEOUS
COMMUNITY
Start: 2024-04-24 | End: 2024-05-04 | Stop reason: HOSPADM

## 2024-05-03 RX ORDER — CEFAZOLIN SODIUM 1 G/50ML
SOLUTION INTRAVENOUS CONTINUOUS PRN
Status: COMPLETED | OUTPATIENT
Start: 2024-05-03 | End: 2024-05-03

## 2024-05-03 RX ORDER — LIDOCAINE 560 MG/1
1 PATCH PERCUTANEOUS; TOPICAL; TRANSDERMAL
COMMUNITY
Start: 2024-04-26 | End: 2024-05-04 | Stop reason: HOSPADM

## 2024-05-03 RX ORDER — MIDAZOLAM HYDROCHLORIDE 1 MG/ML
INJECTION, SOLUTION INTRAMUSCULAR; INTRAVENOUS AS NEEDED
Status: DISCONTINUED | OUTPATIENT
Start: 2024-05-03 | End: 2024-05-03 | Stop reason: HOSPADM

## 2024-05-03 RX ORDER — VANCOMYCIN HYDROCHLORIDE 1 G/200ML
INJECTION, SOLUTION INTRAVENOUS CONTINUOUS PRN
Status: COMPLETED | OUTPATIENT
Start: 2024-05-03 | End: 2024-05-03

## 2024-05-03 RX ORDER — DEXTROSE 50 % IN WATER (D50W) INTRAVENOUS SYRINGE
12.5
COMMUNITY
Start: 2024-04-24 | End: 2024-05-04 | Stop reason: HOSPADM

## 2024-05-03 RX ORDER — INSULIN LISPRO 100 [IU]/ML
INJECTION, SOLUTION INTRAVENOUS; SUBCUTANEOUS
COMMUNITY
Start: 2024-04-25 | End: 2024-05-04 | Stop reason: HOSPADM

## 2024-05-03 RX ORDER — LIDOCAINE HYDROCHLORIDE 20 MG/ML
INJECTION, SOLUTION INFILTRATION; PERINEURAL AS NEEDED
Status: DISCONTINUED | OUTPATIENT
Start: 2024-05-03 | End: 2024-05-03 | Stop reason: HOSPADM

## 2024-05-03 RX ADMIN — CEPHALEXIN 500 MG: 500 CAPSULE ORAL at 17:21

## 2024-05-03 RX ADMIN — ACETAMINOPHEN 650 MG: 325 TABLET ORAL at 22:49

## 2024-05-03 RX ADMIN — CEPHALEXIN 500 MG: 500 CAPSULE ORAL at 05:02

## 2024-05-03 RX ADMIN — LEVETIRACETAM 500 MG: 500 TABLET, FILM COATED ORAL at 17:21

## 2024-05-03 RX ADMIN — LEVETIRACETAM 500 MG: 500 TABLET, FILM COATED ORAL at 05:02

## 2024-05-03 RX ADMIN — CEPHALEXIN 500 MG: 500 CAPSULE ORAL at 22:47

## 2024-05-03 RX ADMIN — LAMOTRIGINE 50 MG: 100 TABLET ORAL at 17:21

## 2024-05-03 RX ADMIN — ACETAMINOPHEN 650 MG: 325 TABLET ORAL at 17:26

## 2024-05-03 RX ADMIN — LAMOTRIGINE 50 MG: 100 TABLET ORAL at 05:02

## 2024-05-03 RX ADMIN — CEPHALEXIN 500 MG: 500 CAPSULE ORAL at 11:36

## 2024-05-03 ASSESSMENT — COGNITIVE AND FUNCTIONAL STATUS - GENERAL
MOBILITY SCORE: 24
DAILY ACTIVITIY SCORE: 24
DAILY ACTIVITIY SCORE: 24
MOBILITY SCORE: 24

## 2024-05-03 ASSESSMENT — PAIN SCALES - GENERAL
PAINLEVEL_OUTOF10: 1
PAINLEVEL_OUTOF10: 0 - NO PAIN
PAINLEVEL_OUTOF10: 1
PAINLEVEL_OUTOF10: 4

## 2024-05-03 ASSESSMENT — PAIN - FUNCTIONAL ASSESSMENT
PAIN_FUNCTIONAL_ASSESSMENT: 0-10

## 2024-05-03 ASSESSMENT — PAIN DESCRIPTION - ORIENTATION: ORIENTATION: LEFT

## 2024-05-03 ASSESSMENT — PAIN DESCRIPTION - LOCATION: LOCATION: SHOULDER

## 2024-05-03 NOTE — PROGRESS NOTES
"Deonte Leavitt is a 69 y.o. male on day 1 of admission presenting with Pacemaker    Overnight:  No acute event overnight.  Cardiac electrophysiology recommended to keep the patient n.p.o. and taken for surgery in the morning.    Subjective   Patient was not seen this morning as he was in the OR for right RV lead revision.    Objective   Current Vitals  /76 (BP Location: Right arm, Patient Position: Lying)   Pulse 62   Temp 36.9 °C (98.4 °F) (Temporal)   Resp 18   Ht 1.727 m (5' 8\")   Wt 70 kg (154 lb 5.2 oz)   SpO2 94%   BMI 23.46 kg/m²      No intake/output data recorded.    Physical Exam  Blood pressure 152/68, pulse 67, temperature 36.8 °C (98.2 °F), temperature source Temporal, resp. rate (!) 29, height 1.727 m (5' 8\"), weight 70 kg (154 lb 5.2 oz), SpO2 94%.  Constitutional:       General: ANO x 3, patient drowsy post sedation. He is not in acute distress.     Appearance: Normal appearance.   HENT:      Head: Normocephalic and atraumatic.   Eyes:      Conjunctiva/sclera: Conjunctivae normal.   Cardiovascular:      Rate and Rhythm: Normal rate and regular rhythm.      Pulses: Normal pulses.      Heart sounds: Normal heart sounds.      Comments: Dressing and pacemaker to L upper chest, non tender to palpation, no surrounding erythema  Pulmonary:      Effort: Pulmonary effort is normal. No respiratory distress.      Breath sounds: Normal breath sounds.   Abdominal:      General: Abdomen is flat. There is no distension.      Palpations: Abdomen is soft.      Tenderness: There is no abdominal tenderness. There is no guarding.   Musculoskeletal:         General: No swelling or deformity. Normal range of motion.      Cervical back: Normal range of motion and neck supple.   Skin:     General: Skin is warm and dry.   Neurological:      General: No focal deficit present.      Mental Status: He is alert and oriented to person, place, and time. Mental status is at baseline.   Psychiatric:         Mood and " Affect: Mood normal.         Behavior: Behavior normal.    Relevant Results      Labs:  Most recent  CBC: WBC 8.0 , HGB 13.9,   BMP: , K 4.4, Cl 98, HCO3 31, BUN 14, CR 1.00, Glu 88  LFTS: AST 20 , ALT 33, ALKPHOS 97 , TBILI 0.5 , DBILI No results found for requested labs within last 365 days.  TROP: 32  BNP: No results found for requested labs within last 365 days.  COAGS: PT No results found for requested labs within last 365 days. , PTT No results found for requested labs within last 365 days.  , INR No results found for requested labs within last 365 days.  UA:     ABG:    CALCIUM 9.5 MAG No results found for requested labs within last 365 days. ALB 4.0 LACTATE No results found for requested labs within last 365 days. PHOS No results found for requested labs within last 365 days. COVIDNo results found for requested labs within last 365 days.    Micro/culture data:  No results found for the last 90 days.      Imaging:  ECG 12 lead  See ED provider note for full interpretation and clinical correlation  Confirmed by Deonte Huang ([id number]) on 5/3/2024 12:05:30 AM           MEDS:  Scheduled medications  cephalexin, 500 mg, oral, 4x daily  lamoTRIgine, 50 mg, oral, BID  levETIRAcetam, 500 mg, oral, BID  polyethylene glycol, 17 g, oral, Daily  tamsulosin, 0.4 mg, oral, Daily      Continuous medications  ceFAZolin in dextrose (iso-os), , Last Rate: 1 g (05/03/24 0835)  sodium chloride 0.9%, , Last Rate: 15 mL/hr (05/03/24 0836)  vancomycin, , Last Rate: 1 g (05/03/24 0836)      PRN medications  PRN medications: acetaminophen **OR** acetaminophen **OR** acetaminophen, ceFAZolin in dextrose (iso-os), fentaNYL PF, lidocaine, LORazepam, midazolam, sodium chloride 0.9%, vancomycin     Assessment/Plan:   69 y.o. male with PMHx of epilepsy (currently on Keppra 500mg BID and lamotrigine 50mg BID) with medication noncompliance, who developed symptomatic high-grade AVB and transient CHB (NO available ECG) requiring TVP  placement in [facility] so that EP underwent successful Abbott dcPPM implantation on 4/26/2024. Since that, patient did not develop any recurrent cardiopulmonary symptoms but Walter notified patient that his RV lead has significant noise with possible malfunctions. Therefore, patient presented to [facility] ED for the further evaluations.  Regarding patient epilepsy will consider outpatient EEG and follow up with epileptologist to further classify seizure.      Updates 5/3:  -Follow cardiac electrophysiology recs.  -Patient was taken to the OR for RV lead revision.    NEUROLOGY/ PSYCH:  #Epilepsy (uncontrolled)  (currently on Keppra 500mg BID and lamotrigine 50mg BID)  -Questionable medication compliance.  -Obtain OSH recored  -Consider EEG/ neuroimaging  -Obtain keppra level  -Seizure 1 dialpesis rapid breathing and snoring lasting around 2 minutes no clear aura or postictal state. Patient has a video of the spell.  -Seizure 2 generalized tonic seizure eyes were open sometimes associated with urinary and/or fecal incontinence.  Denied tongue biting.  -Frequency 5 seizures not sure which semiology patient has a seizure diary.     CARDIOVASCULAR:  #High-grade AVB  - CHB s/p dcPPM implantation on 4/26/2024.  No cardiopulmonary symptoms but Walter notified patient that his RV lead has significant noise with possible malfunctions.  -Consult cardiac electrophysiology, appreciate recs  - NPO at midnight for possible RV lead revision  - Continue to monitor on telemetry  - Continue Keflex 500mg BID (original planned end-date 5/3/2024    Pain medications: PRN Tylenol  Fluids: Replete PRN  Electrolytes: Keep mg >2, phos >3  and K >4  Nutrition:  NPO Diet; Effective midnight  Adult diet Regular, 2-3 grams sodium, Cardiac; 70 gm fat; 2 - 3 grams Sodium   Antimicrobials: None  DVT PPX: SCD's  GI ppx: none needed  Bowel care: Miralax  Catheter: None  Lines: PIV  Oxygen: Room Air     Disposition:   Home/    Code Status: Full Code  (confirmed on admission)   NOK:  Primary Emergency Contact: AMAYA RAO, Home Phone: [phone]       Maurice Pryor MD'

## 2024-05-03 NOTE — CARE PLAN
The clinical goals for the shift include Patient will be free from seizures and remain HDS throughout the shift    Over the shift, the patient did not make progress toward the following goals. Barriers to progression include none. Recommendations to address these barriers include none.      Problem: Pain  Goal: My pain/discomfort is manageable  Outcome: Progressing     Problem: Safety  Goal: Patient will be injury free during hospitalization  Outcome: Progressing  Goal: I will remain free of falls  Outcome: Progressing     Problem: Daily Care  Goal: Daily care needs are met  Outcome: Progressing     Problem: Psychosocial Needs  Goal: Demonstrates ability to cope with hospitalization/illness  Outcome: Progressing  Goal: Collaborate with me, my family, and caregiver to identify my specific goals  Outcome: Progressing     Problem: Discharge Barriers  Goal: My discharge needs are met  Outcome: Progressing     Problem: Pacemaker  Goal: My safety and comfort will be maintained  Outcome: Progressing     Problem: Seizure  Goal: I will remain free from seizures  Outcome: Progressing

## 2024-05-03 NOTE — PROGRESS NOTES
Pharmacy Admission Order Reconciliation Review    Deonte Leavitt is a 69 y.o. male admitted for Pacemaker. Pharmacy reviewed the patient's unreconciled admission medications.    Prior to admission medications that were reviewed and acted on by the pharmacist include:  Lamictal  Lidocaine patch  Nicotine patch  Cathflo      These medications have been reconciled.     Any other unreconcilied medications have been addressed and will be ordered or held by the patient's medical team. Medications addressed by the pharmacist may be added or changed by the patient's medical team at any time.    Freida Begum, PharmD  Transitions of Care Pharmacist  [facility]  Please reach out via Secure Chat for questions

## 2024-05-03 NOTE — HOSPITAL COURSE
Deonte Leavitt is a 69 y.o. male with PMHx of epilepsy (currently on Keppra 500mg BID and lamotrigine 50mg BID), who developed symptomatic high-grade AVB and transient CHB (NO available ECG) requiring TVP placement in [facility] so that EP underwent successful Abbott dcPPM implantation on 4/26/2024. Patient did not develop any recurrent cardiopulmonary symptoms since discharge however Walter notified patient that his RV lead has significant noise with possible malfunctions. Therefore, patient presented to [facility] ED for the further evaluations and EP was consulted.  Underwent revision of the RV lead, which was successful, interrogation showed good function of the lead and no complication.    It was also noted that he has episodes of transient LOC during which there is no clear tonic-clonic activity.  His Keppra level was low at 7.  Requested neurology to evaluate in case requires adjustment to his Keppra.  Because he did not have what appears to be a seizure, but more likely related to transient CHB from malfunctioning RV lead.  They did not advise any change and to continue outpatient follow up.

## 2024-05-04 ENCOUNTER — APPOINTMENT (OUTPATIENT)
Dept: RADIOLOGY | Facility: HOSPITAL | Age: 69
End: 2024-05-04

## 2024-05-04 VITALS
SYSTOLIC BLOOD PRESSURE: 103 MMHG | DIASTOLIC BLOOD PRESSURE: 68 MMHG | TEMPERATURE: 96.8 F | OXYGEN SATURATION: 96 % | HEIGHT: 68 IN | WEIGHT: 157.41 LBS | BODY MASS INDEX: 23.86 KG/M2 | RESPIRATION RATE: 18 BRPM | HEART RATE: 80 BPM

## 2024-05-04 PROCEDURE — G0378 HOSPITAL OBSERVATION PER HR: HCPCS

## 2024-05-04 PROCEDURE — 2500000001 HC RX 250 WO HCPCS SELF ADMINISTERED DRUGS (ALT 637 FOR MEDICARE OP)

## 2024-05-04 PROCEDURE — 71046 X-RAY EXAM CHEST 2 VIEWS: CPT | Performed by: RADIOLOGY

## 2024-05-04 PROCEDURE — 71046 X-RAY EXAM CHEST 2 VIEWS: CPT

## 2024-05-04 PROCEDURE — 99238 HOSP IP/OBS DSCHRG MGMT 30/<: CPT | Performed by: STUDENT IN AN ORGANIZED HEALTH CARE EDUCATION/TRAINING PROGRAM

## 2024-05-04 RX ORDER — CEPHALEXIN 500 MG/1
500 CAPSULE ORAL 4 TIMES DAILY
Qty: 20 CAPSULE | Refills: 0 | Status: SHIPPED | OUTPATIENT
Start: 2024-05-04 | End: 2024-05-15 | Stop reason: HOSPADM

## 2024-05-04 RX ORDER — CEPHALEXIN 500 MG/1
500 CAPSULE ORAL 4 TIMES DAILY
Qty: 20 CAPSULE | Refills: 0 | Status: SHIPPED | OUTPATIENT
Start: 2024-05-04 | End: 2024-05-04 | Stop reason: HOSPADM

## 2024-05-04 RX ADMIN — LAMOTRIGINE 50 MG: 100 TABLET ORAL at 04:34

## 2024-05-04 RX ADMIN — LEVETIRACETAM 500 MG: 500 TABLET, FILM COATED ORAL at 04:34

## 2024-05-04 RX ADMIN — CEPHALEXIN 500 MG: 500 CAPSULE ORAL at 11:01

## 2024-05-04 RX ADMIN — ACETAMINOPHEN 650 MG: 325 TABLET ORAL at 11:01

## 2024-05-04 RX ADMIN — ACETAMINOPHEN 650 MG: 325 TABLET ORAL at 05:34

## 2024-05-04 RX ADMIN — CEPHALEXIN 500 MG: 500 CAPSULE ORAL at 04:34

## 2024-05-04 ASSESSMENT — COGNITIVE AND FUNCTIONAL STATUS - GENERAL
MOBILITY SCORE: 24
DAILY ACTIVITIY SCORE: 24

## 2024-05-04 ASSESSMENT — PAIN SCALES - GENERAL
PAINLEVEL_OUTOF10: 0 - NO PAIN
PAINLEVEL_OUTOF10: 0 - NO PAIN
PAINLEVEL_OUTOF10: 3
PAINLEVEL_OUTOF10: 5 - MODERATE PAIN

## 2024-05-04 ASSESSMENT — PAIN - FUNCTIONAL ASSESSMENT
PAIN_FUNCTIONAL_ASSESSMENT: 0-10

## 2024-05-04 ASSESSMENT — PAIN DESCRIPTION - LOCATION: LOCATION: HEAD

## 2024-05-04 NOTE — PROGRESS NOTES
"Subjective Data:  Patient denied acute cardiopulmonary symptoms this morning.    Overnight Events:    NONE     Objective Data:  Last Recorded Vitals:  Vitals:    05/03/24 1922 05/03/24 2317 05/04/24 0502 05/04/24 0700   BP: 154/77 106/60 126/89 128/74   BP Location: Right arm Right arm Right arm Right arm   Patient Position: Lying Lying Lying Sitting   Pulse: 77 75 76 75   Resp: 18 19 18 18   Temp: 36 °C (96.8 °F) 36.2 °C (97.2 °F) 37.1 °C (98.8 °F) 35.9 °C (96.7 °F)   TempSrc: Temporal Temporal Temporal Temporal   SpO2: 95% 95% 95% 96%   Weight:   71.4 kg (157 lb 6.5 oz)    Height:           Last Labs:  CBC - 5/3/2024:  6:52 AM  8.0 13.9 271    42.3      CMP - 5/3/2024:  6:52 AM  9.5 6.7 20 --- 0.5   3.5 4.0 33 97      PTT - No results in last year.  _   _ _     TROPHS   Date/Time Value Ref Range Status   04/24/2024 12:51 PM 32 0 - 20 ng/L Final   04/24/2024 09:53 AM 29 0 - 20 ng/L Final     LDLCALC   Date/Time Value Ref Range Status   05/31/2022 12:47 PM 71 65 - 130 MG/DL Final   02/11/2022 03:43  65 - 130 MG/DL Final      Last I/O:  I/O last 3 completed shifts:  In: 347.1 (4.9 mL/kg) [P.O.:240; I.V.:107.1 (1.5 mL/kg)]  Out: 50 (0.7 mL/kg) [Blood:50]  Weight: 71.4 kg     Past Cardiology Tests (Last 3 Years):  EKG:  ECG 12 lead 05/02/2024      Electrocardiogram, 12-lead PRN ACS symptoms 04/24/2024 (Preliminary)    Echo:  Transthoracic Echo (TTE) Complete 04/24/2024    Ejection Fractions:  No results found for: \"EF\"  Cath:  Cardiac Catheterization Procedure 04/24/2024    Stress Test:  No results found for this or any previous visit from the past 1095 days.    Cardiac Imaging:  No results found for this or any previous visit from the past 1095 days.      Inpatient Medications:  Scheduled medications   Medication Dose Route Frequency    cephalexin  500 mg oral 4x daily    lamoTRIgine  50 mg oral BID    levETIRAcetam  500 mg oral BID    polyethylene glycol  17 g oral Daily    tamsulosin  0.4 mg oral Daily     PRN " medications   Medication    acetaminophen    Or    acetaminophen    Or    acetaminophen    LORazepam     Continuous Medications   Medication Dose Last Rate       Physical Exam:  GA: not in acute distress, AAO x3  CV: NO JVD, normal S1/S2, no significant heart murmurs, CTAB  Ext: NO pretibial pitting edema bilaterally  Neuro: grossly intact  Skin: NO significant tenderness, bleeding, oozing, hematoma at L CIED pocket     Assessment/Plan   Deonte Leavitt is a 69 y.o. male with PMH for seizures (currently on Keppra), who developed post-seizure symptomatic high-grade AVB and transient CHB (NO available ECG) requiring TVP placement in [facility] so that EP underwent successful Abbott dcPPM implantation on 4/26/2024. After he was discharged home, patient was doing good but Walter's remote interrogation found his RV lead having significant noise with possible malfunctions. Therefore, patient presented to [facility] ED for the further evaluations and EP was consulted.    On 5/3/2024, EP underwent successful removal of the malfunctioning RV lead and implant of a new RV lead in a mid - septal position with the existing RA lead connected to an Abbott dual chamber PPM system. Next morning, the CIED interrogation, CXR, and the wound check were unremarkable.     # RV lead malfunction  # s/p uccessful removal of the malfunctioning RV lead and implant of a new RV lead in a mid - septal position with the existing RA lead connected to an Abbott dual chamber PPM system on 5/3/2024  - From EP stand point, this patient is ready for discharging home now.  - Please include Keflex 500 mg QID x 7 days into his discharge medications.     Thank you for this consultation and EP will sign off this case today.     Hansa Pickens MD  Clinical Cardiac Electrophysiology Fellow  Peripheral IV 05/03/24 20 G Right;Anterior Forearm (Active)   Site Assessment Clean;Dry;Intact 05/03/24 1935   Dressing Type Transparent 05/03/24 1935   Line Status Flushed  05/03/24 1935   Dressing Status Clean;Dry;Occlusive 05/03/24 1935   Number of days: 1       Code Status:  Full Code    I spent 30 minutes in the professional and overall care of this patient.    Hansa Pickens MD    I saw and evaluated the patient. I personally obtained the key and critical portions of the history and physical exam or was physically present for key and critical portions performed by the resident/fellow. I reviewed the resident/fellow's documentation and discussed the patient with the resident/fellow. I agree with the resident/fellow's medical decision making as documented in the note, and my edits (bold and italic) have been made to the document as needed.     Ji Ambrose MD [facility]  Cardiac Electrophysiology    **Disclaimer: This note was dictated by speech recognition, and every effort has been made to prevent any error in transcription, however minor errors may be present**

## 2024-05-04 NOTE — DISCHARGE SUMMARY
Discharge Diagnosis  Pacemaker    Issues Requiring Follow-Up  Issues requiring follow-up:  - Follow-up with cardiac electrophysiology.  - Cont Keflex for 5 more days.  (Pt has supply of 4 pills still; requested sent to his [facility]).  - Consider obtaining EEG as outpatient and neurology follow up for plan RE antiepileptics.    Test Results Pending At Discharge  Pending Labs       No current pending labs.            Hospital Course  Deonte Leavitt is a 69 y.o. male with PMHx of epilepsy (currently on Keppra 500mg BID and lamotrigine 50mg BID), who developed symptomatic high-grade AVB and transient CHB (NO available ECG) requiring TVP placement in [facility] so that EP underwent successful Abbott dcPPM implantation on 4/26/2024. Patient did not develop any recurrent cardiopulmonary symptoms since discharge however Walter notified patient that his RV lead has significant noise with possible malfunctions. Therefore, patient presented to [facility] ED for the further evaluations and EP was consulted.  Underwent revision of the RV lead, which was successful, interrogation showed good function of the lead and no complication.    It was also noted that he has episodes of transient LOC during which there is no clear tonic-clonic activity.  His Keppra level was low at 7.  Requested neurology to evaluate in case requires adjustment to his Keppra.  Because he did not have what appears to be a seizure, but more likely related to transient CHB from malfunctioning RV lead.  They did not advise any change and to continue outpatient follow up.    Pertinent Physical Exam At Time of Discharge  Physical Exam  Constitutional:       General: He is not in acute distress.     Appearance: He is not toxic-appearing.   HENT:      Head: Normocephalic and atraumatic.      Mouth/Throat:      Mouth: Mucous membranes are dry.   Eyes:      Extraocular Movements: Extraocular movements intact.   Cardiovascular:      Rate and Rhythm: Normal rate and  regular rhythm.      Heart sounds: No murmur heard.     No gallop.   Pulmonary:      Effort: Pulmonary effort is normal.      Breath sounds: Normal breath sounds.   Abdominal:      General: Abdomen is flat.      Palpations: Abdomen is soft.   Musculoskeletal:         General: No swelling or deformity.   Skin:     General: Skin is warm and dry.   Neurological:      General: No focal deficit present.      Mental Status: He is alert.   Psychiatric:         Mood and Affect: Mood normal.       Home Medications     Medication List      START taking these medications     * cephalexin 500 mg capsule; Commonly known as: Keflex; Take 1 capsule   (500 mg) by mouth 4 times a day for 5 days.  * This list has 1 medication(s) that are the same as other medications   prescribed for you. Read the directions carefully, and ask your doctor or   other care provider to review them with you.     CHANGE how you take these medications     lamoTRIgine 25 mg tablet; Commonly known as: LaMICtal; Take 2 tablets   (50 mg) by mouth 2 times a day. Per pt, cut his dose down without speaking   with provider; What changed: how much to take, Another medication with the   same name was removed. Continue taking this medication, and follow the   directions you see here.     CONTINUE taking these medications     levETIRAcetam 500 mg tablet; Commonly known as: Keppra     STOP taking these medications     alteplase 2 mg injection; Commonly known as: Cathflo Activase   dextrose 50 % injection   enoxaparin 40 mg/0.4 mL syringe; Commonly known as: Lovenox   glucagon 1 mg injection; Commonly known as: Glucagen   insulin lispro 100 unit/mL injection; Commonly known as: HumaLOG   lidocaine 4 % patch   nicotine 14 mg/24 hr patch; Commonly known as: Nicoderm CQ   trimethobenzamide 100 mg/mL injection; Commonly known as: Tigan     ASK your doctor about these medications     * cephalexin 500 mg capsule; Commonly known as: Keflex; Take 1 capsule   (500 mg) by mouth 4  times a day for 7 days.; Ask about: Should I take this   medication?  * This list has 1 medication(s) that are the same as other medications   prescribed for you. Read the directions carefully, and ask your doctor or   other care provider to review them with you.       Outpatient Follow-Up  Future Appointments   Date Time Provider Department [place]   5/7/2024 10:30 AM DI Andersen-CNP [address]   5/28/2024  3:00 PM [facility] [place] [address] [address]     Les than 30 minutes were spent reviewing the patient's case and coordinating his medications, follow-ups, and transportation for the discharge plan.      Dustin Bundy MD

## 2024-05-04 NOTE — CARE PLAN
The patient's goals for the shift include      The clinical goals for the shift include pt will have no discomfort from L chest incision through shift    Over the shift, the patient did not make progress toward the following goals. Barriers to progression include patient needs observation after revised RV lead. Recommendations to address these barriers include observation and possible discharge on Saturday .

## 2024-05-04 NOTE — CARE PLAN
Pt discharged home with no home care needs. Pt verbalized understanding of discharge instructions, diet, activity, medications, incision site care, and follow up appointments.

## 2024-05-04 NOTE — CONSULTS
History Of Present Illness  Deonte Leavitt is a 69 y.o. male with a history of seizures (on -500, LTG 50-50), high-grade AVB/transient CHB (s/p PPM placement) presenting with PPM RV lead malfunction requiring removal and revision. Epilepsy has been consulted for events concerning for possible seizure and low keppra levels. Per primary team, the patient sees a neurologist at OSH (no records available for review). He has been reporting episodes of LOC occurring frequently. Keppra level this admission was 7. The patient was supposed to up-titrate on lamotrigine as an outpatient, but actually lowered his dosage on his own to 50 mg BID. Seizures have been documented as possible dialepsis -> rapid breathing/snoring lasting for 2 minutes. He also possibly has generalized tonic-clonic seizures. He follows with Dr. Roc Cohen at [facility] outpatient.     On interview, the patient denies any history of epilepsy risk factors including head trauma, stroke, meningitis/encephalitis, neurosurgical procedures. He developed spells starting about 2 years ago at the age of 67 with no known prior history of seizures. Per the patient, he experiences 10-15 seconds of a tingling sensation in his head. He will then voluntarily lower himself to lay down on the floor. He then loses consciousness, with his next memory of being woken up a few minutes later. He has no other memory of the events. He does have several cell phone videos of the episodes which shows the patient lay down on the floor, lose consciousness, then start snoring. After 1-2 minutes, he then regains consciousness and appears awake, alert, speaking, and interacting. He has possibly had brief stiffening during the event, but it is not apparent in the provided videos. He would previously have the event every few months, usually occurring while standing or going to the bathroom, but in April 2024 he was having the events multiple times per week. He had  urinary incontinence with one episode, but otherwise denies any tongue biting. He has no convulsive activity.     Of note, he was diagnosed with CHB recently at the end of April following one of these events. While it is presumed that he had a post-seizure induced CHB, this is unclear as his events have never been confirmed as seizures. He reportedly had MRI and EEG done, but unfortunately no records from his outpatient are available for review. He has remained on Keppra and LTG in the interim, but he has had NO events since having his PPM placed.     Review of Systems:  14 point review of systems conducted and negative other than noted in HPI.    Past Medical History  Past Medical History:   Diagnosis Date    Seizures (Multi)      Surgical History  Past Surgical History:   Procedure Laterality Date    CARDIAC ELECTROPHYSIOLOGY PROCEDURE N/A 2024    Procedure: Temporary Pacemaker Insertion;  Surgeon: León Park MD;  Location: [facility] Cardiac Cath Lab;  Service: Cardiovascular;  Laterality: N/A;    CARDIAC ELECTROPHYSIOLOGY PROCEDURE N/A 2024    Procedure: PPM IMPLANT DUAL;  Surgeon: Sandoval Jennings MD;  Location: [facility] Cardiac Cath Lab;  Service: Electrophysiology;  Laterality: N/A;     Medications  Medications Prior to Admission   Medication Sig Dispense Refill Last Dose    [] cephalexin (Keflex) 500 mg capsule Take 1 capsule (500 mg) by mouth 4 times a day for 7 days. 28 capsule 0 2024    levETIRAcetam (Keppra) 500 mg tablet Take 1 tablet (500 mg) by mouth 2 times a day.   2024    alteplase (Cathflo Activase) 2 mg injection 2 mL (2 mg) by intra-catheter route.       dextrose 50 % injection Infuse 25 mL (12.5 g) into a venous catheter.       enoxaparin (Lovenox) 40 mg/0.4 mL syringe Inject 0.4 mL (40 mg) under the skin.       glucagon 1 mg injection Inject 1 mg into the muscle.       insulin lispro (HumaLOG) 100 unit/mL injection Inject under the skin.       lamoTRIgine (LaMICtal)  "100 mg tablet Take 1 tablet (100 mg) by mouth 2 times a day.   Not Taking    lidocaine 4 % patch Place 1 patch on the skin once daily.       nicotine (Nicoderm CQ) 14 mg/24 hr patch Place 1 patch on the skin once daily.       trimethobenzamide (Tigan) 100 mg/mL injection Inject 2 mL (200 mg) into the muscle every 6 hours if needed.       [DISCONTINUED] lamoTRIgine (LaMICtal) 25 mg tablet Take 3 tablets (75 mg) by mouth 2 times a day. Per pt, cut his dose down without speaking with provider          Current Facility-Administered Medications:     acetaminophen (Tylenol) tablet 650 mg, 650 mg, oral, q4h PRN, 650 mg at 05/04/24 1101 **OR** acetaminophen (Tylenol) oral liquid 650 mg, 650 mg, nasogastric tube, q4h PRN **OR** acetaminophen (Tylenol) suppository 650 mg, 650 mg, rectal, q4h PRN, Maurice Pryor MD    cephalexin (Keflex) capsule 500 mg, 500 mg, oral, 4x daily, Dale France MD, 500 mg at 05/04/24 1101    lamoTRIgine (LaMICtal) tablet 50 mg, 50 mg, oral, BID, Dale France MD, 50 mg at 05/04/24 0434    levETIRAcetam (Keppra) tablet 500 mg, 500 mg, oral, BID, Dale France MD, 500 mg at 05/04/24 0434    LORazepam (Ativan) injection 1 mg, 1 mg, intravenous, 4x daily PRN, Maurice Pryor MD    polyethylene glycol (Glycolax, Miralax) packet 17 g, 17 g, oral, Daily, Dale France MD    tamsulosin (Flomax) 24 hr capsule 0.4 mg, 0.4 mg, oral, Daily, Dale France MD  Social History  Social History     Tobacco Use    Smoking status: Every Day     Current packs/day: 1.00     Types: Cigarettes    Smokeless tobacco: Never   Substance Use Topics    Alcohol use: Not Currently     Allergies  Patient has no known allergies.    Last Recorded Vitals  Blood pressure 128/74, pulse 75, temperature 35.9 °C (96.7 °F), temperature source Temporal, resp. rate 18, height 1.727 m (5' 8\"), weight 71.4 kg (157 lb 6.5 oz), SpO2 96%.    NEUROLOGIC EXAM:    MENTAL STATUS:  - Alert and oriented to person, place, and " time  - Recall of recent events intact.   - Speech is fluent. Follows complex commands.     CRANIAL NERVES:  - CN I: Not Assessed  - CN II: Pupils constrict to light bilaterally 3->2 mm, visual fields intact bilaterally  - CN III, IV, VI: Extraocular movements intact without nystagmus  - CN V: Facial sensation intact to light touch  - CN VII: No facial droop, closes eyes against resistance  - CN VIII: Hearing intact to voice  - CN IX, X: Palate elevates symmetrically  - CN XII: Tongue midline without atrophy or fasciculations    MOTOR:  - No tremors or abnormal movements  - Normal bulk and tone throughout  - Strength: R                L  Shoulder Abd 5                5  Shoulder Add 5                5  Elbow Flex 5                5  Elbow Ext  5                5  Hip flexion 5                5  Knee Ext  5                5  DorsiFlex 5                5  PlantarFlex       5                5    REFLEXES:   L                R  Biceps            +2               +2  Brachioradialis+2               +2  Patellar            +2               +2    COORDINATION: Finger to nose intact bilaterally  SENSATION: Intact and symmetric to light touch in all extremities  GAIT: Normal stride and stance, ambulated without assist, tandem walks without instability    Relevant Results  Results for orders placed or performed during the hospital encounter of 05/02/24 (from the past 24 hour(s))   POCT GLUCOSE   Result Value Ref Range    POCT Glucose 86 74 - 99 mg/dL   Cardiac Device Check - Inpatient   Result Value Ref Range    BSA 1.83 m2   Levetiracetam   Result Value Ref Range    Keppra 7 (L) 10 - 40 ug/mL         I have personally reviewed the following imaging results: No new recent pertinent neuroimaging.     Assessment  Deonte Leavitt is a 69 y.o. male with a history of seizures (on -500, LTG 50-50), high-grade AVB/transient CHB (s/p PPM placement) presenting with PPM RV lead malfunction requiring removal and revision. Epilepsy  has been consulted for events concerning for possible seizure and low keppra level. Videos and clinical history is not wholly convincing of epileptic events. He essentially is having intermittent 1-2 minute episodes of LOC with relatively quick return to baseline in the setting of CHB. He does have some unusual snoring and stiffening, although I did not appreciate clear epileptic activity on video evaluation. This is more consistent with possible syncope; it is suspicious that his events entirely resolved on having a PPM placed (thus far). That being said, cannot entirely rule out seizures without further EEG evidence. He has not had an event since 4/26 and has a normal neurologic exam. He could benefit from a possible EMU admission as an outpatient if he continues to have events.      Recommendations  - Continue Keppra, 500 mg BID  - Continue Lamotrigine per outpatient neurologist  - Routine EEG, if being discharged may be done outpatient  - Would recommend future EMU admission if continued events for event characterization, defer to outpatient neurologist  - Follow up with Dr. Cohen    *Due to consult being placed shortly prior to discharge, these recommendations were unable to be formally staffed with attending    Epilepsy: [id number]  Cole Salomon MD, Neurology Resident

## 2024-05-07 ENCOUNTER — APPOINTMENT (OUTPATIENT)
Dept: CARDIOLOGY | Facility: HOSPITAL | Age: 69
End: 2024-05-07

## 2024-05-08 LAB
ATRIAL RATE: 78 BPM
P AXIS: -18 DEGREES
Q ONSET: 183 MS
QRS COUNT: 13 BEATS
QRS DURATION: 202 MS
QT INTERVAL: 484 MS
QTC CALCULATION(BAZETT): 554 MS
QTC FREDERICIA: 530 MS
R AXIS: 126 DEGREES
T AXIS: -45 DEGREES
T OFFSET: 425 MS
VENTRICULAR RATE: 79 BPM

## 2024-05-10 DIAGNOSIS — M79.89 SWELLING OF ARM: ICD-10-CM

## 2024-05-10 DIAGNOSIS — Z95.0 PRESENCE OF CARDIAC PACEMAKER: ICD-10-CM

## 2024-05-13 ENCOUNTER — HOSPITAL ENCOUNTER (OUTPATIENT)
Dept: VASCULAR MEDICINE | Facility: HOSPITAL | Age: 69
Discharge: HOME | End: 2024-05-13

## 2024-05-13 ENCOUNTER — APPOINTMENT (OUTPATIENT)
Dept: CARDIOLOGY | Facility: HOSPITAL | Age: 69
End: 2024-05-13

## 2024-05-13 ENCOUNTER — APPOINTMENT (OUTPATIENT)
Dept: RADIOLOGY | Facility: HOSPITAL | Age: 69
End: 2024-05-13

## 2024-05-13 ENCOUNTER — HOSPITAL ENCOUNTER (OUTPATIENT)
Facility: HOSPITAL | Age: 69
Setting detail: OBSERVATION
LOS: 1 days | Discharge: HOME | End: 2024-05-15
Attending: EMERGENCY MEDICINE | Admitting: STUDENT IN AN ORGANIZED HEALTH CARE EDUCATION/TRAINING PROGRAM

## 2024-05-13 ENCOUNTER — OFFICE VISIT (OUTPATIENT)
Dept: CARDIOLOGY | Facility: HOSPITAL | Age: 69
End: 2024-05-13

## 2024-05-13 ENCOUNTER — TELEPHONE (OUTPATIENT)
Dept: CARDIOLOGY | Facility: HOSPITAL | Age: 69
End: 2024-05-13

## 2024-05-13 VITALS
DIASTOLIC BLOOD PRESSURE: 73 MMHG | HEART RATE: 72 BPM | WEIGHT: 177.03 LBS | OXYGEN SATURATION: 98 % | SYSTOLIC BLOOD PRESSURE: 144 MMHG | BODY MASS INDEX: 26.92 KG/M2

## 2024-05-13 DIAGNOSIS — M79.89 SWELLING OF ARM: ICD-10-CM

## 2024-05-13 DIAGNOSIS — I82.A12 ACUTE DEEP VEIN THROMBOSIS (DVT) OF AXILLARY VEIN OF LEFT UPPER EXTREMITY (MULTI): Primary | ICD-10-CM

## 2024-05-13 DIAGNOSIS — Z95.0 PACEMAKER: ICD-10-CM

## 2024-05-13 DIAGNOSIS — I97.89: Primary | ICD-10-CM

## 2024-05-13 DIAGNOSIS — I82.622: Primary | ICD-10-CM

## 2024-05-13 DIAGNOSIS — Z95.0 PRESENCE OF CARDIAC PACEMAKER: ICD-10-CM

## 2024-05-13 LAB
ALBUMIN SERPL BCP-MCNC: 4 G/DL (ref 3.4–5)
ALP SERPL-CCNC: 127 U/L (ref 33–136)
ALT SERPL W P-5'-P-CCNC: 75 U/L (ref 10–52)
ANION GAP SERPL CALC-SCNC: 14 MMOL/L (ref 10–20)
AST SERPL W P-5'-P-CCNC: 32 U/L (ref 9–39)
ATRIAL RATE: 69 BPM
BASOPHILS # BLD AUTO: 0.05 X10*3/UL (ref 0–0.1)
BASOPHILS NFR BLD AUTO: 0.6 %
BILIRUB SERPL-MCNC: 0.3 MG/DL (ref 0–1.2)
BUN SERPL-MCNC: 17 MG/DL (ref 6–23)
CALCIUM SERPL-MCNC: 9.5 MG/DL (ref 8.6–10.3)
CHLORIDE SERPL-SCNC: 102 MMOL/L (ref 98–107)
CO2 SERPL-SCNC: 29 MMOL/L (ref 21–32)
CREAT SERPL-MCNC: 1 MG/DL (ref 0.5–1.3)
EGFRCR SERPLBLD CKD-EPI 2021: 82 ML/MIN/1.73M*2
EOSINOPHIL # BLD AUTO: 0.16 X10*3/UL (ref 0–0.7)
EOSINOPHIL NFR BLD AUTO: 2 %
ERYTHROCYTE [DISTWIDTH] IN BLOOD BY AUTOMATED COUNT: 12.7 % (ref 11.5–14.5)
GLUCOSE SERPL-MCNC: 80 MG/DL (ref 74–99)
HCT VFR BLD AUTO: 42.4 % (ref 41–52)
HGB BLD-MCNC: 13.7 G/DL (ref 13.5–17.5)
IMM GRANULOCYTES # BLD AUTO: 0.04 X10*3/UL (ref 0–0.7)
IMM GRANULOCYTES NFR BLD AUTO: 0.5 % (ref 0–0.9)
LYMPHOCYTES # BLD AUTO: 2.45 X10*3/UL (ref 1.2–4.8)
LYMPHOCYTES NFR BLD AUTO: 31.2 %
MAGNESIUM SERPL-MCNC: 2 MG/DL (ref 1.6–2.4)
MCH RBC QN AUTO: 30.2 PG (ref 26–34)
MCHC RBC AUTO-ENTMCNC: 32.3 G/DL (ref 32–36)
MCV RBC AUTO: 94 FL (ref 80–100)
MONOCYTES # BLD AUTO: 0.91 X10*3/UL (ref 0.1–1)
MONOCYTES NFR BLD AUTO: 11.6 %
NEUTROPHILS # BLD AUTO: 4.24 X10*3/UL (ref 1.2–7.7)
NEUTROPHILS NFR BLD AUTO: 54.1 %
NRBC BLD-RTO: 0 /100 WBCS (ref 0–0)
P AXIS: 71 DEGREES
P OFFSET: 177 MS
P ONSET: 117 MS
PHOSPHATE SERPL-MCNC: 3.9 MG/DL (ref 2.5–4.9)
PLATELET # BLD AUTO: 525 X10*3/UL (ref 150–450)
POTASSIUM SERPL-SCNC: 4.5 MMOL/L (ref 3.5–5.3)
PR INTERVAL: 220 MS
PROT SERPL-MCNC: 7.3 G/DL (ref 6.4–8.2)
Q ONSET: 227 MS
QRS COUNT: 11 BEATS
QRS DURATION: 176 MS
QT INTERVAL: 424 MS
QTC CALCULATION(BAZETT): 454 MS
QTC FREDERICIA: 444 MS
R AXIS: 269 DEGREES
RBC # BLD AUTO: 4.53 X10*6/UL (ref 4.5–5.9)
SODIUM SERPL-SCNC: 140 MMOL/L (ref 136–145)
T AXIS: 56 DEGREES
T OFFSET: 439 MS
VENTRICULAR RATE: 69 BPM
WBC # BLD AUTO: 7.9 X10*3/UL (ref 4.4–11.3)

## 2024-05-13 PROCEDURE — 36415 COLL VENOUS BLD VENIPUNCTURE: CPT | Performed by: STUDENT IN AN ORGANIZED HEALTH CARE EDUCATION/TRAINING PROGRAM

## 2024-05-13 PROCEDURE — 2500000004 HC RX 250 GENERAL PHARMACY W/ HCPCS (ALT 636 FOR OP/ED): Performed by: STUDENT IN AN ORGANIZED HEALTH CARE EDUCATION/TRAINING PROGRAM

## 2024-05-13 PROCEDURE — 93005 ELECTROCARDIOGRAM TRACING: CPT | Performed by: STUDENT IN AN ORGANIZED HEALTH CARE EDUCATION/TRAINING PROGRAM

## 2024-05-13 PROCEDURE — 83735 ASSAY OF MAGNESIUM: CPT | Performed by: STUDENT IN AN ORGANIZED HEALTH CARE EDUCATION/TRAINING PROGRAM

## 2024-05-13 PROCEDURE — 99222 1ST HOSP IP/OBS MODERATE 55: CPT

## 2024-05-13 PROCEDURE — 84100 ASSAY OF PHOSPHORUS: CPT | Performed by: STUDENT IN AN ORGANIZED HEALTH CARE EDUCATION/TRAINING PROGRAM

## 2024-05-13 PROCEDURE — 36415 COLL VENOUS BLD VENIPUNCTURE: CPT | Performed by: INTERNAL MEDICINE

## 2024-05-13 PROCEDURE — 71275 CT ANGIOGRAPHY CHEST: CPT | Performed by: STUDENT IN AN ORGANIZED HEALTH CARE EDUCATION/TRAINING PROGRAM

## 2024-05-13 PROCEDURE — 85520 HEPARIN ASSAY: CPT | Performed by: INTERNAL MEDICINE

## 2024-05-13 PROCEDURE — 1200000002 HC GENERAL ROOM WITH TELEMETRY DAILY

## 2024-05-13 PROCEDURE — 85025 COMPLETE CBC W/AUTO DIFF WBC: CPT | Performed by: STUDENT IN AN ORGANIZED HEALTH CARE EDUCATION/TRAINING PROGRAM

## 2024-05-13 PROCEDURE — 1159F MED LIST DOCD IN RCRD: CPT | Performed by: STUDENT IN AN ORGANIZED HEALTH CARE EDUCATION/TRAINING PROGRAM

## 2024-05-13 PROCEDURE — 2550000001 HC RX 255 CONTRASTS: Performed by: EMERGENCY MEDICINE

## 2024-05-13 PROCEDURE — 93971 EXTREMITY STUDY: CPT

## 2024-05-13 PROCEDURE — 84075 ASSAY ALKALINE PHOSPHATASE: CPT | Performed by: STUDENT IN AN ORGANIZED HEALTH CARE EDUCATION/TRAINING PROGRAM

## 2024-05-13 PROCEDURE — 99214 OFFICE O/P EST MOD 30 MIN: CPT | Performed by: STUDENT IN AN ORGANIZED HEALTH CARE EDUCATION/TRAINING PROGRAM

## 2024-05-13 PROCEDURE — 71275 CT ANGIOGRAPHY CHEST: CPT

## 2024-05-13 PROCEDURE — 99285 EMERGENCY DEPT VISIT HI MDM: CPT | Mod: 25

## 2024-05-13 PROCEDURE — 96376 TX/PRO/DX INJ SAME DRUG ADON: CPT

## 2024-05-13 PROCEDURE — 93005 ELECTROCARDIOGRAM TRACING: CPT

## 2024-05-13 PROCEDURE — 1111F DSCHRG MED/CURRENT MED MERGE: CPT | Performed by: STUDENT IN AN ORGANIZED HEALTH CARE EDUCATION/TRAINING PROGRAM

## 2024-05-13 PROCEDURE — 93971 EXTREMITY STUDY: CPT | Performed by: SURGERY

## 2024-05-13 RX ORDER — IBUPROFEN 200 MG
1 TABLET ORAL DAILY
Status: DISCONTINUED | OUTPATIENT
Start: 2024-05-14 | End: 2024-05-15 | Stop reason: HOSPADM

## 2024-05-13 RX ORDER — ACETAMINOPHEN 325 MG/1
650 TABLET ORAL EVERY 4 HOURS PRN
Status: DISCONTINUED | OUTPATIENT
Start: 2024-05-13 | End: 2024-05-15 | Stop reason: HOSPADM

## 2024-05-13 RX ORDER — LAMOTRIGINE 25 MG/1
50 TABLET ORAL 2 TIMES DAILY
Status: DISCONTINUED | OUTPATIENT
Start: 2024-05-14 | End: 2024-05-15 | Stop reason: HOSPADM

## 2024-05-13 RX ORDER — HEPARIN SODIUM 10000 [USP'U]/100ML
0-4500 INJECTION, SOLUTION INTRAVENOUS CONTINUOUS
Status: DISCONTINUED | OUTPATIENT
Start: 2024-05-13 | End: 2024-05-15 | Stop reason: HOSPADM

## 2024-05-13 RX ORDER — LEVETIRACETAM 500 MG/1
500 TABLET ORAL 2 TIMES DAILY
Status: DISCONTINUED | OUTPATIENT
Start: 2024-05-14 | End: 2024-05-15 | Stop reason: HOSPADM

## 2024-05-13 RX ORDER — ACETAMINOPHEN 160 MG/5ML
650 SOLUTION ORAL EVERY 4 HOURS PRN
Status: DISCONTINUED | OUTPATIENT
Start: 2024-05-13 | End: 2024-05-15 | Stop reason: HOSPADM

## 2024-05-13 RX ADMIN — IOHEXOL 69 ML: 350 INJECTION, SOLUTION INTRAVENOUS at 20:45

## 2024-05-13 RX ADMIN — HEPARIN SODIUM 1400 UNITS/HR: 10000 INJECTION, SOLUTION INTRAVENOUS at 19:23

## 2024-05-13 SDOH — SOCIAL STABILITY: SOCIAL INSECURITY: HAS ANYONE EVER THREATENED TO HURT YOUR FAMILY OR YOUR PETS?: NO

## 2024-05-13 SDOH — SOCIAL STABILITY: SOCIAL INSECURITY: DO YOU FEEL UNSAFE GOING BACK TO THE PLACE WHERE YOU ARE LIVING?: NO

## 2024-05-13 SDOH — SOCIAL STABILITY: SOCIAL INSECURITY: DO YOU FEEL ANYONE HAS EXPLOITED OR TAKEN ADVANTAGE OF YOU FINANCIALLY OR OF YOUR PERSONAL PROPERTY?: NO

## 2024-05-13 SDOH — SOCIAL STABILITY: SOCIAL INSECURITY: ARE YOU OR HAVE YOU BEEN THREATENED OR ABUSED PHYSICALLY, EMOTIONALLY, OR SEXUALLY BY ANYONE?: NO

## 2024-05-13 SDOH — SOCIAL STABILITY: SOCIAL INSECURITY: ABUSE: ADULT

## 2024-05-13 SDOH — SOCIAL STABILITY: SOCIAL INSECURITY: DOES ANYONE TRY TO KEEP YOU FROM HAVING/CONTACTING OTHER FRIENDS OR DOING THINGS OUTSIDE YOUR HOME?: NO

## 2024-05-13 SDOH — SOCIAL STABILITY: SOCIAL INSECURITY: ARE THERE ANY APPARENT SIGNS OF INJURIES/BEHAVIORS THAT COULD BE RELATED TO ABUSE/NEGLECT?: NO

## 2024-05-13 SDOH — SOCIAL STABILITY: SOCIAL INSECURITY: HAVE YOU HAD THOUGHTS OF HARMING ANYONE ELSE?: NO

## 2024-05-13 SDOH — SOCIAL STABILITY: SOCIAL INSECURITY: WERE YOU ABLE TO COMPLETE ALL THE BEHAVIORAL HEALTH SCREENINGS?: YES

## 2024-05-13 SDOH — SOCIAL STABILITY: SOCIAL INSECURITY: HAVE YOU HAD ANY THOUGHTS OF HARMING ANYONE ELSE?: NO

## 2024-05-13 ASSESSMENT — ACTIVITIES OF DAILY LIVING (ADL)
BATHING: INDEPENDENT
LACK_OF_TRANSPORTATION: NO
HEARING - RIGHT EAR: FUNCTIONAL
GROOMING: INDEPENDENT
PATIENT'S MEMORY ADEQUATE TO SAFELY COMPLETE DAILY ACTIVITIES?: YES
HEARING - LEFT EAR: FUNCTIONAL
FEEDING YOURSELF: INDEPENDENT
TOILETING: INDEPENDENT
WALKS IN HOME: INDEPENDENT
ASSISTIVE_DEVICE: DENTURES UPPER;EYEGLASSES
JUDGMENT_ADEQUATE_SAFELY_COMPLETE_DAILY_ACTIVITIES: YES
DRESSING YOURSELF: INDEPENDENT
ADEQUATE_TO_COMPLETE_ADL: YES

## 2024-05-13 ASSESSMENT — LIFESTYLE VARIABLES
TOTAL SCORE: 0
HOW OFTEN DO YOU HAVE 6 OR MORE DRINKS ON ONE OCCASION: NEVER
AUDIT-C TOTAL SCORE: 0
HAVE PEOPLE ANNOYED YOU BY CRITICIZING YOUR DRINKING: NO
HAVE YOU EVER FELT YOU SHOULD CUT DOWN ON YOUR DRINKING: NO
AUDIT-C TOTAL SCORE: 0
SKIP TO QUESTIONS 9-10: 1
EVER FELT BAD OR GUILTY ABOUT YOUR DRINKING: NO
HOW MANY STANDARD DRINKS CONTAINING ALCOHOL DO YOU HAVE ON A TYPICAL DAY: PATIENT DOES NOT DRINK
EVER HAD A DRINK FIRST THING IN THE MORNING TO STEADY YOUR NERVES TO GET RID OF A HANGOVER: NO
HOW OFTEN DO YOU HAVE A DRINK CONTAINING ALCOHOL: NEVER

## 2024-05-13 ASSESSMENT — COGNITIVE AND FUNCTIONAL STATUS - GENERAL
PATIENT BASELINE BEDBOUND: NO
DAILY ACTIVITIY SCORE: 24
MOBILITY SCORE: 24

## 2024-05-13 ASSESSMENT — PATIENT HEALTH QUESTIONNAIRE - PHQ9
2. FEELING DOWN, DEPRESSED OR HOPELESS: NOT AT ALL
SUM OF ALL RESPONSES TO PHQ9 QUESTIONS 1 & 2: 0
1. LITTLE INTEREST OR PLEASURE IN DOING THINGS: NOT AT ALL

## 2024-05-13 ASSESSMENT — PAIN SCALES - GENERAL
PAINLEVEL_OUTOF10: 0 - NO PAIN
PAINLEVEL_OUTOF10: 1

## 2024-05-13 ASSESSMENT — ENCOUNTER SYMPTOMS
CONFUSION: 0
FEVER: 0
DIZZINESS: 0
SHORTNESS OF BREATH: 0

## 2024-05-13 ASSESSMENT — PAIN DESCRIPTION - LOCATION: LOCATION: ARM

## 2024-05-13 ASSESSMENT — PAIN DESCRIPTION - ORIENTATION: ORIENTATION: LEFT

## 2024-05-13 ASSESSMENT — PAIN - FUNCTIONAL ASSESSMENT
PAIN_FUNCTIONAL_ASSESSMENT: 0-10
PAIN_FUNCTIONAL_ASSESSMENT: 0-10

## 2024-05-13 ASSESSMENT — PAIN DESCRIPTION - PAIN TYPE: TYPE: ACUTE PAIN

## 2024-05-13 NOTE — PROGRESS NOTES
Chief Complaint:   No chief complaint on file.     History Of Present Illness:    Deonte Leavitt is a 69 y.o. male with complete heart block s/p PPM ([facility] - 04/25/2024) followed by RV lead malfunction requiring RV lead removal and implant of new RV lead (05/03/2024). Patient also has a history of seizures on Keppra.    After discharged from his procedure on 05/03/2024 (see above), patient developed left arm edema. Patient states that his edema has been improving over the last couple of days. This morning, patient had an upper extremity venous duplex, which was positive for acute DVT involving his left Subclavian, Axillary, Brachial, Cephalic & Basilic veins.     Last Recorded Vitals:  Vitals:    05/13/24 0837   BP: 144/73   Pulse: 72   SpO2: 98%   Weight: 80.3 kg (177 lb 0.5 oz)       Past Medical History:  He has a past medical history of Seizures (Multi).    Past Surgical History:  He has a past surgical history that includes Cardiac electrophysiology procedure (N/A, 4/24/2024) and Cardiac electrophysiology procedure (N/A, 4/25/2024).      Social History:  He reports that he has been smoking cigarettes. He has never used smokeless tobacco. He reports that he does not currently use alcohol.  Drug: Marijuana.    Family History:  No family history on file.     Allergies:  Patient has no known allergies.    Outpatient Medications:  Current Outpatient Medications   Medication Instructions    lamoTRIgine (LAMICTAL) 50 mg, oral, 2 times daily, Per pt, cut his dose down without speaking with provider    levETIRAcetam (KEPPRA) 500 mg, oral, 2 times daily       Review of Systems   Constitutional:  Negative for fever.   Respiratory:  Negative for shortness of breath.    Cardiovascular:         As per history.   Neurological:  Negative for dizziness and syncope.   Psychiatric/Behavioral:  Negative for confusion.       Physical Exam  Constitutional:       Appearance: Normal appearance.   Cardiovascular:      Rate and Rhythm:  "Normal rate and regular rhythm.      Heart sounds: No murmur heard.     No friction rub. No gallop.   Pulmonary:      Effort: Pulmonary effort is normal.      Breath sounds: Normal breath sounds.   Abdominal:      Palpations: Abdomen is soft.   Musculoskeletal:         General: Swelling (Left arm) present.      Cervical back: Neck supple.   Neurological:      Mental Status: He is alert.   Psychiatric:         Mood and Affect: Mood normal.         Behavior: Behavior normal.           Last Labs:  CBC -  Lab Results   Component Value Date    WBC 8.0 05/03/2024    HGB 13.9 05/03/2024    HCT 42.3 05/03/2024    MCV 92 05/03/2024     05/03/2024       CMP -  Lab Results   Component Value Date    CALCIUM 9.5 05/03/2024    PHOS 3.5 05/02/2024    PROT 6.7 05/03/2024    ALBUMIN 4.0 05/03/2024    AST 20 05/03/2024    ALT 33 05/03/2024    ALKPHOS 97 05/03/2024    BILITOT 0.5 05/03/2024       LIPID PANEL -   Lab Results   Component Value Date    CHOL 159 05/31/2022    TRIG 110 05/31/2022    HDL 66 05/31/2022    CHHDL 2.4 05/31/2022       RENAL FUNCTION PANEL -   Lab Results   Component Value Date    GLUCOSE 88 05/03/2024     05/03/2024    K 4.4 05/03/2024    CL 98 05/03/2024    CO2 31 05/03/2024    ANIONGAP 13 05/03/2024    BUN 14 05/03/2024    CREATININE 1.00 05/03/2024    CALCIUM 9.5 05/03/2024    PHOS 3.5 05/02/2024    ALBUMIN 4.0 05/03/2024        No results found for: \"BNP\", \"HGBA1C\"    Vascular US upper ext (05/13/2024):    CONCLUSIONS:  Right Upper Venous: The subclavian vein demonstrates a normal spontaneous and phasic flow.  Left Upper Venous: There is acute occlusive deep vein thrombosis visualized in the axillary, acute occlusive deep vein thrombosis visualized in the proximal subclavian, acute occlusive deep vein thrombosis visualized in the mid subclavian, acute occlusive deep vein thrombosis visualized in the distal subclavian and acute occlusive superficial venous thrombosis visualized in the basilic " veins. There is acute non-occlusive deep vein thrombosis visualized in the brachial, acute non-occlusive superficial venous thrombosis visualized in the proximal cephalic and acute non-occlusive superficial venous thrombosis visualized in the mid cephalic veins. Acute non-occlusive thrombus noted in left cephalic at antecubital fossa.     Echo 04/24/2024     CONCLUSIONS:   1. Left ventricular systolic function is normal with a 55-60% estimated ejection fraction.   2. Abnormal septal motion consistent with RV pacemaker.   3. Aortic valve sclerosis.    Diagnostic review: I have personally reviewed the result(s)     Assessment/Plan   Diagnoses and all orders for this visit:  Acute deep vein thrombosis (DVT) of left upper extremity after procedure (Multi)  -     rivaroxaban (Xarelto) 15 mg tablet; Take 1 tablet (15 mg) by mouth 2 times a day with meals for 21 days. Take with food.  -     rivaroxaban (Xarelto) 20 mg tablet; Take 1 tablet (20 mg) by mouth once daily in the evening. Take with meals. Take with food.  Pacemaker  -     ECG 12 lead (Clinic Performed)  -     rivaroxaban (Xarelto) 15 mg tablet; Take 1 tablet (15 mg) by mouth 2 times a day with meals for 21 days. Take with food.  -     rivaroxaban (Xarelto) 20 mg tablet; Take 1 tablet (20 mg) by mouth once daily in the evening. Take with meals. Take with food.    Patient is here today with DVT post RV lead removal and reimplant on 05/03/2024. He had an upper extremity venous duplex which was positive for acute DVT involving his left Subclavian, Axillary, Brachial, Cephalic & Basilic veins. Patient reports that his left arm was more swollen and is progressively getting better, less swollen. Will start Xarelto 15mg bid for 21 days followed by Xarelto 20mg daily. I advised the patient to go to the ER if swelling is becoming worse or if he has problems with bleeding. Will schedule a follow up.      Sandoval Geronimo MD

## 2024-05-14 ENCOUNTER — APPOINTMENT (OUTPATIENT)
Dept: CARDIOLOGY | Facility: HOSPITAL | Age: 69
End: 2024-05-14

## 2024-05-14 LAB
ALBUMIN SERPL BCP-MCNC: 3.7 G/DL (ref 3.4–5)
ANION GAP SERPL CALC-SCNC: 11 MMOL/L (ref 10–20)
BUN SERPL-MCNC: 18 MG/DL (ref 6–23)
CALCIUM SERPL-MCNC: 9.2 MG/DL (ref 8.6–10.3)
CHLORIDE SERPL-SCNC: 102 MMOL/L (ref 98–107)
CO2 SERPL-SCNC: 29 MMOL/L (ref 21–32)
CREAT SERPL-MCNC: 0.95 MG/DL (ref 0.5–1.3)
EGFRCR SERPLBLD CKD-EPI 2021: 88 ML/MIN/1.73M*2
ERYTHROCYTE [DISTWIDTH] IN BLOOD BY AUTOMATED COUNT: 12.8 % (ref 11.5–14.5)
GLUCOSE SERPL-MCNC: 86 MG/DL (ref 74–99)
HCT VFR BLD AUTO: 40.6 % (ref 41–52)
HGB BLD-MCNC: 13 G/DL (ref 13.5–17.5)
MCH RBC QN AUTO: 30.2 PG (ref 26–34)
MCHC RBC AUTO-ENTMCNC: 32 G/DL (ref 32–36)
MCV RBC AUTO: 94 FL (ref 80–100)
NRBC BLD-RTO: 0 /100 WBCS (ref 0–0)
PHOSPHATE SERPL-MCNC: 4.8 MG/DL (ref 2.5–4.9)
PLATELET # BLD AUTO: 459 X10*3/UL (ref 150–450)
POTASSIUM SERPL-SCNC: 4.3 MMOL/L (ref 3.5–5.3)
RBC # BLD AUTO: 4.3 X10*6/UL (ref 4.5–5.9)
SODIUM SERPL-SCNC: 138 MMOL/L (ref 136–145)
UFH PPP CHRO-ACNC: 0.4 IU/ML
UFH PPP CHRO-ACNC: 0.5 IU/ML
WBC # BLD AUTO: 7.4 X10*3/UL (ref 4.4–11.3)

## 2024-05-14 PROCEDURE — G0378 HOSPITAL OBSERVATION PER HR: HCPCS

## 2024-05-14 PROCEDURE — 99222 1ST HOSP IP/OBS MODERATE 55: CPT | Performed by: PHYSICIAN ASSISTANT

## 2024-05-14 PROCEDURE — RXMED WILLOW AMBULATORY MEDICATION CHARGE

## 2024-05-14 PROCEDURE — 85520 HEPARIN ASSAY: CPT | Performed by: INTERNAL MEDICINE

## 2024-05-14 PROCEDURE — 85027 COMPLETE CBC AUTOMATED: CPT

## 2024-05-14 PROCEDURE — 96376 TX/PRO/DX INJ SAME DRUG ADON: CPT

## 2024-05-14 PROCEDURE — 80069 RENAL FUNCTION PANEL: CPT

## 2024-05-14 PROCEDURE — 36415 COLL VENOUS BLD VENIPUNCTURE: CPT

## 2024-05-14 PROCEDURE — 99233 SBSQ HOSP IP/OBS HIGH 50: CPT | Performed by: STUDENT IN AN ORGANIZED HEALTH CARE EDUCATION/TRAINING PROGRAM

## 2024-05-14 PROCEDURE — 2500000001 HC RX 250 WO HCPCS SELF ADMINISTERED DRUGS (ALT 637 FOR MEDICARE OP)

## 2024-05-14 PROCEDURE — 2500000004 HC RX 250 GENERAL PHARMACY W/ HCPCS (ALT 636 FOR OP/ED): Performed by: STUDENT IN AN ORGANIZED HEALTH CARE EDUCATION/TRAINING PROGRAM

## 2024-05-14 RX ADMIN — HEPARIN SODIUM 1400 UNITS/HR: 10000 INJECTION, SOLUTION INTRAVENOUS at 08:29

## 2024-05-14 RX ADMIN — LAMOTRIGINE 50 MG: 25 TABLET ORAL at 16:30

## 2024-05-14 RX ADMIN — LAMOTRIGINE 50 MG: 25 TABLET ORAL at 04:56

## 2024-05-14 RX ADMIN — LEVETIRACETAM 500 MG: 500 TABLET, FILM COATED ORAL at 04:56

## 2024-05-14 RX ADMIN — LEVETIRACETAM 500 MG: 500 TABLET, FILM COATED ORAL at 16:30

## 2024-05-14 SDOH — SOCIAL STABILITY: SOCIAL NETWORK: COMMUNITY RESOURCES: OTHER (COMMENT)

## 2024-05-14 SDOH — ECONOMIC STABILITY: GENERAL: FINANCIAL RESOURCES: MEDICAID

## 2024-05-14 SDOH — HEALTH STABILITY: MENTAL HEALTH: BEHAVIOR: ORIENTED

## 2024-05-14 SDOH — SOCIAL STABILITY: SOCIAL INSECURITY: FEELS SAFE LIVING IN HOME: YES

## 2024-05-14 SDOH — HEALTH STABILITY: MENTAL HEALTH

## 2024-05-14 ASSESSMENT — ENCOUNTER SYMPTOMS
SEIZURES: 0
WEAKNESS: 0
CONSTIPATION: 0
COLOR CHANGE: 0
NECK PAIN: 0
VOMITING: 0
CHILLS: 0
FACIAL ASYMMETRY: 0
LIGHT-HEADEDNESS: 0
PALPITATIONS: 0
WOUND: 0
FATIGUE: 0
FEVER: 0
JOINT SWELLING: 0
NAUSEA: 0
ADENOPATHY: 0
COUGH: 0
SLEEP DISTURBANCE: 0
DIZZINESS: 0
DIFFICULTY URINATING: 0
SORE THROAT: 0
HEADACHES: 0
ARTHRALGIAS: 0
TROUBLE SWALLOWING: 0
SPEECH DIFFICULTY: 0
WHEEZING: 0
DIARRHEA: 0
SHORTNESS OF BREATH: 0
CONFUSION: 0
NUMBNESS: 0
ABDOMINAL PAIN: 0

## 2024-05-14 ASSESSMENT — COGNITIVE AND FUNCTIONAL STATUS - GENERAL
MOBILITY SCORE: 24
DAILY ACTIVITIY SCORE: 24

## 2024-05-14 ASSESSMENT — ACTIVITIES OF DAILY LIVING (ADL)
LACK_OF_TRANSPORTATION: NO
BATHING: NO ASSISTANCE

## 2024-05-14 ASSESSMENT — PAIN SCALES - GENERAL
PAINLEVEL_OUTOF10: 0 - NO PAIN
PAINLEVEL_OUTOF10: 0 - NO PAIN

## 2024-05-14 NOTE — CONSULTS
Inpatient consult to Cardiology  Consult performed by: Abby Aguila PA-C  Consult ordered by: Fausto Post MD  Reason for consult: LUE DVT          Reason For Consult  LUE DVT    History Of Present Illness  Deonte Leavitt is a 69 y.o. male with past medical history of seizures, tobacco use and complete heart block s/p PPM  ([facility] - 04/25/2024) followed by RV lead malfunction requiring RV lead removal and implant of new RV lead (05/03/2024). Was seen outpatient EP clinic with Dr Duque yesterday for routine follow up. Complaining of LUE swelling, outpatient venous duplex of the LUE yesterday with acute occlusive DVT axillary, subclavian and acute non occlusive DVT in the brachial vein. Acute occlusive superficial thrombus in basilic vein and acute non occlusive superficial thrombus in cephalic vein. He was given rx for xarelto and told to return to ER with worsening symptoms. Patient only presented to ER last evening due to medication cost, xarelto was too expensive. He was admitted and started on heparin drip. CTA chest was negative for PE. Vascular surgery Dr Hernandez was consulted for possible intervention. Pt seen and evaluated at beside. Resting comfortably. Patient reports his LUE swelling has been progressively been getting better over the past few days prior to his outpatient appointment with Dr Duque. He reports since being admitted on the heparin drip the swelling continues to improve. Denies numbness, tingling or weakness of the LUE. No shortness of breath or difficulty breathing.       Past Medical History  He has a past medical history of Seizures (Multi).  Complete heart block s/p PPM ([facility] - 04/25/2024) followed by RV lead malfunction requiring RV lead removal and implant of new RV lead (05/03/2024).     Surgical History  He has a past surgical history that includes Cardiac electrophysiology procedure (N/A, 4/24/2024) and Cardiac electrophysiology procedure (N/A, 4/25/2024).     Social  History  He reports that he has been smoking cigarettes. He has never used smokeless tobacco. He reports that he does not currently use alcohol.  Drug: Marijuana.    Family History  No family history on file.     Allergies  Patient has no known allergies.    Review of Systems   Constitutional:  Negative for chills, fatigue and fever.   HENT:  Negative for congestion, sore throat and trouble swallowing.    Eyes:  Negative for visual disturbance.   Respiratory:  Negative for cough, shortness of breath and wheezing.    Cardiovascular:  Negative for chest pain, palpitations and leg swelling.        LUE swelling   Gastrointestinal:  Negative for abdominal pain, constipation, diarrhea, nausea and vomiting.   Endocrine: Negative for cold intolerance and heat intolerance.   Genitourinary:  Negative for difficulty urinating.   Musculoskeletal:  Negative for arthralgias, joint swelling and neck pain.   Skin:  Negative for color change and wound.   Neurological:  Negative for dizziness, seizures, syncope, facial asymmetry, speech difficulty, weakness, light-headedness, numbness and headaches.   Hematological:  Negative for adenopathy.   Psychiatric/Behavioral:  Negative for behavioral problems, confusion and sleep disturbance.         Physical Exam  Constitutional:       Appearance: Normal appearance.   HENT:      Head: Normocephalic.      Right Ear: External ear normal.      Left Ear: External ear normal.      Nose: Nose normal.      Mouth/Throat:      Mouth: Mucous membranes are moist.   Eyes:      Extraocular Movements: Extraocular movements intact.   Neck:      Vascular: No carotid bruit.   Cardiovascular:      Rate and Rhythm: Normal rate and regular rhythm.      Pulses: Normal pulses.   Pulmonary:      Effort: Pulmonary effort is normal. No respiratory distress.      Breath sounds: Normal breath sounds.   Chest:      Comments: Left chest incision with dermabond, incision is c/d/I, soft surrounding  Abdominal:       "Palpations: Abdomen is soft.      Tenderness: There is no abdominal tenderness.   Musculoskeletal:         General: Swelling present. No tenderness.      Cervical back: Normal range of motion and neck supple.      Right lower leg: No edema.      Left lower leg: No edema.      Comments: LUE swelling, motor and sensory intact, palpable radial pulse.    Skin:     General: Skin is warm and dry.      Capillary Refill: Capillary refill takes less than 2 seconds.      Findings: No lesion.   Neurological:      General: No focal deficit present.      Mental Status: He is alert and oriented to person, place, and time. Mental status is at baseline.   Psychiatric:         Mood and Affect: Mood normal.         Behavior: Behavior normal.         Thought Content: Thought content normal.         Judgment: Judgment normal.        Scheduled medications  lamoTRIgine, 50 mg, oral, BID  levETIRAcetam, 500 mg, oral, BID  nicotine, 1 patch, transdermal, Daily      Continuous medications  heparin, 0-4,500 Units/hr, Last Rate: 1,400 Units/hr (05/14/24 0829)      PRN medications  PRN medications: acetaminophen **OR** acetaminophen, heparin   Last Recorded Vitals  Blood pressure 143/86, pulse 66, temperature 36.2 °C (97.2 °F), temperature source Temporal, resp. rate 20, height 1.702 m (5' 7\"), weight 80 kg (176 lb 5.9 oz), SpO2 93%.    Relevant Results  5/13/24  UE venous duplex  CONCLUSIONS:  Right Upper Venous: The subclavian vein demonstrates a normal spontaneous and phasic flow.  Left Upper Venous: There is acute occlusive deep vein thrombosis visualized in the axillary, acute occlusive deep vein thrombosis visualized in the proximal subclavian, acute occlusive deep vein thrombosis visualized in the mid subclavian, acute occlusive deep vein thrombosis visualized in the distal subclavian and acute occlusive superficial venous thrombosis visualized in the basilic veins. There is acute non-occlusive deep vein thrombosis visualized in the " brachial, acute non-occlusive superficial venous thrombosis visualized in the proximal cephalic and acute non-occlusive superficial venous thrombosis visualized in the mid cephalic veins. Acute non-occlusive thrombus noted in left cephalic at antecubital fossa.     Imaging & Doppler Findings:     Right             Flow  Subclavian Spontaneous/Phasic        Left                Compress      Thrombus  Internal Jugular      Yes           None  Subclavian Proximal    No      Acute occlusive  Subclavian Mid         No      Acute occlusive  Subclavian Distal      No      Acute occlusive  Axillary               No      Acute occlusive  Brachial               No    Acute non-occlusive  Basilic                No      Acute occlusive    5/13/24 CTA Chest  IMPRESSION:  1. No evidence of acute pulmonary embolism.  2. Few prominent calcified and noncalcified mediastinal and bilateral  hilar lymph nodes with a calcified subpleural pulmonary nodule,  likely favored to be related to chronic granulomatous disease.  3. Left chest wall pacemaker in place.  4. Mild atherosclerotic calcifications of the arch of aorta.   Assessment/Plan     69 year old male with past medical history of seizures, tobacco use and complete heart block s/p PPM  ([facility] - 04/25/2024) followed by RV lead malfunction requiring RV lead removal and implant of new RV lead (05/03/2024) found to have LUE DVT outpatient appointment yesterday. Patient returned to ER yesterday due to cost of xarelto. Currently on Heparin drip.   LUE symptoms are improving. Motor and sensory intact. CTA chest negative for PE. No indication for vascular intervention. OK to resume regular diet. Continue anticoagulation, OK to transition to oral agent, recommend discussing with pharmacy to find most cost effective therapy for patient. Patient can follow up on discharge with Dr Duque as scheduled. Discussed above course of care and treatment plan with Dr Hernandez who is in agreement.  Discussed with primary medical team.

## 2024-05-14 NOTE — PROGRESS NOTES
05/14/24 0725   Discharge Planning   Living Arrangements Spouse/significant other   Support Systems Spouse/significant other   Assistance Needed A&OX3; independent with ADLs with no DME; drives; room air baseline and currently room air   Type of Residence Private residence   Number of Stairs to Enter Residence 1   Number of Stairs Within Residence 1   Do you have animals or pets at home? No   Type of Animals or Pets patient stated no pets at home   Who is requesting discharge planning? Provider   Patient expects to be discharged to: Patient with no insurance (NO MEDICARE). Will notify SW of no insurance. Patient will likely need new anticoagulation upon dc.   Financial Resource Strain   How hard is it for you to pay for the very basics like food, housing, medical care, and heating? Not hard   Housing Stability   In the last 12 months, was there a time when you were not able to pay the mortgage or rent on time? N   In the last 12 months, how many places have you lived? 1   In the last 12 months, was there a time when you did not have a steady place to sleep or slept in a shelter (including now)? N   Transportation Needs   In the past 12 months, has lack of transportation kept you from medical appointments or from getting medications? no   In the past 12 months, has lack of transportation kept you from meetings, work, or from getting things needed for daily living? No

## 2024-05-14 NOTE — PROGRESS NOTES
"Deonte Leavitt is a 69 y.o. male on day 1 of admission presenting with Acute deep vein thrombosis (DVT) of axillary vein of left upper extremity (Multi).    Subjective   Pt denies pain. Denies new complaints.        Objective     Physical Exam  Vitals and nursing note reviewed.   Constitutional:       General: He is not in acute distress.     Appearance: Normal appearance. He is not ill-appearing or toxic-appearing.   HENT:      Head: Normocephalic and atraumatic.      Nose: Nose normal.      Mouth/Throat:      Mouth: Mucous membranes are moist.   Eyes:      Extraocular Movements: Extraocular movements intact.      Conjunctiva/sclera: Conjunctivae normal.      Pupils: Pupils are equal, round, and reactive to light.   Cardiovascular:      Rate and Rhythm: Normal rate and regular rhythm.      Heart sounds: No murmur heard.     No gallop.   Pulmonary:      Effort: Pulmonary effort is normal. No respiratory distress.      Breath sounds: Normal breath sounds. No wheezing, rhonchi or rales.   Abdominal:      General: Abdomen is flat. Bowel sounds are normal. There is no distension.      Palpations: Abdomen is soft. There is no mass.      Tenderness: There is no abdominal tenderness.   Musculoskeletal:         General: Swelling present. No tenderness. Normal range of motion.      Cervical back: Normal range of motion and neck supple.   Skin:     General: Skin is warm and dry.   Neurological:      General: No focal deficit present.      Mental Status: He is alert and oriented to person, place, and time. Mental status is at baseline.   Psychiatric:         Mood and Affect: Mood normal.         Behavior: Behavior normal.         Thought Content: Thought content normal.         Judgment: Judgment normal.         Last Recorded Vitals:  /86 (BP Location: Right arm, Patient Position: Lying)   Pulse 66   Temp 36.2 °C (97.2 °F) (Temporal)   Resp 20   Ht 1.702 m (5' 7\")   Wt 80 kg (176 lb 5.9 oz)   SpO2 93%   BMI 27.62 " kg/m²      Scheduled medications:  lamoTRIgine, 50 mg, oral, BID  levETIRAcetam, 500 mg, oral, BID  nicotine, 1 patch, transdermal, Daily      Continuous medications:  heparin, 0-4,500 Units/hr, Last Rate: 1,400 Units/hr (05/14/24 0829)      PRN medications:  PRN medications: acetaminophen **OR** acetaminophen, heparin     Relevant Results:  Results for orders placed or performed during the hospital encounter of 05/13/24 (from the past 24 hour(s))   CBC and Auto Differential   Result Value Ref Range    WBC 7.9 4.4 - 11.3 x10*3/uL    nRBC 0.0 0.0 - 0.0 /100 WBCs    RBC 4.53 4.50 - 5.90 x10*6/uL    Hemoglobin 13.7 13.5 - 17.5 g/dL    Hematocrit 42.4 41.0 - 52.0 %    MCV 94 80 - 100 fL    MCH 30.2 26.0 - 34.0 pg    MCHC 32.3 32.0 - 36.0 g/dL    RDW 12.7 11.5 - 14.5 %    Platelets 525 (H) 150 - 450 x10*3/uL    Neutrophils % 54.1 40.0 - 80.0 %    Immature Granulocytes %, Automated 0.5 0.0 - 0.9 %    Lymphocytes % 31.2 13.0 - 44.0 %    Monocytes % 11.6 2.0 - 10.0 %    Eosinophils % 2.0 0.0 - 6.0 %    Basophils % 0.6 0.0 - 2.0 %    Neutrophils Absolute 4.24 1.20 - 7.70 x10*3/uL    Immature Granulocytes Absolute, Automated 0.04 0.00 - 0.70 x10*3/uL    Lymphocytes Absolute 2.45 1.20 - 4.80 x10*3/uL    Monocytes Absolute 0.91 0.10 - 1.00 x10*3/uL    Eosinophils Absolute 0.16 0.00 - 0.70 x10*3/uL    Basophils Absolute 0.05 0.00 - 0.10 x10*3/uL   Comprehensive metabolic panel   Result Value Ref Range    Glucose 80 74 - 99 mg/dL    Sodium 140 136 - 145 mmol/L    Potassium 4.5 3.5 - 5.3 mmol/L    Chloride 102 98 - 107 mmol/L    Bicarbonate 29 21 - 32 mmol/L    Anion Gap 14 10 - 20 mmol/L    Urea Nitrogen 17 6 - 23 mg/dL    Creatinine 1.00 0.50 - 1.30 mg/dL    eGFR 82 >60 mL/min/1.73m*2    Calcium 9.5 8.6 - 10.3 mg/dL    Albumin 4.0 3.4 - 5.0 g/dL    Alkaline Phosphatase 127 33 - 136 U/L    Total Protein 7.3 6.4 - 8.2 g/dL    AST 32 9 - 39 U/L    Bilirubin, Total 0.3 0.0 - 1.2 mg/dL    ALT 75 (H) 10 - 52 U/L   Magnesium   Result  Value Ref Range    Magnesium 2.00 1.60 - 2.40 mg/dL   Phosphorus   Result Value Ref Range    Phosphorus 3.9 2.5 - 4.9 mg/dL   ECG 12 lead   Result Value Ref Range    Ventricular Rate 73 BPM    Atrial Rate 73 BPM    ME Interval 236 ms    QRS Duration 166 ms    QT Interval 424 ms    QTC Calculation(Bazett) 467 ms    P Axis 74 degrees    R Axis -89 degrees    T Axis 51 degrees    QRS Count 12 beats    Q Onset 227 ms    P Onset 109 ms    P Offset 170 ms    T Offset 439 ms    QTC Fredericia 452 ms   Heparin Assay   Result Value Ref Range    Heparin Unfractionated 0.5 See Comment Below for Therapeutic Ranges IU/mL   CBC   Result Value Ref Range    WBC 7.4 4.4 - 11.3 x10*3/uL    nRBC 0.0 0.0 - 0.0 /100 WBCs    RBC 4.30 (L) 4.50 - 5.90 x10*6/uL    Hemoglobin 13.0 (L) 13.5 - 17.5 g/dL    Hematocrit 40.6 (L) 41.0 - 52.0 %    MCV 94 80 - 100 fL    MCH 30.2 26.0 - 34.0 pg    MCHC 32.0 32.0 - 36.0 g/dL    RDW 12.8 11.5 - 14.5 %    Platelets 459 (H) 150 - 450 x10*3/uL   Renal Function Panel   Result Value Ref Range    Glucose 86 74 - 99 mg/dL    Sodium 138 136 - 145 mmol/L    Potassium 4.3 3.5 - 5.3 mmol/L    Chloride 102 98 - 107 mmol/L    Bicarbonate 29 21 - 32 mmol/L    Anion Gap 11 10 - 20 mmol/L    Urea Nitrogen 18 6 - 23 mg/dL    Creatinine 0.95 0.50 - 1.30 mg/dL    eGFR 88 >60 mL/min/1.73m*2    Calcium 9.2 8.6 - 10.3 mg/dL    Phosphorus 4.8 2.5 - 4.9 mg/dL    Albumin 3.7 3.4 - 5.0 g/dL   Heparin Assay   Result Value Ref Range    Heparin Unfractionated 0.4 See Comment Below for Therapeutic Ranges IU/mL       ECG 12 lead    Result Date: 5/14/2024  Sinus rhythm with 1st degree AV block Left axis deviation Right bundle branch block Septal infarct , age undetermined Abnormal ECG When compared with ECG of 13-MAY-2024 08:40, (unconfirmed) Septal infarct is now Present    CT angio chest for pulmonary embolism    Result Date: 5/13/2024  Interpreted By:  Paz Garcia, STUDY: CT ANGIO CHEST FOR PULMONARY EMBOLISM;  5/13/2024 8:43  pm   INDICATION: Signs/Symptoms:dvt in LUE, Tachpnea.   COMPARISON: None   ACCESSION NUMBER(S): [id number]   ORDERING CLINICIAN: DARRION FALLON   TECHNIQUE: Helical data acquisition of the chest was obtained after intravenous administration of 69 mL of 350 mg iohexol, as per PE protocol. Images were reformatted in coronal and sagittal planes. Axial and coronal maximum intensity projection (MIP) images were created and reviewed.   FINDINGS: POTENTIAL LIMITATIONS OF THE STUDY: Evaluation of distal subsegmental pulmonary arteries is slightly limited due to motion artifacts.   HEART AND VESSELS: There are no discrete filling defects within main pulmonary artery and its branches to suggest acute pulmonary embolism. Main pulmonary artery and its branches are normal in caliber.   The thoracic aorta normal in course and caliber.Mild atherosclerotic calcifications of the arch of aorta are noted. No coronary artery calcifications are seen. Please note, the study is not optimized for evaluation of coronary arteries.   The cardiac chambers are not enlarged.There is left chest wall pacemaker with leads extending to the heart.   There is no pericardial effusion seen.   MEDIASTINUM AND JUAN, LOWER NECK AND AXILLA: The visualized thyroid gland is within normal limits. There are few prominent mediastinal lymph nodes, for example the prominent right paratracheal lymph node measures up to 1 cm in short axis and prominent lymph node in the AP window measures up to 8 mm in short axis. There are also calcified subcarinal and right infrahilar lymph nodes. There are also few mildly enlarged left axillary lymph nodes with prominent lymph node measuring up to 1 cm in short axis. Esophagus appears within normal limits as seen.   LUNGS AND AIRWAYS: The trachea and central airways are patent. No endobronchial lesion is seen.   Bilateral lungs demonstrate mild-to-moderate emphysematous changes. No evidence of consolidative airspace opacities.  There is a calcified subpleural pulmonary nodule in the right lower lobe likely favored to represent a granuloma. No pleural effusions or pneumothorax.     UPPER ABDOMEN: The visualized subdiaphragmatic structures demonstrate no remarkable findings.       CHEST WALL AND OSSEOUS STRUCTURES: Chest wall is within normal limits. No acute osseous pathology.There are no suspicious osseous lesions.       1. No evidence of acute pulmonary embolism. 2. Few prominent calcified and noncalcified mediastinal and bilateral hilar lymph nodes with a calcified subpleural pulmonary nodule, likely favored to be related to chronic granulomatous disease. 3. Left chest wall pacemaker in place. 4. Mild atherosclerotic calcifications of the arch of aorta.     MACRO: None   Signed by: Paz Garcia 5/13/2024 10:05 PM Dictation workstation:   [id number]    ECG 12 lead (Clinic Performed)    Result Date: 5/13/2024  Sinus rhythm with 1st degree AV block Right bundle branch block Abnormal ECG When compared with ECG of 02-MAY-2024 13:31, Previous ECG has undetermined rhythm, needs review    Vascular US upper extremity venous duplex left    Result Date: 5/13/2024          [place] [address]  Tel [phone] and Fax [phone]  [facility] LEFT  Patient Name:     AYDEE RAO        Reading           [id number] Ross Singh                                       Physician:        MD Study Date:       5/13/2024           Ordering          [id number] MICHAEL GALAVIZ                                       Physician:        CANDY MRN/PID:          [id number]            Technologist:     Esther Maldonado [facility] Accession#:       [id number]        Technologist 2: Date of           1955 / 69      Encounter#:       [id number] Birth/Age:        years Gender:           M Admission Status: Outpatient          Location          [facility]                                        Performed:  Diagnosis/ICD: Other specified soft tissue disorders-M79.89 CPT Codes:     93544 Peripheral venous duplex scan for DVT Limited  **CRITICAL RESULT** Critical Result: Acute DVT Notification called to Dr. Sandoval Jennings at 8:39:30 AM by Esther Maldonado.  CONCLUSIONS: Right Upper Venous: The subclavian vein demonstrates a normal spontaneous and phasic flow. Left Upper Venous: There is acute occlusive deep vein thrombosis visualized in the axillary, acute occlusive deep vein thrombosis visualized in the proximal subclavian, acute occlusive deep vein thrombosis visualized in the mid subclavian, acute occlusive deep vein thrombosis visualized in the distal subclavian and acute occlusive superficial venous thrombosis visualized in the basilic veins. There is acute non-occlusive deep vein thrombosis visualized in the brachial, acute non-occlusive superficial venous thrombosis visualized in the proximal cephalic and acute non-occlusive superficial venous thrombosis visualized in the mid cephalic veins. Acute non-occlusive thrombus noted in left cephalic at antecubital fossa.  Imaging & Doppler Findings:  Right             Flow Subclavian Spontaneous/Phasic  Left                Compress      Thrombus Internal Jugular      Yes           None Subclavian Proximal    No      Acute occlusive Subclavian Mid         No      Acute occlusive Subclavian Distal      No      Acute occlusive Axillary               No      Acute occlusive Brachial               No    Acute non-occlusive Basilic                No      Acute occlusive  [id number] Ross Singh MD Electronically signed by [id number] Ross Singh MD on 5/13/2024 at 10:54:06 AM  ** Final **            Assessment/Plan   Principal Problem:    Acute deep vein thrombosis (DVT) of axillary vein of left upper extremity (Multi)    Acute DVT of left upper extremity  - pt can't afford xarelto  - hep gtt  - Dr Mary santizo  -  lamotrigine    Tobacco abuse  - nicotine patch    Diet: regular  Dispo: monitor clinically          Lima Valdez MD  Hospitalist

## 2024-05-14 NOTE — NURSING NOTE
"1420 contacting physician-patient had blood in both stool and urine; waiting for orders    1457 physicians ask just to monitor/report if blood worsens or instead of bright red the stool becomes black. Discussed plan that patient will receive eliquis (1st dose) then iv hep will be dc'ed    1642 patient reporting sudden left arm pain and abdominal pain. \"Not severe but noticeable.\" Denies desire for pain meds. Physicians secure texted, should we order scans to rule out further clotting or gi bleed based on stool/urinal bleeding earlier. No concern from drs at this time. Will continue to monitor.  "

## 2024-05-14 NOTE — H&P
Patient: Deonte Leavitt   Age: 69 y.o.   Gender: male   Room/Bed: AC03/AC03     Attending: Sejal Lambert MD   Code Status:  Full Code    Chief Complaint:  Patient: Deonte Leavitt is a 69 y.o. male who presented to the hospital for acute DVT involving the Left upper extremity.     History of Presenting Illness    Deonte Leavitt, a 70 y/o M with PMHx of seizure disorder, tobacco use, and complete heart block s/p PPM ([facility] - 04/25/2024) followed by RV lead malfunction requiring RV lead removal and implant of new RV lead (05/03/2024) presented with left upper extremity swelling that was noticed when the patient was discharged last week after new RV lead implant placement at [facility].  Patient was supposed to follow-up with EP cardiology and was seen by Dr. Geornimo today when an ultrasound of the left upper extremity was completed which showed acute DVT involving his left subclavian, axillary, brachial, cephalic and basilic veins.  Dr. Geronimo prescribed the patient Xarelto for acute DVTs and recommended the patient to go to the emergency department if the swelling becomes worse or any bleeding problems.    I spoke with the patient in the ED with the presence of his wife Kate.  He reported that the swelling did get worst since last week but have improved today and denies any pain.  When he went to  his Xarelto he was notified that the cost of his prescription was in the thousands of dollars and that was the reason why he came back to the emergency department due to cost.     In the ED:  CMP and CBC differential were unremarkable except for noted thrombocytosis of 525  CT angio chest for PE was obtained and pending currently    Patient was started on heparin drip.     Past Medical History   Past Medical History:   Diagnosis Date    Seizures (Multi)       Past Surgical History:   Past Surgical History:   Procedure Laterality Date    CARDIAC ELECTROPHYSIOLOGY PROCEDURE N/A 4/24/2024    Procedure: Temporary  "Pacemaker Insertion;  Surgeon: León Park MD;  Location: [facility];  Service: Cardiovascular;  Laterality: N/A;    CARDIAC ELECTROPHYSIOLOGY PROCEDURE N/A 4/25/2024    Procedure: PPM IMPLANT DUAL;  Surgeon: Sandoval Jennings MD;  Location: [facility];  Service: Electrophysiology;  Laterality: N/A;     Family History:   No family history on file.  Social History:   Tobacco Use: High Risk (5/13/2024)    Patient History     Smoking Tobacco Use: Every Day     Smokeless Tobacco Use: Never     Passive Exposure: Not on file      Social History     Substance and Sexual Activity   Alcohol Use Not Currently       Outpatient Medications:  Current Outpatient Medications   Medication Instructions    lamoTRIgine (LAMICTAL) 50 mg, oral, 2 times daily, Per pt, cut his dose down without speaking with provider    levETIRAcetam (KEPPRA) 500 mg, oral, 2 times daily    rivaroxaban (XARELTO) 15 mg, oral, 2 times daily with meals, Take with food.    rivaroxaban (XARELTO) 20 mg, oral, Daily with evening meal, Take with food.        ED Course   Vitals - /88   Pulse 76   Temp 36.8 °C (98.2 °F) (Skin)   Resp (!) 23   Wt 80.3 kg (177 lb)   SpO2 95%     Labs:   CBC:  Recent Labs     05/13/24 1923 05/03/24  0652 05/02/24  1349   WBC 7.9 8.0 8.9   HGB 13.7 13.9 13.4*   HCT 42.4 42.3 40.3*   * 271 278   MCV 94 92 91     CMP:  Recent Labs     05/13/24 1923 05/03/24  0652 05/02/24  1349    138 137   K 4.5 4.4 4.3    98 100   CO2 29 31 29   ANIONGAP 14 13 12   BUN 17 14 15   CREATININE 1.00 1.00 0.82   EGFR 82 82 >90   GLUCOSE 80 88 75     Recent Labs     05/13/24 1923 05/03/24 0652 05/02/24  1349   ALBUMIN 4.0 4.0 3.9   ALKPHOS 127 97 91   ALT 75* 33 30   AST 32 20 18   BILITOT 0.3 0.5 0.3     Calcium/Phos:   Lab Results   Component Value Date    CALCIUM 9.5 05/13/2024    PHOS 3.9 05/13/2024      COAG: No results for input(s): \"INR\", \"DDIMERVTE\" in the last 63957 hours.  CRP: No " "results found for: \"CRP\"   [unfilled]   ENDO:  Recent Labs     02/11/22  1543   TSH 1.99      CARDIAC:   Recent Labs     04/24/24  1251 04/24/24  0953   TROPHS 32* 29*     Recent Labs     05/31/22  1247 02/11/22  1543   CHOL 159 235*   LDLCALC 71 153*   HDL 66 61   TRIG 110 106     No data recorded    Micro/ID:   No results found for the last 90 days.                   No lab exists for component: \"AGALPCRNB\"   .ID  No results found for: \"URINECULTURE\", \"BLOODCULT\", \"CSFCULTSMEAR\"    Imaging:   No orders to display     Encounter Date: 05/13/24   ECG 12 lead (Clinic Performed)   Result Value    Ventricular Rate 69    Atrial Rate 69    IN Interval 220    QRS Duration 176    QT Interval 424    QTC Calculation(Bazett) 454    P Axis 71    R Axis 269    T Axis 56    QRS Count 11    Q Onset 227    P Onset 117    P Offset 177    T Offset 439    QTC Fredericia 444    Narrative    Sinus rhythm with 1st degree AV block  Right bundle branch block  Abnormal ECG  When compared with ECG of 02-MAY-2024 13:31,  Previous ECG has undetermined rhythm, needs review     Transthoracic Echo (TTE) Complete    Result Date: 4/24/2024   [facility], [address] [place] [address]               Tel [phone] and Fax [phone] TRANSTHORACIC ECHOCARDIOGRAM REPORT  Patient Name:      AYDEE Craven Physician:    [id number] Henry MD Study Date:        4/24/2024            Ordering Provider:    [id number] MRN/PID:           [id number]             Fellow: Accession#:        [id number]         Nurse: Date of Birth/Age: 1955 / 69 years Sonographer:          Donna Will RDCS Gender:            M                    Additional Staff: Height:            172.72 cm            Admit Date:           4/24/2024 Weight:            86.18 kg             Admission Status:     " Inpatient -                                                               Routine BSA / BMI:         2.00 m2 / 28.89      Encounter#:           [id number]                    kg/m2                                         Department Location:  [facility] ICU Blood Pressure: 141 /70 mmHg Study Type:    TRANSTHORACIC ECHO (TTE) COMPLETE Diagnosis/ICD: Bradycardia, unspecified-R00.1 Indication:    Symptomatic bradycardia CPT Code:      Echo Complete w Full Doppler-16120 Patient History: Pertinent History: Bradycardia, S/P temporaty pacemaker, Seizure disorder. Study Detail: The following Echo studies were performed: 2D, M-Mode, Doppler and               color flow. The patient was awake.  PHYSICIAN INTERPRETATION: Left Ventricle: The left ventricular systolic function is normal, with an estimated ejection fraction of 55-60%. There are no regional wall motion abnormalities. The left ventricular cavity size is normal. Abnormal (paradoxical) septal motion, consistent with RV pacemaker. Spectral Doppler shows a normal pattern of left ventricular diastolic filling. Left Atrium: The left atrium is normal in size. Right Ventricle: The right ventricle is normal in size. There is normal right ventricular global systolic function. Right Atrium: The right atrium is upper limits of normal in size. There is a device visualized in the right atrium. Aortic Valve: The aortic valve is probably trileaflet. There is mild aortic valve cusp calcification. There is evidence of mildly elevated transaortic gradients consistent with sclerosis of the aortic valve. There is no evidence of aortic valve regurgitation. The peak instantaneous gradient of the aortic valve is 14.2 mmHg. The mean gradient of the aortic valve is 7.3 mmHg. Mitral Valve: The mitral valve is normal in structure. There is trace mitral valve regurgitation. Tricuspid Valve: The tricuspid valve is structurally normal. There is trace tricuspid regurgitation. The right ventricular  systolic pressure is unable to be estimated. Pulmonic Valve: The pulmonic valve is not well visualized. The pulmonic valve regurgitation was not well visualized. Pericardium: There is no pericardial effusion noted. Aorta: The aortic root is normal. Systemic Veins: The inferior vena cava appears to be of normal size. There is IVC inspiratory collapse greater than 50%. In comparison to the previous echocardiogram(s): There are no prior studies on this patient for comparison purposes.  CONCLUSIONS:  1. Left ventricular systolic function is normal with a 55-60% estimated ejection fraction.  2. Abnormal septal motion consistent with RV pacemaker.  3. Aortic valve sclerosis. QUANTITATIVE DATA SUMMARY: 2D MEASUREMENTS:                          Normal Ranges: IVSd:          0.85 cm   (0.6-1.1cm) LVPWd:         0.94 cm   (0.6-1.1cm) LVIDd:         4.33 cm   (3.9-5.9cm) LVIDs:         3.17 cm LV Mass Index: 62.2 g/m2 LV % FS        26.8 % LA VOLUME:                               Normal Ranges: LA Vol A4C:        41.6 ml    (22+/-6mL/m2) LA Vol A2C:        51.0 ml LA Vol BP:         46.9 ml LA Vol Index A4C:  20.8 ml/m2 LA Vol Index A2C:  25.5 ml/m2 LA Vol Index BP:   23.5 ml/m2 LA Area A4C:       17.0 cm2 LA Area A2C:       18.5 cm2 LA Major Axis A4C: 5.9 cm LA Major Axis A2C: 5.7 cm LA Volume Index:   23.3 ml/m2 LA Vol A4C:        39.5 ml LA Vol A2C:        49.4 ml RA VOLUME BY A/L METHOD:                       Normal Ranges: RA Area A4C: 18.6 cm2 M-MODE MEASUREMENTS:                  Normal Ranges: Ao Root: 3.00 cm (2.0-3.7cm) LAs:     3.70 cm (2.7-4.0cm) AORTA MEASUREMENTS:                      Normal Ranges: Ao Sinus, d: 3.00 cm (2.1-3.5cm) LV SYSTOLIC FUNCTION BY 2D PLANIMETRY (MOD):                     Normal Ranges: EF-A4C View: 54.6 % (>=55%) EF-A2C View: 57.0 % EF-Biplane:  57.8 % LV DIASTOLIC FUNCTION:                             Normal Ranges: MV Peak E:      0.89 m/s    (0.7-1.2 m/s) MV Peak A:      0.62 m/s     (0.42-0.7 m/s) E/A Ratio:      1.43        (1.0-2.2) MV e'           0.12 m/s    (>8.0) MV lateral e'   0.12 m/s MV medial e'    0.12 m/s MV A Dur:       114.18 msec E/e' Ratio:     7.43        (<8.0) PulmV Sys Chaka:  48.45 cm/s PulmV Meneses Chaka: 31.03 cm/s PulmV S/D Chaka:  1.56 MITRAL VALVE:                 Normal Ranges: MV DT: 165 msec (150-240msec) AORTIC VALVE:                                    Normal Ranges: AoV Vmax:                1.88 m/s  (<=1.7m/s) AoV Peak P.2 mmHg (<20mmHg) AoV Mean P.3 mmHg  (1.7-11.5mmHg) LVOT Max Chaka:            1.45 m/s  (<=1.1m/s) AoV VTI:                 33.17 cm  (18-25cm) LVOT VTI:                22.39 cm LVOT Diameter:           2.01 cm   (1.8-2.4cm) AoV Area, VTI:           2.15 cm2  (2.5-5.5cm2) AoV Area,Vmax:           2.44 cm2  (2.5-4.5cm2) AoV Dimensionless Index: 0.68  RIGHT VENTRICLE: RV Basal 3.70 cm RV Mid   2.60 cm RV Major 8.3 cm TAPSE:   27.0 mm RV s'    0.15 m/s TRICUSPID VALVE/RVSP:                             Normal Ranges: Peak TR Velocity: 2.35 m/s RV Syst Pressure: 25.0 mmHg (< 30mmHg) IVC Diam:         1.81 cm PULMONIC VALVE:                      Normal Ranges: PV Max Chaka: 0.8 m/s  (0.6-0.9m/s) PV Max P.8 mmHg Pulmonary Veins: PulmV Meneses Chaka: 31.03 cm/s PulmV S/D Chaka:  1.56 PulmV Sys Chaka:  48.45 cm/s  [id number] Sharath Henry MD Electronically signed on 2024 at 4:28:24 PM  ** Final **    ECG 12 lead (Clinic Performed)    Result Date: 2024  Sinus rhythm with 1st degree AV block Right bundle branch block Abnormal ECG When compared with ECG of 02-MAY-2024 13:31, Previous ECG has undetermined rhythm, needs review    Vascular US upper extremity venous duplex left    Result Date: 2024          [facility] [address] [id number]  Tel [phone] and Fax [phone]  Vascular Lab Report VASC US UPPER EXTREMITY VENOUS DUPLEX LEFT  Patient Name:     AYDEE RAO        [place]           [address]  [address]                                       Physician:        MD Study Date:       5/13/2024           Ordering          [id number] SANDOVAL GALAVIZ                                       Physician:        CANDY MRN/PID:          [id number]            Technologist:     Esther Maldonado T Accession#:       [id number]        Technologist 2: Date of           1955 / 69      Encounter#:       [id number] Birth/Age:        years Gender:           M Admission Status: Outpatient          Location          [facility]                                       Performed:  Diagnosis/ICD: Other specified soft tissue disorders-M79.89 CPT Codes:     49392 Peripheral venous duplex scan for DVT Limited  **CRITICAL RESULT** Critical Result: Acute DVT Notification called to Dr. Sandoval Jennings at 8:39:30 AM by Esther Maldonado.  CONCLUSIONS: Right Upper Venous: The subclavian vein demonstrates a normal spontaneous and phasic flow. Left Upper Venous: There is acute occlusive deep vein thrombosis visualized in the axillary, acute occlusive deep vein thrombosis visualized in the proximal subclavian, acute occlusive deep vein thrombosis visualized in the mid subclavian, acute occlusive deep vein thrombosis visualized in the distal subclavian and acute occlusive superficial venous thrombosis visualized in the basilic veins. There is acute non-occlusive deep vein thrombosis visualized in the brachial, acute non-occlusive superficial venous thrombosis visualized in the proximal cephalic and acute non-occlusive superficial venous thrombosis visualized in the mid cephalic veins. Acute non-occlusive thrombus noted in left cephalic at antecubital fossa.  Imaging & Doppler Findings:  Right             Flow Subclavian Spontaneous/Phasic  Left                Compress      Thrombus Internal Jugular      Yes           None Subclavian Proximal    No      Acute occlusive Subclavian Mid         No      Acute occlusive Subclavian  Distal      No      Acute occlusive Axillary               No      Acute occlusive Brachial               No    Acute non-occlusive Basilic                No      Acute occlusive  [id number] Ross Singh MD Electronically signed by [id number] Ross Singh MD on 5/13/2024 at 10:54:06 AM  ** Final **      Interventions:  Medications   heparin 25,000 Units in dextrose 5% 250 mL (100 Units/mL) infusion (premix) (1,400 Units/hr intravenous New Bag 5/13/24 1923)   heparin bolus from bag 3,000-6,000 Units (has no administration in time range)   heparin bolus from bag 6,424 Units (6,424 Units intravenous Bolus from Bag 5/13/24 1915)   iohexol (OMNIPaque) 350 mg iodine/mL solution 69 mL (69 mL intravenous Given 5/13/24 2045)       Objective Data  Physical Exam  Constitutional:       General: He is not in acute distress.     Appearance: He is not ill-appearing.   HENT:      Head: Normocephalic and atraumatic.   Cardiovascular:      Rate and Rhythm: Normal rate.   Pulmonary:      Effort: Pulmonary effort is normal. No respiratory distress.      Breath sounds: No wheezing or rales.   Abdominal:      Palpations: Abdomen is soft.      Tenderness: There is no abdominal tenderness.   Musculoskeletal:         General: Swelling (left upper extremity) present.   Skin:     General: Skin is warm.   Neurological:      Mental Status: He is alert and oriented to person, place, and time.      Motor: No weakness.   Psychiatric:         Mood and Affect: Mood normal.         Behavior: Behavior normal.         Thought Content: Thought content normal.         Judgment: Judgment normal.                Assessment and Plan   Deonte Leavitt, a 68 y/o M with PMHx of seizure disorder, tobacco use, and complete heart block s/p PPM ([facility] - 04/25/2024) followed by RV lead malfunction requiring RV lead removal and implant of new RV lead (05/03/2024) presented with left upper extremity swelling that was noticed when the patient was discharged last week after  new RV lead implant placement at [facility].  Patient was supposed to follow-up with EP cardiology and was seen by Dr. Geronimo today when an ultrasound of the left upper extremity was completed which showed acute DVT involving his left subclavian, axillary, brachial, cephalic and basilic veins.  Dr. Geronimo prescribed the patient Xarelto for acute DVTs and recommended the patient to go to the emergency department if the swelling becomes worse or any bleeding problems. I spoke with the patient in the ED with the presence of his wife Kate.  He reported that the swelling did get worst since last week but have improved today and denies any pain.  When he went to  his Xarelto he was notified that the cost of his prescription was in the thousands of dollars and that was the reason why he came back to the emergency department due to cost.         Acute Medical Issues:  #Acute DVTs of the left upper extremity   -Patient + left upper extremity swelling  -US Left Upper Venous: There is acute occlusive deep vein thrombosis visualized in the axillary, acute occlusive deep vein thrombosis visualized in the proximal subclavian, acute occlusive deep vein thrombosis visualized in the mid subclavian, acute occlusive deep vein thrombosis visualized in the distal subclavian and acute occlusive superficial venous thrombosis visualized in the basilic veins. There is acute non-occlusive deep vein thrombosis visualized in the brachial, acute non-occlusive superficial venous thrombosis visualized in the proximal cephalic and acute non-occlusive superficial venous thrombosis visualized in the mid cephalic veins. Acute non-occlusive thrombus noted in left cephalic at antecubital fossa.   -EP Cardiology Dr. Geronimo saw the patient today and completed the US. He started the patient on Xarelto. Patient returned to the ED due to medication cost.   -Placed on heparin drip in the ED   -CT angio chest completed in the ED and did  not show acute PE  -Will place patient NPO at midnight and Dr. Hernandez consulted and will evaluate the patient in the morning for possible intervention       Chronic Medical Issues:   #Seizure disorder: Continue home keppra 500mg BID and lamotrigine 50mg BID. Patient was seen by Neurology during his hospitalization at [facility] last week. Plan for EEG outpatient and Neurology follow up.   #Tobacco use: Discussed benefits and risks, advised on cessation, and added nicotine patch    Fluids: PO  Electrolytes: replete as needed  Nutrition: regular   GI Prophylaxis: none   DVT Prophylaxis: Heparin drip      Dispo: Patient admitted for the acute medical condition above. ELOS<48hours.

## 2024-05-14 NOTE — PROGRESS NOTES
05/14/24 1600   Referral Data   Referral Source Self referral   Referral Reason No insurance   County Information   County of Residence [facility]   Patient Information   Primary Caregiver Self   Provides Primary Care For No One   Accompanied by/Relationship spouse/Kate   Support System Immediate family   Lives With spouse   Home Safety   Feels Safe Living in Home Yes   Activities of Daily Living   Functional Status Independent   Living Arrangement Apartment   Ambulation Independent   Dressing Independent   Feeding Independent   Bathing/Grooming No assistance   Behavior Oriented   Communication Can write;Understands speaking;Understands English;Reads   Income Information   Employment Status for Patient   Employment Status Retired   Referral To   Financial Resources Medicaid   Application for Public Assistance Applied   Community Resources Other (Comment)  (SW reached out to [facility].  Per [facility], process for Medicaid has already been made.  [facility] provided add'l info as requested & rec'd confirmation that application will be submitted.  SW updated pt & spouse at bedside.)   Emotional/ Psychological   Person Assessed Patient   Affect No Deficits Noted   Behaviors/Mood Appropriate for age;Appropriate for situation;Calm;Cooperative;Pleasant   Verbal Skills No Deficits Noted   Current Interpersonal Conduct/ Behavior Appropriate to Situation;Cooperative   Mental Health Conditions/ Symptoms None   Thought Process Alterations No Deficits Noted   Coping/ Stress   Reaction to Health Status Adjusting   Understanding of Condition and Treatment Adequate Understanding of Medical Condition

## 2024-05-14 NOTE — PROGRESS NOTES
05/14/24 1400   Discharge Planning   Patient expects to be discharged to: Made aware patient will need to dc on Eliquis. Price check done through [facility] outpatient pharmacy and cost is $746 per month. Spoke with patient and he is unable to afford cost. Made patient aware that first month will be free through [facility] and patient given the Eliquis  hardship paperwork to fill out. Patient and spouse made aware to complete and submit the paperwork with the  [employer]. Patient aware he will likely need 3 months of Eliquis.

## 2024-05-15 ENCOUNTER — PHARMACY VISIT (OUTPATIENT)
Dept: PHARMACY | Facility: CLINIC | Age: 69
End: 2024-05-15
Payer: COMMERCIAL

## 2024-05-15 VITALS
DIASTOLIC BLOOD PRESSURE: 72 MMHG | RESPIRATION RATE: 16 BRPM | BODY MASS INDEX: 27.68 KG/M2 | TEMPERATURE: 97.5 F | SYSTOLIC BLOOD PRESSURE: 147 MMHG | HEART RATE: 77 BPM | WEIGHT: 176.37 LBS | OXYGEN SATURATION: 94 % | HEIGHT: 67 IN

## 2024-05-15 DIAGNOSIS — I82.629 DEEP VEIN THROMBOSIS (DVT) OF UPPER EXTREMITY, UNSPECIFIED CHRONICITY, UNSPECIFIED LATERALITY, UNSPECIFIED VEIN (MULTI): Primary | ICD-10-CM

## 2024-05-15 LAB
ALBUMIN SERPL BCP-MCNC: 3.7 G/DL (ref 3.4–5)
ALP SERPL-CCNC: 112 U/L (ref 33–136)
ALT SERPL W P-5'-P-CCNC: 51 U/L (ref 10–52)
ANION GAP SERPL CALC-SCNC: 11 MMOL/L (ref 10–20)
AST SERPL W P-5'-P-CCNC: 20 U/L (ref 9–39)
BASOPHILS # BLD AUTO: 0.04 X10*3/UL (ref 0–0.1)
BASOPHILS NFR BLD AUTO: 0.6 %
BILIRUB SERPL-MCNC: 0.3 MG/DL (ref 0–1.2)
BUN SERPL-MCNC: 13 MG/DL (ref 6–23)
CALCIUM SERPL-MCNC: 9.2 MG/DL (ref 8.6–10.3)
CHLORIDE SERPL-SCNC: 100 MMOL/L (ref 98–107)
CO2 SERPL-SCNC: 29 MMOL/L (ref 21–32)
CREAT SERPL-MCNC: 0.89 MG/DL (ref 0.5–1.3)
EGFRCR SERPLBLD CKD-EPI 2021: >90 ML/MIN/1.73M*2
EOSINOPHIL # BLD AUTO: 0.15 X10*3/UL (ref 0–0.7)
EOSINOPHIL NFR BLD AUTO: 2.2 %
ERYTHROCYTE [DISTWIDTH] IN BLOOD BY AUTOMATED COUNT: 12.7 % (ref 11.5–14.5)
ERYTHROCYTE [DISTWIDTH] IN BLOOD BY AUTOMATED COUNT: 12.8 % (ref 11.5–14.5)
GLUCOSE SERPL-MCNC: 117 MG/DL (ref 74–99)
HCT VFR BLD AUTO: 40.2 % (ref 41–52)
HCT VFR BLD AUTO: 41.1 % (ref 41–52)
HGB BLD-MCNC: 12.9 G/DL (ref 13.5–17.5)
HGB BLD-MCNC: 13.2 G/DL (ref 13.5–17.5)
IMM GRANULOCYTES # BLD AUTO: 0.06 X10*3/UL (ref 0–0.7)
IMM GRANULOCYTES NFR BLD AUTO: 0.9 % (ref 0–0.9)
LYMPHOCYTES # BLD AUTO: 2.49 X10*3/UL (ref 1.2–4.8)
LYMPHOCYTES NFR BLD AUTO: 35.8 %
MCH RBC QN AUTO: 29.9 PG (ref 26–34)
MCH RBC QN AUTO: 30.4 PG (ref 26–34)
MCHC RBC AUTO-ENTMCNC: 32.1 G/DL (ref 32–36)
MCHC RBC AUTO-ENTMCNC: 32.1 G/DL (ref 32–36)
MCV RBC AUTO: 93 FL (ref 80–100)
MCV RBC AUTO: 95 FL (ref 80–100)
MONOCYTES # BLD AUTO: 0.68 X10*3/UL (ref 0.1–1)
MONOCYTES NFR BLD AUTO: 9.8 %
NEUTROPHILS # BLD AUTO: 3.54 X10*3/UL (ref 1.2–7.7)
NEUTROPHILS NFR BLD AUTO: 50.7 %
NRBC BLD-RTO: 0 /100 WBCS (ref 0–0)
NRBC BLD-RTO: 0 /100 WBCS (ref 0–0)
PLATELET # BLD AUTO: 468 X10*3/UL (ref 150–450)
PLATELET # BLD AUTO: 470 X10*3/UL (ref 150–450)
POTASSIUM SERPL-SCNC: 3.8 MMOL/L (ref 3.5–5.3)
PROT SERPL-MCNC: 6.6 G/DL (ref 6.4–8.2)
RBC # BLD AUTO: 4.24 X10*6/UL (ref 4.5–5.9)
RBC # BLD AUTO: 4.42 X10*6/UL (ref 4.5–5.9)
SODIUM SERPL-SCNC: 136 MMOL/L (ref 136–145)
UFH PPP CHRO-ACNC: 0.4 IU/ML
WBC # BLD AUTO: 7 X10*3/UL (ref 4.4–11.3)
WBC # BLD AUTO: 7.2 X10*3/UL (ref 4.4–11.3)

## 2024-05-15 PROCEDURE — 85520 HEPARIN ASSAY: CPT | Performed by: INTERNAL MEDICINE

## 2024-05-15 PROCEDURE — 99239 HOSP IP/OBS DSCHRG MGMT >30: CPT | Performed by: STUDENT IN AN ORGANIZED HEALTH CARE EDUCATION/TRAINING PROGRAM

## 2024-05-15 PROCEDURE — 85025 COMPLETE CBC W/AUTO DIFF WBC: CPT | Performed by: STUDENT IN AN ORGANIZED HEALTH CARE EDUCATION/TRAINING PROGRAM

## 2024-05-15 PROCEDURE — 84075 ASSAY ALKALINE PHOSPHATASE: CPT | Performed by: STUDENT IN AN ORGANIZED HEALTH CARE EDUCATION/TRAINING PROGRAM

## 2024-05-15 PROCEDURE — 2500000001 HC RX 250 WO HCPCS SELF ADMINISTERED DRUGS (ALT 637 FOR MEDICARE OP)

## 2024-05-15 PROCEDURE — 96366 THER/PROPH/DIAG IV INF ADDON: CPT

## 2024-05-15 PROCEDURE — 85027 COMPLETE CBC AUTOMATED: CPT | Performed by: STUDENT IN AN ORGANIZED HEALTH CARE EDUCATION/TRAINING PROGRAM

## 2024-05-15 PROCEDURE — 96365 THER/PROPH/DIAG IV INF INIT: CPT

## 2024-05-15 PROCEDURE — 2500000004 HC RX 250 GENERAL PHARMACY W/ HCPCS (ALT 636 FOR OP/ED): Performed by: STUDENT IN AN ORGANIZED HEALTH CARE EDUCATION/TRAINING PROGRAM

## 2024-05-15 PROCEDURE — 2500000001 HC RX 250 WO HCPCS SELF ADMINISTERED DRUGS (ALT 637 FOR MEDICARE OP): Performed by: STUDENT IN AN ORGANIZED HEALTH CARE EDUCATION/TRAINING PROGRAM

## 2024-05-15 PROCEDURE — G0378 HOSPITAL OBSERVATION PER HR: HCPCS

## 2024-05-15 PROCEDURE — 36415 COLL VENOUS BLD VENIPUNCTURE: CPT | Performed by: STUDENT IN AN ORGANIZED HEALTH CARE EDUCATION/TRAINING PROGRAM

## 2024-05-15 RX ADMIN — LAMOTRIGINE 50 MG: 25 TABLET ORAL at 04:53

## 2024-05-15 RX ADMIN — APIXABAN 10 MG: 5 TABLET, FILM COATED ORAL at 12:16

## 2024-05-15 RX ADMIN — LEVETIRACETAM 500 MG: 500 TABLET, FILM COATED ORAL at 04:53

## 2024-05-15 RX ADMIN — HEPARIN SODIUM 1400 UNITS/HR: 10000 INJECTION, SOLUTION INTRAVENOUS at 03:00

## 2024-05-15 ASSESSMENT — COGNITIVE AND FUNCTIONAL STATUS - GENERAL
DAILY ACTIVITIY SCORE: 24
MOBILITY SCORE: 24

## 2024-05-15 ASSESSMENT — PAIN - FUNCTIONAL ASSESSMENT: PAIN_FUNCTIONAL_ASSESSMENT: 0-10

## 2024-05-15 ASSESSMENT — PAIN SCALES - GENERAL: PAINLEVEL_OUTOF10: 0 - NO PAIN

## 2024-05-15 NOTE — PROGRESS NOTES
05/15/24 1135   Discharge Planning   Living Arrangements Spouse/significant other   Support Systems Spouse/significant other   Assistance Needed A&OX3; independent with ADLs with no DME; drives; room air baseline and currently room air   Type of Residence Private residence   Number of Stairs to Enter Residence 1   Number of Stairs Within Residence 1   Do you have animals or pets at home? No   Type of Animals or Pets patient stated no pets at home   Who is requesting discharge planning? Provider   Patient expects to be discharged to: Home denies need for Home care, BETINA assisting with eliquis assistance

## 2024-05-15 NOTE — CARE PLAN
SW followed up with pt who has completed application for financial assistance for [employer]; SW faxed documents for processing.  No other needs identified

## 2024-05-15 NOTE — DISCHARGE SUMMARY
Discharge Diagnosis  Acute deep vein thrombosis (DVT) of axillary vein of left upper extremity (Multi)    Issues Requiring Follow-Up  Post hospital follow up    Discharge Meds     Your medication list        START taking these medications        Instructions Last Dose Given Next Dose Due   apixaban 5 mg (74 tabs) tablet  Commonly known as: Eliquis      Take 2 tablets (10 mg) by mouth 2 times a day for 7 days, then take 1 tablet (5 mg) by mouth 2 times a day.              CONTINUE taking these medications        Instructions Last Dose Given Next Dose Due   lamoTRIgine 25 mg tablet  Commonly known as: LaMICtal      Take 2 tablets (50 mg) by mouth 2 times a day. Per pt, cut his dose down without speaking with provider       levETIRAcetam 500 mg tablet  Commonly known as: Keppra                  STOP taking these medications      cephalexin 500 mg capsule  Commonly known as: Keflex        rivaroxaban 15 mg tablet  Commonly known as: Xarelto        rivaroxaban 20 mg tablet  Commonly known as: Xarelto                  Where to Get Your Medications        These medications were sent to [facility]  [address] , [facility] [address]      Hours: 9 AM to 5 PM Mon-Fri Phone: [phone]   apixaban 5 mg (74 tabs) tablet         Test Results Pending At Discharge  Pending Labs       No current pending labs.            Hospital Course   Deonte Leavitt, a 70 y/o M with PMHx of seizure disorder, tobacco use, and complete heart block s/p PPM ([facility] - 04/25/2024) followed by RV lead malfunction requiring RV lead removal and implant of new RV lead (05/03/2024) presented with left upper extremity swelling that was noticed when the patient was discharged last week after new RV lead implant placement at [facility].  Patient was supposed to follow-up with EP cardiology and was seen by Dr. Geronimo today when an ultrasound of the left upper extremity was completed which showed acute DVT involving his left subclavian, axillary,  brachial, cephalic and basilic veins.  Dr. Geronimo prescribed the patient Xarelto for acute DVTs and recommended the patient to go to the emergency department if the swelling becomes worse or any bleeding problems. He reported that the swelling did get worst since last week but have improved today and denies any pain.  When he went to  his Xarelto he was notified that the cost of his prescription was in the thousands of dollars and that was the reason why he came back to the emergency department due to cost. SW was consulted. Pt is given free 30 day supply of eliquis and he filled pt assistance program to help with further scripts of eliquis. Pt is hemodynamically stable for discharge at this time with close outpatient follow ups.     The patient was discharged in satisfactory condition.   Medications and side effect profile reviewed with patient.  More than 30 minutes were spent in coordinating patient discharge       Pertinent Physical Exam At Time of Discharge  Physical Exam  Vitals and nursing note reviewed.   Constitutional:       General: He is not in acute distress.     Appearance: Normal appearance. He is not ill-appearing or toxic-appearing.   HENT:      Head: Normocephalic and atraumatic.      Nose: Nose normal.      Mouth/Throat:      Mouth: Mucous membranes are moist.   Eyes:      Extraocular Movements: Extraocular movements intact.      Conjunctiva/sclera: Conjunctivae normal.      Pupils: Pupils are equal, round, and reactive to light.   Cardiovascular:      Rate and Rhythm: Normal rate and regular rhythm.      Heart sounds: No murmur heard.     No gallop.   Pulmonary:      Effort: Pulmonary effort is normal. No respiratory distress.      Breath sounds: Normal breath sounds. No wheezing, rhonchi or rales.   Abdominal:      General: Abdomen is flat. Bowel sounds are normal. There is no distension.      Palpations: Abdomen is soft. There is no mass.      Tenderness: There is no abdominal  tenderness.   Musculoskeletal:         General: Swelling present. No tenderness. Normal range of motion.      Cervical back: Normal range of motion and neck supple.   Skin:     General: Skin is warm and dry.   Neurological:      General: No focal deficit present.      Mental Status: He is alert and oriented to person, place, and time. Mental status is at baseline.   Psychiatric:         Mood and Affect: Mood normal.         Behavior: Behavior normal.         Thought Content: Thought content normal.         Judgment: Judgment normal.         Outpatient Follow-Up  Future Appointments   Date Time Provider Department Center   5/28/2024  3:00 PM BRYAN GIORDANO [facility]   6/3/2024  8:00 AM Sandoval Jennings MD [facility]         Lima Valdez MD  hospitalist

## 2024-05-16 ENCOUNTER — PATIENT OUTREACH (OUTPATIENT)
Dept: HOME HEALTH SERVICES | Age: 69
End: 2024-05-16

## 2024-05-16 SDOH — ECONOMIC STABILITY: FOOD INSECURITY: WITHIN THE PAST 12 MONTHS, YOU WORRIED THAT YOUR FOOD WOULD RUN OUT BEFORE YOU GOT MONEY TO BUY MORE.: SOMETIMES TRUE

## 2024-05-16 SDOH — HEALTH STABILITY: MENTAL HEALTH
STRESS IS WHEN SOMEONE FEELS TENSE, NERVOUS, ANXIOUS, OR CAN'T SLEEP AT NIGHT BECAUSE THEIR MIND IS TROUBLED. HOW STRESSED ARE YOU?: RATHER MUCH

## 2024-05-16 SDOH — HEALTH STABILITY: MENTAL HEALTH
HOW OFTEN DO YOU NEED TO HAVE SOMEONE HELP YOU WHEN YOU READ INSTRUCTIONS, PAMPHLETS, OR OTHER WRITTEN MATERIAL FROM YOUR DOCTOR OR PHARMACY?: NEVER

## 2024-05-16 SDOH — ECONOMIC STABILITY: INCOME INSECURITY: HOW HARD IS IT FOR YOU TO PAY FOR THE VERY BASICS LIKE FOOD, HOUSING, MEDICAL CARE, AND HEATING?: SOMEWHAT HARD

## 2024-05-16 SDOH — ECONOMIC STABILITY: INCOME INSECURITY: IN THE PAST 12 MONTHS, HAS THE ELECTRIC, GAS, OIL, OR WATER COMPANY THREATENED TO SHUT OFF SERVICE IN YOUR HOME?: NO

## 2024-05-16 SDOH — HEALTH STABILITY: MENTAL HEALTH: HOW OFTEN DO YOU HAVE A DRINK CONTAINING ALCOHOL?: NEVER

## 2024-05-16 SDOH — HEALTH STABILITY: MENTAL HEALTH: HOW MANY STANDARD DRINKS CONTAINING ALCOHOL DO YOU HAVE ON A TYPICAL DAY?: PATIENT DOES NOT DRINK

## 2024-05-16 SDOH — SOCIAL STABILITY: SOCIAL NETWORK
DO YOU BELONG TO ANY CLUBS OR ORGANIZATIONS SUCH AS CHURCH GROUPS UNIONS, FRATERNAL OR ATHLETIC GROUPS, OR SCHOOL GROUPS?: YES

## 2024-05-16 SDOH — SOCIAL STABILITY: SOCIAL INSECURITY: WITHIN THE LAST YEAR, HAVE YOU BEEN HUMILIATED OR EMOTIONALLY ABUSED IN OTHER WAYS BY YOUR PARTNER OR EX-PARTNER?: NO

## 2024-05-16 SDOH — ECONOMIC STABILITY: FOOD INSECURITY: WITHIN THE PAST 12 MONTHS, THE FOOD YOU BOUGHT JUST DIDN'T LAST AND YOU DIDN'T HAVE MONEY TO GET MORE.: SOMETIMES TRUE

## 2024-05-16 SDOH — HEALTH STABILITY: MENTAL HEALTH: HOW OFTEN DO YOU HAVE 6 OR MORE DRINKS ON ONE OCCASION?: NEVER

## 2024-05-16 SDOH — SOCIAL STABILITY: SOCIAL INSECURITY: WITHIN THE LAST YEAR, HAVE YOU BEEN AFRAID OF YOUR PARTNER OR EX-PARTNER?: NO

## 2024-05-16 SDOH — SOCIAL STABILITY: SOCIAL NETWORK: ARE YOU MARRIED, WIDOWED, DIVORCED, SEPARATED, NEVER MARRIED, OR LIVING WITH A PARTNER?: MARRIED

## 2024-05-16 SDOH — SOCIAL STABILITY: SOCIAL INSECURITY
WITHIN THE LAST YEAR, HAVE TO BEEN RAPED OR FORCED TO HAVE ANY KIND OF SEXUAL ACTIVITY BY YOUR PARTNER OR EX-PARTNER?: NO

## 2024-05-16 SDOH — SOCIAL STABILITY: SOCIAL INSECURITY
WITHIN THE LAST YEAR, HAVE YOU BEEN KICKED, HIT, SLAPPED, OR OTHERWISE PHYSICALLY HURT BY YOUR PARTNER OR EX-PARTNER?: NO

## 2024-05-16 SDOH — HEALTH STABILITY: PHYSICAL HEALTH: ON AVERAGE, HOW MANY DAYS PER WEEK DO YOU ENGAGE IN MODERATE TO STRENUOUS EXERCISE (LIKE A BRISK WALK)?: 6 DAYS

## 2024-05-16 SDOH — ECONOMIC STABILITY: INCOME INSECURITY: IN THE LAST 12 MONTHS, WAS THERE A TIME WHEN YOU WERE NOT ABLE TO PAY THE MORTGAGE OR RENT ON TIME?: NO

## 2024-05-16 SDOH — HEALTH STABILITY: PHYSICAL HEALTH: ON AVERAGE, HOW MANY MINUTES DO YOU ENGAGE IN EXERCISE AT THIS LEVEL?: 150+ MIN

## 2024-05-16 SDOH — SOCIAL STABILITY: SOCIAL NETWORK
IN A TYPICAL WEEK, HOW MANY TIMES DO YOU TALK ON THE PHONE WITH FAMILY, FRIENDS, OR NEIGHBORS?: MORE THAN THREE TIMES A WEEK

## 2024-05-16 SDOH — ECONOMIC STABILITY: HOUSING INSECURITY: IN THE LAST 12 MONTHS, HOW MANY PLACES HAVE YOU LIVED?: 1

## 2024-05-16 SDOH — SOCIAL STABILITY: SOCIAL NETWORK: HOW OFTEN DO YOU ATTENT MEETINGS OF THE CLUB OR ORGANIZATION YOU BELONG TO?: MORE THAN 4 TIMES PER YEAR

## 2024-05-16 SDOH — SOCIAL STABILITY: SOCIAL NETWORK: HOW OFTEN DO YOU GET TOGETHER WITH FRIENDS OR RELATIVES?: ONCE A WEEK

## 2024-05-16 SDOH — SOCIAL STABILITY: SOCIAL NETWORK: HOW OFTEN DO YOU ATTEND CHURCH OR RELIGIOUS SERVICES?: NEVER

## 2024-05-16 ASSESSMENT — LIFESTYLE VARIABLES
SKIP TO QUESTIONS 9-10: 1
AUDIT-C TOTAL SCORE: 0

## 2024-05-16 NOTE — ED PROVIDER NOTES
CC: Coagulation Disorder (Confirmed blood clots in left arm.  Needs IV treatment.)     HPI:  Deonte Leavitt is a 69 y.o. male  with a PMH of  seizure, third degree heart block s/p PPM with lead replacement presenting to the ED today due to concern for DVT. Pt was recently admitted due to malfunction of his RV lead requiring replacement. He has been having some LUE swelling for a few days and had an OP DVT US of both extremities done. He had an extensive DVT of the LUE from the subclavian to the brachial vein with complete occlusion. His PCP referred him here for further management. He states he is having pain in his L upper arm and significant swelling with some discomfort from the swelling. He denies any chest pain however does note some shortness of breath. He states its difficult for him to differentiate this from restarting tobacco use vs new SOB. He denies fevers, chills, nausea, vomiting, or other conerns.     Limitations to History: none  Additional History provided by: N/A    External Records Reviewed:  Recent available ED and inpatient notes reviewed in EMR.  Reviewed OP note from Dr. Geronimo today.     PMHx/PSHx:  Per HPI.   -has Pacemaker; Complete heart block (Multi); Impetigo; Seizures (Multi); and Acute deep vein thrombosis (DVT) of axillary vein of left upper extremity (Multi) on their problem list.    - has a past surgical history that includes Cardiac electrophysiology procedure (N/A, 4/24/2024) and Cardiac electrophysiology procedure (N/A, 4/25/2024).    Medications:  Reviewed in EMR. See EMR for complete list of medications and doses.    Allergies:  Patient has no known allergies.    Social History:  - Tobacco:  reports that he has been smoking cigarettes. He has never used smokeless tobacco.   - Alcohol:  reports that he does not currently use alcohol.   - Illicit Drugs:  Drug: Marijuana.     ROS:  Per HPI.     ???????????????????????????????????????????????????????????????  Triage Vitals:  T  36.8 °C (98.2 °F)  HR 80  BP (!) 150/91  RR 20  O2 95 % None (Room air)    Physical Exam  Vitals and nursing note reviewed.   Constitutional:       General: He is not in acute distress.     Appearance: He is well-developed.   HENT:      Head: Normocephalic and atraumatic.   Eyes:      Conjunctiva/sclera: Conjunctivae normal.   Cardiovascular:      Rate and Rhythm: Normal rate and regular rhythm.      Heart sounds: No murmur heard.  Pulmonary:      Effort: Pulmonary effort is normal. No respiratory distress.      Breath sounds: Normal breath sounds.   Abdominal:      Palpations: Abdomen is soft.      Tenderness: There is no abdominal tenderness.   Musculoskeletal:         General: No swelling.      Cervical back: Neck supple.      Right lower leg: No edema.      Left lower leg: No edema.      Comments: Well healing surgical incision over L chest, L arm with 2+ edema, 2+ radial pulses, normal sensation, and slight blue discoloration. R arm normal.    Skin:     General: Skin is warm and dry.      Capillary Refill: Capillary refill takes less than 2 seconds.   Neurological:      Mental Status: He is alert.   Psychiatric:         Mood and Affect: Mood normal.       ???????????????????????????????????????????????????????????????  ED Course:  Diagnoses as of 05/16/24 0228   Acute deep vein thrombosis (DVT) of axillary vein of left upper extremity (Multi)       EKG & Images:  Independently reviewed, See ED Course  EKG with sinus rhytm, RBBB no JR, depression, or T wave inversion similar to previous from 5/5/24    MDM:  - The pt is a 68 yo M with a PMH of complete Heart block s/p PPM with RV lead replacement presenting from OP clinic due to concern for DVT of the LUE. The pt has had swelling for the past several days with worsening pain. He had an OP DVT US showing extensive DVT from the subclavian to the brachial vein. Vitals are reassuring however given his SOB will obtain CTPE to rule out PE in the setting of his  DVT. Labs were reassuring. Pt was started on high intensity heparin. Discussed the patient with admitting attending who accepted the patient.     Final diagnoses:   [I82.A12] Acute deep vein thrombosis (DVT) of axillary vein of left upper extremity (Multi)         Social Determinants Limiting Care:  None identified    Disposition:  Admit to floor    Juhi Araiza MD   Emergency Medicine Resident, PGY3  [facility]     Disclaimer: This note was dictated by speech recognition. Minor errors in transcription may be present    Procedures ? [email or web address] last updated 5/16/2024 2:28 AM        Juhi Araiza MD  Resident  05/16/24 022

## 2024-05-16 NOTE — PROGRESS NOTES
Enrollment call completed. Pt with recent PM placement which lead to DVT of LUE. Pt discharged on eliquis. Pt states he is on a plan that was started through the hospital where he should get the eliquis for no cost. No oxygen. Pt has already turned in medicaid application. Denies need for PT/OT. Pt in need of SW c/s for potential temporary financial assistance. PCP appt was scheduled for 5/29 at 1145. Aware of daily calls, any time. [facility]    Patient's Address:   [address] [place] [address] [place] [address]  **  If this is not the address patient will receive services - alert team and address in EMR**       Patient Contacts:  Extended Emergency Contact Information  Primary Emergency Contact: AMAYA RAO  Address: [address] [place] [address] [place] [address] [place]  Home Phone: [phone]  Work Phone: [phone]  Mobile Phone: [phone]  Relation: Spouse                                Patient's Preferred Phone: [phone]  Patient's E-mail: [email or web address]

## 2024-05-17 ENCOUNTER — PATIENT OUTREACH (OUTPATIENT)
Dept: HOME HEALTH SERVICES | Age: 69
End: 2024-05-17

## 2024-05-17 NOTE — PROGRESS NOTES
Daily Call Note:   Spoke to patient who states he is doing fine. Wants to talk to cardio about work limitation as he is an  and now has a pacemaker.   See neuro for history seizures. Hospital covered first month of Eliquis cost, Medicaid tracie pending for coverage.  SW re notified to follow up with medicaid tracie.  Pt reports no SS and not filing taxes for last 2 years, so PAP cannot be applied for. Syd notified that BETINA will follow up with medicaid tracie to make sure goes through before he needs additional doses of Eliquis.   States swelling in arm is improving, still has some bruising at IV site. Advised warm compress and elevation. Denies Sob or chest pain. Advised monitoring for worsening symptoms.  Reviewed next [facility].    Pt Education: POC  Barriers: na  Topics for Daily Review: POC  Pt demonstrates clear understanding: Yes    Daily Weight:  There were no vitals filed for this visit.   Last 3 Weights:  Wt Readings from Last 7 Encounters:   05/13/24 80 kg (176 lb 5.9 oz)   05/13/24 80.3 kg (177 lb 0.5 oz)   05/04/24 71.4 kg (157 lb 6.5 oz)   04/26/24 73.7 kg (162 lb 7.7 oz)       Masimo Device: No   Masimo Clinical Impression: na    Virtual Visits--Scheduled (Most Recent Date at Top)  Follow up Appointments  Recent Visits  No visits were found meeting these conditions.  Showing recent visits within past 30 days and meeting all other requirements  Future Appointments  Date Type Provider Dept   05/29/24 Appointment Cole Guadarrama DO [facility]   Showing future appointments within next 90 days and meeting all other requirements       Frequency of RN Calls & Virtual Visits per Team Agreement: Healthy at Home Frequency: Daily    Medication issues Addressed (what was done): medicaid tracie pending    Follow up appointments scheduled by [facility] Staff: na  Referrals made by [facility] staff: fang

## 2024-05-18 ENCOUNTER — PATIENT OUTREACH (OUTPATIENT)
Dept: HOME HEALTH SERVICES | Age: 69
End: 2024-05-18

## 2024-05-18 NOTE — PROGRESS NOTES
Daily call completed. Pt states he is feeling good today. Pt states he does not typically check his vitals at home, advised to do so especially if he becomes symptomatic. Verbalizes understanding. Pt states he has been taking all of his medications on time. Denies any questions/concerns/needs at this time. [facility] 5/19 at 1230.    Pt Education: vs monitoring  Barriers: na  Topics for Daily Review: daily needs  Pt demonstrates clear understanding: Yes    Daily Weight:  There were no vitals filed for this visit.   Last 3 Weights:  Wt Readings from Last 7 Encounters:   05/13/24 80 kg (176 lb 5.9 oz)   05/13/24 80.3 kg (177 lb 0.5 oz)   05/04/24 71.4 kg (157 lb 6.5 oz)   04/26/24 73.7 kg (162 lb 7.7 oz)       Masimo Device: No   Masimo Clinical Impression: na    Virtual Visits--Scheduled (Most Recent Date at Top)  Follow up Appointments  Recent Visits  No visits were found meeting these conditions.  Showing recent visits within past 30 days and meeting all other requirements  Future Appointments  Date Type Provider Dept   05/29/24 Appointment Cole Guadarrama DO [facility]   Showing future appointments within next 90 days and meeting all other requirements       Frequency of RN Calls & Virtual Visits per Team Agreement: Healthy at Home Frequency: Daily    Medication issues Addressed (what was done): na    Follow up appointments scheduled by [facility] Staff: na  Referrals made by [facility] staff: na      [facility] Care Transitions Assessment    Patient's Address:   [address] [place] [address]  [address]  [place] [address]  **  If this is not the address patient will receive services - alert team and address in EMR**       Patient Contacts:  Extended Emergency Contact Information  Primary Emergency Contact: AMAYA RAO  Address: [address] [place] [address] [place] [address] [place]  Home Phone: [phone]  Work Phone: [phone]  Mobile Phone: [phone]  Relation: Spouse                                 Patient's Preferred Phone: [phone]  Patient's E-mail: [email or web address]

## 2024-05-19 ENCOUNTER — PATIENT OUTREACH (OUTPATIENT)
Dept: HOME HEALTH SERVICES | Age: 69
End: 2024-05-19

## 2024-05-19 NOTE — PROGRESS NOTES
Daily Call Note:  [facility] weekly call complete w this RN, and Yony NP.  Pt reports he is feeling well.  No concerns voiced.  Reviewed medications, no refills needed.    Reviewed upcoming MD appts.   Next [facility] 5/24 at 0900    Pt Education:  POC   Barriers:   Topics for Daily Review:   Pt demonstrates clear understanding: Yes    Daily Weight:  There were no vitals filed for this visit.   Last 3 Weights:  Wt Readings from Last 7 Encounters:   05/13/24 80 kg (176 lb 5.9 oz)   05/13/24 80.3 kg (177 lb 0.5 oz)   05/04/24 71.4 kg (157 lb 6.5 oz)   04/26/24 73.7 kg (162 lb 7.7 oz)       Masimo Device: No   Masimo Clinical Impression:     Virtual Visits--Scheduled (Most Recent Date at Top)  Follow up Appointments  Recent Visits  No visits were found meeting these conditions.  Showing recent visits within past 30 days and meeting all other requirements  Future Appointments  Date Type Provider Dept   05/29/24 Appointment Cole Guadarrama DO [facility]   Showing future appointments within next 90 days and meeting all other requirements       Frequency of RN Calls & Virtual Visits per Team Agreement: Healthy at Home Frequency: Daily    Medication issues Addressed (what was done):     Follow up appointments scheduled by [facility] Staff:   Referrals made by [facility] staff:

## 2024-05-20 ENCOUNTER — PATIENT OUTREACH (OUTPATIENT)
Dept: HOME HEALTH SERVICES | Age: 69
End: 2024-05-20

## 2024-05-20 NOTE — PROGRESS NOTES
"Spoke with pt, states he is feeling wonderful. He did not check VS over weekend and he did not take his weight. He was told to check his VS is he \"feels off.\" He is aware of upcoming appointments. No questions or concerns at this time.     [facility]    Patient's Address:   [address] [place] [address]  [address]  [place] [address]  **  If this is not the address patient will receive services - alert team and address in EMR**       Patient Contacts:  Extended Emergency Contact Information  Primary Emergency Contact: AMAYA RAO  Address: [address] [place] [address] [place] [address] [place]  Home Phone: [phone]  Work Phone: [phone]  Mobile Phone: [phone]  Relation: Spouse                                Patient's Preferred Phone: [phone]  Patient's E-mail: [email or web address]                                      "

## 2024-05-21 ENCOUNTER — PATIENT OUTREACH (OUTPATIENT)
Dept: HOME HEALTH SERVICES | Age: 69
End: 2024-05-21

## 2024-05-21 NOTE — PROGRESS NOTES
Daily Call Note: Daily call completed with patient, states he is doing well , noted some swelling in rt hand , states has improved since yesterday, he does acknowledge using rt arm more yesterday helping grandson with car repair. states does not take vs, denies any other questions or concerns at this time. Reminded of next [facility] on5/24 @ 900    Pt Education: keep arm elevated while sitting  Barriers:   Topics for Daily Review: self acre  Pt demonstrates clear understanding: Yes    Daily Weight:  There were no vitals filed for this visit.   Last 3 Weights:  Wt Readings from Last 7 Encounters:   05/13/24 80 kg (176 lb 5.9 oz)   05/13/24 80.3 kg (177 lb 0.5 oz)   05/04/24 71.4 kg (157 lb 6.5 oz)   04/26/24 73.7 kg (162 lb 7.7 oz)       Masimo Device: No   Masimo Clinical Impression:     Virtual Visits--Scheduled (Most Recent Date at Top)  Follow up Appointments  Recent Visits  No visits were found meeting these conditions.  Showing recent visits within past 30 days and meeting all other requirements  Future Appointments  Date Type Provider Dept   05/29/24 Appointment Cole Guadarrama DO [facility]   Showing future appointments within next 90 days and meeting all other requirements       Frequency of RN Calls & Virtual Visits per Team Agreement: Healthy at Home Frequency: Daily

## 2024-05-22 ENCOUNTER — DOCUMENTATION (OUTPATIENT)
Dept: CARE COORDINATION | Age: 69
End: 2024-05-22

## 2024-05-22 ENCOUNTER — PATIENT OUTREACH (OUTPATIENT)
Dept: HOME HEALTH SERVICES | Age: 69
End: 2024-05-22

## 2024-05-22 LAB
ATRIAL RATE: 73 BPM
P AXIS: 74 DEGREES
P OFFSET: 170 MS
P ONSET: 109 MS
PR INTERVAL: 236 MS
Q ONSET: 227 MS
QRS COUNT: 12 BEATS
QRS DURATION: 166 MS
QT INTERVAL: 424 MS
QTC CALCULATION(BAZETT): 467 MS
QTC FREDERICIA: 452 MS
R AXIS: -89 DEGREES
T AXIS: 51 DEGREES
T OFFSET: 439 MS
VENTRICULAR RATE: 73 BPM

## 2024-05-22 NOTE — PROGRESS NOTES
Attempted to contact PT in regards to [facility] referral for resource assist: PT was not available, left a voice message w/my name and number for a return call back.      Discussed the following topics on behalf of the patient:  []  Behavioral Health Assistance     []  Case Management  []   Assistance  []  Digital Equity Assistance  []  Dental Health Assistance  []  Education Assistance  []  Employment Assistance  [x]  Financial Strain Relief Assistance  []  Food Insecurity Assistance  []  Healthcare Coverage Assistance  []  Housing Stability Assistance  []  IP Violence Relief Assistance  []  Legal Assistance  []  Physical Activity Assistance  []  Social Connection Assistance  []  Stress Relief Assistance   []  Substance Abuse Assistance  []  Transportation Assistance  []  Utility Assistance  []  Other: [insert comment here]    Next Steps:         Shahnaz Jones LCSW

## 2024-05-22 NOTE — PROGRESS NOTES
Daily Call Note:     Daily call completed with the patient. He states he is feeling better each and everyday.  He did mention that his arm and hand were swollen yesterday, but it has resolved. Informed pt that according to our Community Health Worker she left him a message with her name and call back info.  He states he will check his vm messages.  He is set for a call tomorrow, so please follow up regarding this.  Pt aware of upcoming appts.  No add'l questions or concerns during the call.      Addendum: received callback from patient who states he didn't get a message.  Sent secure chat to the [facility] thread, to let Shahnaz know to please try to reach out again.      Pt Education:   Barriers:   Topics for Daily Review:  Pt demonstrates clear understanding:    Daily Weight:  There were no vitals filed for this visit.   Last 3 Weights:  Wt Readings from Last 7 Encounters:   05/13/24 80 kg (176 lb 5.9 oz)   05/13/24 80.3 kg (177 lb 0.5 oz)   05/04/24 71.4 kg (157 lb 6.5 oz)   04/26/24 73.7 kg (162 lb 7.7 oz)       Masimo Device:   Masimo Clinical Impression:    Virtual Visits--Scheduled (Most Recent Date at Top)  Follow up Appointments  Recent Visits  No visits were found meeting these conditions.  Showing recent visits within past 30 days and meeting all other requirements  Future Appointments  Date Type Provider Dept   05/29/24 Appointment DO Michell Thakkar [id number]   Showing future appointments within next 90 days and meeting all other requirements       Frequency of RN Calls & Virtual Visits per Team Agreement:     Medication issues Addressed (what was done):     Follow up appointments scheduled by [facility] Staff:   Referrals made by [facility] staff:

## 2024-05-22 NOTE — PROGRESS NOTES
Attempted to contact PT again, no answer. Left my name and number for a return call.      Discussed the following topics on behalf of the patient:  []  Behavioral Health Assistance     []  Case Management  []   Assistance  []  Digital Equity Assistance  []  Dental Health Assistance  []  Education Assistance  []  Employment Assistance  [x]  Financial Strain Relief Assistance  []  Food Insecurity Assistance  []  Healthcare Coverage Assistance  []  Housing Stability Assistance  []  IP Violence Relief Assistance  [x]  Legal Assistance  []  Physical Activity Assistance  []  Social Connection Assistance  []  Stress Relief Assistance   []  Substance Abuse Assistance  []  Transportation Assistance  []  Utility Assistance  []  Other: [insert comment here]          Shahnaz Jones LCSW

## 2024-05-23 ENCOUNTER — DOCUMENTATION (OUTPATIENT)
Dept: CARE COORDINATION | Age: 69
End: 2024-05-23

## 2024-05-23 ENCOUNTER — PATIENT OUTREACH (OUTPATIENT)
Dept: HOME HEALTH SERVICES | Age: 69
End: 2024-05-23

## 2024-05-23 DIAGNOSIS — R00.1 BRADYCARDIA ON ECG: ICD-10-CM

## 2024-05-23 DIAGNOSIS — Z95.0 PACEMAKER: Primary | ICD-10-CM

## 2024-05-23 SDOH — ECONOMIC STABILITY: FOOD INSECURITY: WITHIN THE PAST 12 MONTHS, THE FOOD YOU BOUGHT JUST DIDN'T LAST AND YOU DIDN'T HAVE MONEY TO GET MORE.: SOMETIMES TRUE

## 2024-05-23 SDOH — ECONOMIC STABILITY: HOUSING INSECURITY: IN THE LAST 12 MONTHS, WAS THERE A TIME WHEN YOU WERE NOT ABLE TO PAY THE MORTGAGE OR RENT ON TIME?: NO

## 2024-05-23 SDOH — HEALTH STABILITY: MENTAL HEALTH: HOW OFTEN DO YOU HAVE SIX OR MORE DRINKS ON ONE OCCASION?: NEVER

## 2024-05-23 SDOH — ECONOMIC STABILITY: FOOD INSECURITY: WITHIN THE PAST 12 MONTHS, THE FOOD YOU BOUGHT JUST DIDN'T LAST AND YOU DIDN'T HAVE MONEY TO GET MORE.: NEVER TRUE

## 2024-05-23 SDOH — SOCIAL STABILITY: SOCIAL NETWORK: HOW OFTEN DO YOU GET TOGETHER WITH FRIENDS OR RELATIVES?: THREE TIMES A WEEK

## 2024-05-23 SDOH — ECONOMIC STABILITY: GENERAL: HOW HARD IS IT FOR YOU TO PAY FOR THE VERY BASICS LIKE FOOD, HOUSING, MEDICAL CARE, AND HEATING?: SOMEWHAT HARD

## 2024-05-23 SDOH — ECONOMIC STABILITY: FOOD INSECURITY
WITHIN THE PAST 12 MONTHS, YOU WORRIED THAT YOUR FOOD WOULD RUN OUT BEFORE YOU GOT THE MONEY TO BUY MORE.: SOMETIMES TRUE

## 2024-05-23 SDOH — SOCIAL STABILITY: SOCIAL NETWORK
DO YOU BELONG TO ANY CLUBS OR ORGANIZATIONS SUCH AS CHURCH GROUPS, UNIONS, FRATERNAL OR ATHLETIC GROUPS, OR SCHOOL GROUPS?: NO

## 2024-05-23 SDOH — SOCIAL STABILITY: SOCIAL NETWORK: HOW OFTEN DO YOU ATTEND CHURCH OR RELIGIOUS SERVICES?: 1 TO 4 TIMES PER YEAR

## 2024-05-23 SDOH — HEALTH STABILITY: MENTAL HEALTH: HOW OFTEN DO YOU HAVE A DRINK CONTAINING ALCOHOL?: NEVER

## 2024-05-23 SDOH — SOCIAL STABILITY: SOCIAL INSECURITY: WITHIN THE LAST YEAR, HAVE YOU BEEN AFRAID OF YOUR PARTNER OR EX-PARTNER?: NO

## 2024-05-23 SDOH — SOCIAL STABILITY: SOCIAL INSECURITY: WITHIN THE LAST YEAR, HAVE YOU BEEN HUMILIATED OR EMOTIONALLY ABUSED IN OTHER WAYS BY YOUR PARTNER OR EX-PARTNER?: NO

## 2024-05-23 SDOH — HEALTH STABILITY: MENTAL HEALTH
DO YOU FEEL STRESS - TENSE, RESTLESS, NERVOUS, OR ANXIOUS, OR UNABLE TO SLEEP AT NIGHT BECAUSE YOUR MIND IS TROUBLED ALL THE TIME - THESE DAYS?: ONLY A LITTLE

## 2024-05-23 SDOH — HEALTH STABILITY: PHYSICAL HEALTH: ON AVERAGE, HOW MANY DAYS PER WEEK DO YOU ENGAGE IN MODERATE TO STRENUOUS EXERCISE (LIKE A BRISK WALK)?: 1 DAY

## 2024-05-23 SDOH — HEALTH STABILITY: MENTAL HEALTH: ON AVERAGE, HOW MANY MINUTES DO YOU ENGAGE IN EXERCISE AT THIS LEVEL?: 10 MIN

## 2024-05-23 SDOH — SOCIAL STABILITY: SOCIAL INSECURITY: ARE YOU MARRIED, WIDOWED, DIVORCED, SEPARATED, NEVER MARRIED, OR LIVING WITH A PARTNER?: MARRIED

## 2024-05-23 SDOH — ECONOMIC STABILITY: TRANSPORTATION INSECURITY: IN THE PAST 12 MONTHS, HAS LACK OF TRANSPORTATION KEPT YOU FROM MEDICAL APPOINTMENTS OR FROM GETTING MEDICATIONS?: NO

## 2024-05-23 SDOH — ECONOMIC STABILITY: GENERAL: IN THE PAST 12 MONTHS HAS THE ELECTRIC, GAS, OIL, OR WATER COMPANY THREATENED TO SHUT OFF SERVICES IN YOUR HOME?: NO

## 2024-05-23 SDOH — ECONOMIC STABILITY: FOOD INSECURITY: WITHIN THE PAST 12 MONTHS, YOU WORRIED THAT YOUR FOOD WOULD RUN OUT BEFORE YOU GOT THE MONEY TO BUY MORE.: NEVER TRUE

## 2024-05-23 SDOH — SOCIAL STABILITY: SOCIAL INSECURITY
WITHIN THE LAST YEAR, HAVE YOU BEEN RAPED OR FORCED TO HAVE ANY KIND OF SEXUAL ACTIVITY BY YOUR PARTNER OR EX-PARTNER?: NO

## 2024-05-23 SDOH — ECONOMIC STABILITY: GENERAL
WHICH OF THE FOLLOWING DO YOU KNOW HOW TO USE AND HAVE ACCESS TO EVERY DAY? (CHOOSE ALL THAT APPLY): SMARTPHONE WITH CELLULAR DATA PLAN

## 2024-05-23 SDOH — SOCIAL STABILITY: SOCIAL NETWORK: IN A TYPICAL WEEK, HOW MANY TIMES DO YOU TALK ON THE PHONE WITH FAMILY, FRIENDS, OR NEIGHBORS?: THREE TIMES A WEEK

## 2024-05-23 SDOH — SOCIAL STABILITY: SOCIAL NETWORK: HOW OFTEN DO YOU ATTEND MEETINGS OF THE CLUBS OR ORGANIZATIONS YOU BELONG TO?: NEVER

## 2024-05-23 SDOH — HEALTH STABILITY: MENTAL HEALTH: HOW MANY DRINKS CONTAINING ALCOHOL DO YOU HAVE ON A TYPICAL DAY WHEN YOU ARE DRINKING?: PATIENT DOES NOT DRINK

## 2024-05-23 ASSESSMENT — LIFESTYLE VARIABLES
SKIP TO QUESTIONS 9-10: 1
AUDIT-C TOTAL SCORE: 0

## 2024-05-23 ASSESSMENT — ACTIVITIES OF DAILY LIVING (ADL)
LACK_OF_TRANSPORTATION: NO
LACK_OF_TRANSPORTATION: NO

## 2024-05-23 NOTE — PROGRESS NOTES
Contacted PT in regards to [facility] referral for resource assist: Spoke with him to gather info to connect him with needed resources, Will be contacting  [facility] to check medicaid ins status.      Discussed the following topics on behalf of the patient:  []  Behavioral Health Assistance     []  Case Management  []   Assistance  []  Digital Equity Assistance  []  Dental Health Assistance  []  Education Assistance  []  Employment Assistance  [x]  Financial Strain Relief Assistance  []  Food Insecurity Assistance  [x]  Healthcare Coverage Assistance  []  Housing Stability Assistance  []  IP Violence Relief Assistance  []  Legal Assistance  []  Physical Activity Assistance  []  Social Connection Assistance  []  Stress Relief Assistance   []  Substance Abuse Assistance  []  Transportation Assistance  []  Utility Assistance  []  Other: [insert comment here]    Next Steps:   Will be following up.    Shahnaz Jones LCSW

## 2024-05-23 NOTE — PROGRESS NOTES
Daily Call Note:     Daily call completed with the patient.  He states he is doing pretty good.  He states he didn't receive call from [employer], but then states he has a vm from yesterday which was around the time she called.  He will get back to me and let me know if that wasn't the message she left for him.  I also [facility] and get [facility].  Pt aware of upcoming appts.  No add'l questions or concerns during the call.      Pt Education:   Barriers:   Topics for Daily Review:   Pt demonstrates clear understanding:    Daily Weight:  There were no vitals filed for this visit.   Last 3 Weights:  Wt Readings from Last 7 Encounters:   05/13/24 80 kg (176 lb 5.9 oz)   05/13/24 80.3 kg (177 lb 0.5 oz)   05/04/24 71.4 kg (157 lb 6.5 oz)   04/26/24 73.7 kg (162 lb 7.7 oz)       Masimo Device:    Masimo Clinical Impression:     Virtual Visits--Scheduled (Most Recent Date at Top)  Follow up Appointments  Recent Visits  No visits were found meeting these conditions.  Showing recent visits within past 30 days and meeting all other requirements  Future Appointments  Date Type Provider Dept   05/29/24 Appointment DO Michell Thakkar [id number]   Showing future appointments within next 90 days and meeting all other requirements       Frequency of RN Calls & Virtual Visits per Team Agreement:     Medication issues Addressed (what was done):    Follow up appointments scheduled by [facility] Staff:   Referrals made by [facility] staff:

## 2024-05-24 ENCOUNTER — TELEMEDICINE (OUTPATIENT)
Dept: CARE COORDINATION | Age: 69
End: 2024-05-24

## 2024-05-24 ENCOUNTER — PATIENT OUTREACH (OUTPATIENT)
Dept: HOME HEALTH SERVICES | Age: 69
End: 2024-05-24

## 2024-05-24 ENCOUNTER — DOCUMENTATION (OUTPATIENT)
Dept: CARE COORDINATION | Age: 69
End: 2024-05-24

## 2024-05-24 DIAGNOSIS — I82.A12 ACUTE DEEP VEIN THROMBOSIS (DVT) OF AXILLARY VEIN OF LEFT UPPER EXTREMITY (MULTI): Primary | ICD-10-CM

## 2024-05-24 NOTE — PROGRESS NOTES
"   Brief summary of hospitalization or reason for referral  Admission Dx and Associated Referral Dx: Left arm DVT after EP procedure    Interval Subjective: Doing well and patient is elevating the hand.  Talked about doing some stress ball  is going to cover the cost of the drug..  No chest pain or shortness of breath.    Masimo: No  Oxygen: No     CPAP/BIPAP: No    Wt Readings from Last 3 Encounters:   05/13/24 80 kg (176 lb 5.9 oz)   05/13/24 80.3 kg (177 lb 0.5 oz)   05/04/24 71.4 kg (157 lb 6.5 oz)       Medications Issues/Refill need  Medication Follow up action needed: Cost of Eliquis    Requested Prescriptions      No prescriptions requested or ordered in this encounter       Upcoming Visits:  Recent Visits  No visits were found meeting these conditions.  Showing recent visits within past 30 days and meeting all other requirements  Today's Visits  Date Type Provider Dept   05/24/24 Appointment [facility]   Showing today's visits and meeting all other requirements  Future Appointments  Date Type Provider Dept   05/29/24 Appointment Cole Guadarrama DO [id number]   Showing future appointments within next 90 days and meeting all other requirements      Interval or Pertinent Labs/Testing:  Lab Review:   No results found for: \"HGBA1C\"    Admission on 05/13/2024, Discharged on 05/15/2024   Component Date Value    WBC 05/13/2024 7.9     nRBC 05/13/2024 0.0     RBC 05/13/2024 4.53     Hemoglobin 05/13/2024 13.7     Hematocrit 05/13/2024 42.4     MCV 05/13/2024 94     MCH 05/13/2024 30.2     MCHC 05/13/2024 32.3     RDW 05/13/2024 12.7     Platelets 05/13/2024 525 (H)     Neutrophils % 05/13/2024 54.1     Immature Granulocytes %,* 05/13/2024 0.5     Lymphocytes % 05/13/2024 31.2     Monocytes % 05/13/2024 11.6     Eosinophils % 05/13/2024 2.0     Basophils % 05/13/2024 0.6     Neutrophils Absolute 05/13/2024 4.24     Immature Granulocytes Ab* 05/13/2024 0.04     " Lymphocytes Absolute 05/13/2024 2.45     Monocytes Absolute 05/13/2024 0.91     Eosinophils Absolute 05/13/2024 0.16     Basophils Absolute 05/13/2024 0.05     Glucose 05/13/2024 80     Sodium 05/13/2024 140     Potassium 05/13/2024 4.5     Chloride 05/13/2024 102     Bicarbonate 05/13/2024 29     Anion Gap 05/13/2024 14     Urea Nitrogen 05/13/2024 17     Creatinine 05/13/2024 1.00     eGFR 05/13/2024 82     Calcium 05/13/2024 9.5     Albumin 05/13/2024 4.0     Alkaline Phosphatase 05/13/2024 127     Total Protein 05/13/2024 7.3     AST 05/13/2024 32     Bilirubin, Total 05/13/2024 0.3     ALT 05/13/2024 75 (H)     Magnesium 05/13/2024 2.00     Phosphorus 05/13/2024 3.9     Ventricular Rate 05/13/2024 73     Atrial Rate 05/13/2024 73     MI Interval 05/13/2024 236     QRS Duration 05/13/2024 166     QT Interval 05/13/2024 424     QTC Calculation(Bazett) 05/13/2024 467     P Axis 05/13/2024 74     R [place] 05/13/2024 -89     T Axis 05/13/2024 51     QRS Count 05/13/2024 12     Q Onset 05/13/2024 227     P Onset 05/13/2024 109     P Offset 05/13/2024 170     T Offset 05/13/2024 439     QTC Fredericia 05/13/2024 452     WBC 05/14/2024 7.4     nRBC 05/14/2024 0.0     RBC 05/14/2024 4.30 (L)     Hemoglobin 05/14/2024 13.0 (L)     Hematocrit 05/14/2024 40.6 (L)     MCV 05/14/2024 94     MCH 05/14/2024 30.2     MCHC 05/14/2024 32.0     RDW 05/14/2024 12.8     Platelets 05/14/2024 459 (H)     Glucose 05/14/2024 86     Sodium 05/14/2024 138     Potassium 05/14/2024 4.3     Chloride 05/14/2024 102     Bicarbonate 05/14/2024 29     Anion Gap 05/14/2024 11     Urea Nitrogen 05/14/2024 18     Creatinine 05/14/2024 0.95     eGFR 05/14/2024 88     Calcium 05/14/2024 9.2     Phosphorus 05/14/2024 4.8     Albumin 05/14/2024 3.7     Heparin Unfractionated 05/13/2024 0.5     Heparin Unfractionated 05/14/2024 0.4     WBC 05/15/2024 7.2     nRBC 05/15/2024 0.0     RBC 05/15/2024 4.24 (L)     Hemoglobin 05/15/2024 12.9 (L)     Hematocrit  05/15/2024 40.2 (L)     MCV 05/15/2024 95     MCH 05/15/2024 30.4     MCHC 05/15/2024 32.1     RDW 05/15/2024 12.8     Platelets 05/15/2024 468 (H)     Heparin Unfractionated 05/15/2024 0.4     Glucose 05/15/2024 117 (H)     Sodium 05/15/2024 136     Potassium 05/15/2024 3.8     Chloride 05/15/2024 100     Bicarbonate 05/15/2024 29     Anion Gap 05/15/2024 11     Urea Nitrogen 05/15/2024 13     Creatinine 05/15/2024 0.89     eGFR 05/15/2024 >90     Calcium 05/15/2024 9.2     Albumin 05/15/2024 3.7     Alkaline Phosphatase 05/15/2024 112     Total Protein 05/15/2024 6.6     AST 05/15/2024 20     Bilirubin, Total 05/15/2024 0.3     ALT 05/15/2024 51     WBC 05/15/2024 7.0     nRBC 05/15/2024 0.0     RBC 05/15/2024 4.42 (L)     Hemoglobin 05/15/2024 13.2 (L)     Hematocrit 05/15/2024 41.1     MCV 05/15/2024 93     MCH 05/15/2024 29.9     MCHC 05/15/2024 32.1     RDW 05/15/2024 12.7     Platelets 05/15/2024 470 (H)     Neutrophils % 05/15/2024 50.7     Immature Granulocytes %,* 05/15/2024 0.9     Lymphocytes % 05/15/2024 35.8     Monocytes % 05/15/2024 9.8     Eosinophils % 05/15/2024 2.2     Basophils % 05/15/2024 0.6     Neutrophils Absolute 05/15/2024 3.54     Immature Granulocytes Ab* 05/15/2024 0.06     Lymphocytes Absolute 05/15/2024 2.49     Monocytes Absolute 05/15/2024 0.68     Eosinophils Absolute 05/15/2024 0.15     Basophils Absolute 05/15/2024 0.04    Office Visit on 05/13/2024   Component Date Value    Ventricular Rate 05/13/2024 69     Atrial Rate 05/13/2024 69     VA Interval 05/13/2024 220     QRS Duration 05/13/2024 176     QT Interval 05/13/2024 424     QTC Calculation(Bazett) 05/13/2024 454     P Axis 05/13/2024 71     R [place] 05/13/2024 269     T Axis 05/13/2024 56     QRS Count 05/13/2024 11     Q Onset 05/13/2024 227     P Onset 05/13/2024 117     P Offset 05/13/2024 177     T Offset 05/13/2024 439     QTC Fredericia 05/13/2024 444    Admission on 05/02/2024, Discharged on 05/04/2024   Component  Date Value    Ventricular Rate 05/02/2024 65     Atrial Rate 05/02/2024 65     FL Interval 05/02/2024 218     QRS Duration 05/02/2024 176     QT Interval 05/02/2024 420     QTC Calculation(Bazett) 05/02/2024 436     P Axis 05/02/2024 72     R Axis 05/02/2024 -89     T Axis 05/02/2024 55     QRS Count 05/02/2024 10     Q Onset 05/02/2024 228     P Onset 05/02/2024 119     P Offset 05/02/2024 176     T Offset 05/02/2024 438     QTC Fredericia 05/02/2024 431     WBC 05/02/2024 8.9     nRBC 05/02/2024 0.0     RBC 05/02/2024 4.45 (L)     Hemoglobin 05/02/2024 13.4 (L)     Hematocrit 05/02/2024 40.3 (L)     MCV 05/02/2024 91     MCH 05/02/2024 30.1     MCHC 05/02/2024 33.3     RDW 05/02/2024 12.7     Platelets 05/02/2024 278     Neutrophils % 05/02/2024 63.3     Immature Granulocytes %,* 05/02/2024 0.4     Lymphocytes % 05/02/2024 24.0     Monocytes % 05/02/2024 10.5     Eosinophils % 05/02/2024 1.1     Basophils % 05/02/2024 0.7     Neutrophils Absolute 05/02/2024 5.63     Immature Granulocytes Ab* 05/02/2024 0.04     Lymphocytes Absolute 05/02/2024 2.13     Monocytes Absolute 05/02/2024 0.93     Eosinophils Absolute 05/02/2024 0.10     Basophils Absolute 05/02/2024 0.06     Glucose 05/02/2024 75     Sodium 05/02/2024 137     Potassium 05/02/2024 4.3     Chloride 05/02/2024 100     Bicarbonate 05/02/2024 29     Anion Gap 05/02/2024 12     Urea Nitrogen 05/02/2024 15     Creatinine 05/02/2024 0.82     eGFR 05/02/2024 >90     Calcium 05/02/2024 9.3     Albumin 05/02/2024 3.9     Alkaline Phosphatase 05/02/2024 91     Total Protein 05/02/2024 6.6     AST 05/02/2024 18     Bilirubin, Total 05/02/2024 0.3     ALT 05/02/2024 30     Magnesium 05/02/2024 1.90     Phosphorus 05/02/2024 3.5     WBC 05/03/2024 8.0     nRBC 05/03/2024 0.0     RBC 05/03/2024 4.59     Hemoglobin 05/03/2024 13.9     Hematocrit 05/03/2024 42.3     MCV 05/03/2024 92     MCH 05/03/2024 30.3     MCHC 05/03/2024 32.9     RDW 05/03/2024 12.6     Platelets  05/03/2024 271     Glucose 05/03/2024 88     Sodium 05/03/2024 138     Potassium 05/03/2024 4.4     Chloride 05/03/2024 98     Bicarbonate 05/03/2024 31     Anion Gap 05/03/2024 13     Urea Nitrogen 05/03/2024 14     Creatinine 05/03/2024 1.00     eGFR 05/03/2024 82     Calcium 05/03/2024 9.5     Albumin 05/03/2024 4.0     Alkaline Phosphatase 05/03/2024 97     Total Protein 05/03/2024 6.7     AST 05/03/2024 20     Bilirubin, Total 05/03/2024 0.5     ALT 05/03/2024 33     Keppra 05/03/2024 7 (L)     POCT Glucose 05/03/2024 86    Admission on 04/24/2024, Discharged on 04/26/2024   Component Date Value    WBC 04/24/2024 12.3 (H)     nRBC 04/24/2024 0.0     RBC 04/24/2024 5.39     Hemoglobin 04/24/2024 16.5     Hematocrit 04/24/2024 51.5     MCV 04/24/2024 96     MCH 04/24/2024 30.6     MCHC 04/24/2024 32.0     RDW 04/24/2024 12.9     Platelets 04/24/2024 405     Neutrophils % 04/24/2024 77.1     Immature Granulocytes %,* 04/24/2024 0.5     Lymphocytes % 04/24/2024 17.0     Monocytes % 04/24/2024 4.9     Eosinophils % 04/24/2024 0.1     Basophils % 04/24/2024 0.4     Neutrophils Absolute 04/24/2024 9.47 (H)     Immature Granulocytes Ab* 04/24/2024 0.06     Lymphocytes Absolute 04/24/2024 2.09     Monocytes Absolute 04/24/2024 0.60     Eosinophils Absolute 04/24/2024 0.01     Basophils Absolute 04/24/2024 0.05     Glucose 04/24/2024 185 (H)     Sodium 04/24/2024 138     Potassium 04/24/2024 4.2     Chloride 04/24/2024 98     Bicarbonate 04/24/2024 22     Anion Gap 04/24/2024 22 (H)     Urea Nitrogen 04/24/2024 22     Creatinine 04/24/2024 1.97 (H)     eGFR 04/24/2024 37 (L)     Calcium 04/24/2024 9.8     Albumin 04/24/2024 4.4     Alkaline Phosphatase 04/24/2024 116     Total Protein 04/24/2024 7.3     AST 04/24/2024 65 (H)     Bilirubin, Total 04/24/2024 0.5     ALT 04/24/2024 143 (H)     Magnesium 04/24/2024 1.91     POCT pH, Venous 04/24/2024 7.20 (LL)     POCT pCO2, Venous 04/24/2024 51     POCT pO2, Venous  04/24/2024 18 (L)     POCT SO2, Venous 04/24/2024 20 (L)     POCT Oxy Hemoglobin, Andrzej* 04/24/2024 20.1 (L)     POCT Base Excess, Venous 04/24/2024 -8.4 (L)     POCT HCO3 Calculated, Ve* 04/24/2024 19.9 (L)     Patient Temperature 04/24/2024      FiO2 04/24/2024 95     Troponin I, High Sensiti* 04/24/2024 29 (H)     Keppra 04/24/2024 20     Troponin I, High Sensiti* 04/24/2024 32 (H)     AV pk bianca 04/24/2024 1.88     AV mn grad 04/24/2024 7.3     LVOT diam 04/24/2024 2.01     LV Biplane EF 04/24/2024 58     MV avg E/e' ratio 04/24/2024 7.43     MV E/A ratio 04/24/2024 1.43     LA vol index A/L 04/24/2024 23.5     Tricuspid annular plane * 04/24/2024 2.7     RV free wall pk S' 04/24/2024 15.00     LVIDd 04/24/2024 4.33     RVSP 04/24/2024 25.0     Aortic Valve Area by Con* 04/24/2024 2.15     Aortic Valve Area by Con* 04/24/2024 2.44     AV pk grad 04/24/2024 14.2     LV A4C EF 04/24/2024 54.6     Staph/MRSA Screen Culture 04/24/2024 No Staphylococcus aureus isolated     WBC 04/25/2024 12.9 (H)     nRBC 04/25/2024 0.0     RBC 04/25/2024 4.78     Hemoglobin 04/25/2024 14.4     Hematocrit 04/25/2024 44.8     MCV 04/25/2024 94     MCH 04/25/2024 30.1     MCHC 04/25/2024 32.1     RDW 04/25/2024 13.0     Platelets 04/25/2024 281     Glucose 04/25/2024 82     Sodium 04/25/2024 138     Potassium 04/25/2024 3.9     Chloride 04/25/2024 104     Bicarbonate 04/25/2024 28     Anion Gap 04/25/2024 10     Urea Nitrogen 04/25/2024 19     Creatinine 04/25/2024 1.15     eGFR 04/25/2024 70     Calcium 04/25/2024 8.6     Phosphorus 04/25/2024 3.1     Albumin 04/25/2024 3.6     Magnesium 04/25/2024 1.78     Ventricular Rate 04/24/2024 79     Atrial Rate 04/24/2024 78     QRS Duration 04/24/2024 202     QT Interval 04/24/2024 484     QTC Calculation(Bazett) 04/24/2024 554     P Axis 04/24/2024 -18     R Axis 04/24/2024 126     T Axis 04/24/2024 -45     QRS Count 04/24/2024 13     Q Onset 04/24/2024 183     T Offset 04/24/2024 425     QTC  Fredericia 04/24/2024 530     POCT Glucose 04/25/2024 169 (H)     WBC 04/26/2024 10.9     nRBC 04/26/2024 0.0     RBC 04/26/2024 4.67     Hemoglobin 04/26/2024 14.2     Hematocrit 04/26/2024 43.6     MCV 04/26/2024 93     MCH 04/26/2024 30.4     MCHC 04/26/2024 32.6     RDW 04/26/2024 12.7     Platelets 04/26/2024 262     Glucose 04/26/2024 118 (H)     Sodium 04/26/2024 133 (L)     Potassium 04/26/2024 3.6     Chloride 04/26/2024 98     Bicarbonate 04/26/2024 29     Anion Gap 04/26/2024 10     Urea Nitrogen 04/26/2024 14     Creatinine 04/26/2024 0.94     eGFR 04/26/2024 89     Calcium 04/26/2024 8.6     Phosphorus 04/26/2024 2.2 (L)     Albumin 04/26/2024 3.6     Magnesium 04/26/2024 1.50 (L)     POCT Glucose 04/25/2024 92     POCT Glucose 04/26/2024 89     POCT Glucose 04/26/2024 112 (H)         SDOH Concerns & Interventions: Cost of Eliquis will be covered by manufacture    Assessment/Plan  Acute left upper extremity DVT  Continue with Eliquis  Currently plan for 3 months of anticoagulation  Patient completed 10 mg twice a day and now on 5 mg twice a day  Follow-up with cardiology or vascular medicine    If patient is doing well next week potentially graduate him sooner at 3-week follow-up.    This visit took place over the phone with patient, RN Brittany, pharmacist Syd and myself.    Quinten Salas MD.

## 2024-05-24 NOTE — PROGRESS NOTES
Daily Call Note:   [facility] call completed with Dr. Salas and Syd from pharmacy. Patient states feeling good and has had no problems. He denies CP. States mild SOB every now and then. States arm swelling has improved over the last 2 days. Patient states has been elevating his arm as much as possible and Dr. Salas discussed patient using a stress back to help reduce swelling as well. Patient states taking Eliquis and denies any bleeding. States stools are dark brown, he has some bruising (improving) to his right arm PICC site, he denies any hematuria. Patient advised to monitor for changes in stool color (black, tarry, etc.) and report any changes. Patient states Eliquis was approved by [employer] and should be shipped out by next Friday. Upcoming appointments reviewed and next [facility] scheduled for 5/31 @ 0900.   Pt Education: Yes  Barriers: None  Topics for Daily Review: Elevation and stress ball to reduce swelling  Pt demonstrates clear understanding: Yes    Daily Weight:  There were no vitals filed for this visit.   Last 3 Weights:  Wt Readings from Last 7 Encounters:   05/13/24 80 kg (176 lb 5.9 oz)   05/13/24 80.3 kg (177 lb 0.5 oz)   05/04/24 71.4 kg (157 lb 6.5 oz)   04/26/24 73.7 kg (162 lb 7.7 oz)       Masimo Device: No   Masimo Clinical Impression: N/A    Virtual Visits--Scheduled (Most Recent Date at Top)  Follow up Appointments  Recent Visits  No visits were found meeting these conditions.  Showing recent visits within past 30 days and meeting all other requirements  Future Appointments  Date Type Provider Dept   05/29/24 Appointment Cole Guadarrama DO [id number]   Showing future appointments within next 90 days and meeting all other requirements       Frequency of RN Calls & Virtual Visits per Team Agreement: [facility] Frequency: Daily    Medication issues Addressed (what was done): None    Follow up appointments scheduled by [facility] Staff: None  Referrals made by [facility] staff: None

## 2024-05-24 NOTE — PROGRESS NOTES
Spoke with HRS in regards to the PT ins application. Talk to Elvira, she stated that his ins is currently pending and it could take up to 30 days. Contacted the PT to share the info, also connected him with resources for financial assist.     Discussed the following topics on behalf of the patient:  []  Behavioral Health Assistance     []  Case Management  []   Assistance  []  Digital Equity Assistance  []  Dental Health Assistance  []  Education Assistance  []  Employment Assistance  [x]  Financial Strain Relief Assistance  []  Food Insecurity Assistance  [x]  Healthcare Coverage Assistance  []  Housing Stability Assistance  []  IP Violence Relief Assistance  []  Legal Assistance  []  Physical Activity Assistance  []  Social Connection Assistance  []  Stress Relief Assistance   []  Substance Abuse Assistance  []  Transportation Assistance  []  Utility Assistance  []  Other: [insert comment here]        Shahnaz Jones LCSW

## 2024-05-25 ENCOUNTER — PATIENT OUTREACH (OUTPATIENT)
Dept: HOME HEALTH SERVICES | Age: 69
End: 2024-05-25

## 2024-05-25 NOTE — PROGRESS NOTES
Daily call completed, pt had  bad reception so phone kept hanging up, but otherwise pt is doing good. No c/o sob except sometimes with activities and/or smoking, edema, cp. Pt said swelling has gone done in left arm, no issues with numbness or tingling. We talk about some options for quitting smoking. Pt seems like he not ready, pt state life a little stressful. Will continue to encourage pt to stop smoking.  No other concerns.

## 2024-05-28 ENCOUNTER — HOSPITAL ENCOUNTER (OUTPATIENT)
Dept: CARDIOLOGY | Facility: HOSPITAL | Age: 69
Discharge: HOME | End: 2024-05-28

## 2024-05-28 ENCOUNTER — PATIENT OUTREACH (OUTPATIENT)
Dept: CARE COORDINATION | Age: 69
End: 2024-05-28

## 2024-05-28 DIAGNOSIS — I44.2 CHB (COMPLETE HEART BLOCK) (MULTI): ICD-10-CM

## 2024-05-28 PROCEDURE — 93280 PM DEVICE PROGR EVAL DUAL: CPT

## 2024-05-28 PROCEDURE — 93280 PM DEVICE PROGR EVAL DUAL: CPT | Performed by: NURSE PRACTITIONER

## 2024-05-28 NOTE — PROGRESS NOTES
Daily Call Note:   Pt stated he was doing well Denies any symptoms in left arm. Some slight swelling when he doesn't elevate he stated. Talked about how this will help. Denies any redness numbness, tingling, or pain on affected arm. Denies SOB. Pt at cardiology device clinic today, no more questions or concerns. [facility] 5/31.    Pt demonstrates clear understanding: Yes    Daily Weight:  There were no vitals filed for this visit.   Last 3 Weights:  Wt Readings from Last 7 Encounters:   05/13/24 80 kg (176 lb 5.9 oz)   05/13/24 80.3 kg (177 lb 0.5 oz)   05/04/24 71.4 kg (157 lb 6.5 oz)   04/26/24 73.7 kg (162 lb 7.7 oz)       Masimo Device: No     Virtual Visits--Scheduled (Most Recent Date at Top)  Follow up Appointments  Recent Visits  Date Type Provider Dept   05/24/24 Telemedicine Quinten Salas MD [facility]   Showing recent visits within past 30 days and meeting all other requirements  Future Appointments  Date Type Provider Dept   05/29/24 Appointment Cole Guadarrama DO [id number]   05/31/24 Appointment [facility]   Showing future appointments within next 90 days and meeting all other requirements       Frequency of RN Calls & Virtual Visits per Team Agreement: [facility] Frequency: Bi-Weekly    Medication issues Addressed (what was done):     Follow up appointments scheduled by [facility] Staff: 5/31@0900

## 2024-05-29 ENCOUNTER — OFFICE VISIT (OUTPATIENT)
Dept: PRIMARY CARE | Facility: CLINIC | Age: 69
End: 2024-05-29

## 2024-05-29 VITALS
OXYGEN SATURATION: 96 % | DIASTOLIC BLOOD PRESSURE: 76 MMHG | HEART RATE: 72 BPM | SYSTOLIC BLOOD PRESSURE: 140 MMHG | HEIGHT: 67 IN | TEMPERATURE: 97.3 F | RESPIRATION RATE: 16 BRPM | WEIGHT: 173 LBS | BODY MASS INDEX: 27.15 KG/M2

## 2024-05-29 DIAGNOSIS — R97.20 ELEVATED PSA: ICD-10-CM

## 2024-05-29 DIAGNOSIS — Z95.0 PACEMAKER: Primary | ICD-10-CM

## 2024-05-29 DIAGNOSIS — I82.A12 ACUTE DEEP VEIN THROMBOSIS (DVT) OF AXILLARY VEIN OF LEFT UPPER EXTREMITY (MULTI): ICD-10-CM

## 2024-05-29 DIAGNOSIS — I44.2 COMPLETE HEART BLOCK (MULTI): ICD-10-CM

## 2024-05-29 DIAGNOSIS — R56.9 SEIZURES (MULTI): ICD-10-CM

## 2024-05-29 PROCEDURE — 1111F DSCHRG MED/CURRENT MED MERGE: CPT | Performed by: FAMILY MEDICINE

## 2024-05-29 PROCEDURE — 99496 TRANSJ CARE MGMT HIGH F2F 7D: CPT | Performed by: FAMILY MEDICINE

## 2024-05-29 PROCEDURE — 1124F ACP DISCUSS-NO DSCNMKR DOCD: CPT | Performed by: FAMILY MEDICINE

## 2024-05-29 PROCEDURE — 1159F MED LIST DOCD IN RCRD: CPT | Performed by: FAMILY MEDICINE

## 2024-05-29 PROCEDURE — 1126F AMNT PAIN NOTED NONE PRSNT: CPT | Performed by: FAMILY MEDICINE

## 2024-05-29 ASSESSMENT — COLUMBIA-SUICIDE SEVERITY RATING SCALE - C-SSRS
1. IN THE PAST MONTH, HAVE YOU WISHED YOU WERE DEAD OR WISHED YOU COULD GO TO SLEEP AND NOT WAKE UP?: NO
2. HAVE YOU ACTUALLY HAD ANY THOUGHTS OF KILLING YOURSELF?: NO
6. HAVE YOU EVER DONE ANYTHING, STARTED TO DO ANYTHING, OR PREPARED TO DO ANYTHING TO END YOUR LIFE?: NO

## 2024-05-29 ASSESSMENT — PATIENT HEALTH QUESTIONNAIRE - PHQ9
1. LITTLE INTEREST OR PLEASURE IN DOING THINGS: NOT AT ALL
2. FEELING DOWN, DEPRESSED OR HOPELESS: NOT AT ALL
SUM OF ALL RESPONSES TO PHQ9 QUESTIONS 1 AND 2: 0

## 2024-05-29 ASSESSMENT — PAIN SCALES - GENERAL: PAINLEVEL: 0-NO PAIN

## 2024-05-29 ASSESSMENT — ENCOUNTER SYMPTOMS
OCCASIONAL FEELINGS OF UNSTEADINESS: 0
DEPRESSION: 0
LOSS OF SENSATION IN FEET: 0

## 2024-05-29 NOTE — PROGRESS NOTES
"Subjective   Patient ID: Deonte Leavitt is a 69 y.o. male who presents for New Patient Visit and Hospital Follow-up (Pt is here to establish care and also for a hospital follow up for seizures and reports having a pacemaker placed. Pt also reports post procedural blood clots.  ).    HPI pt admitted 5/13-5/15.  Hx of seizure disorder/tobacco abuse/complete heart block with pacer placement on 4/125/24).  He had presented with left upper extremity swelling after new lead implant placement and resultant US showing acute DVT involving his left upper extremity.  Placed on Eliquis and discharged.    He continues to smoke    He also had elevated psa at 25 2 years ago and had seen Dr Avila for urology eval but refused treatment/eval at that time.      Review of Systems see HPI    Objective   /76 (BP Location: Right arm, Patient Position: Sitting, BP Cuff Size: Adult)   Pulse 72   Temp 36.3 °C (97.3 °F) (Temporal)   Resp 16   Ht 1.702 m (5' 7\")   Wt 78.5 kg (173 lb)   SpO2 96%   BMI 27.10 kg/m²     Physical Exam  Constitutional:       General: He is not in acute distress.     Appearance: Normal appearance.   Cardiovascular:      Rate and Rhythm: Normal rate and regular rhythm.      Heart sounds: No murmur heard.  Pulmonary:      Breath sounds: Normal breath sounds. No wheezing.   Neurological:      Mental Status: He is alert.         Assessment/Plan   Problem List Items Addressed This Visit             ICD-10-CM    Pacemaker - Primary Z95.0    Complete heart block (Multi) I44.2    Seizures (Multi) R56.9    Acute deep vein thrombosis (DVT) of axillary vein of left upper extremity (Multi) I82.A12     Other Visit Diagnoses         Codes    Elevated PSA     R97.20        Reviewed hospital records and imaging.  Stressed importance of smoking cessation and increased clotting risk among other things.  Also discussed importance of follow up of psa elevation as this may represent cancer and he refuses recheck psa or " referral for urology currently.  Follow up with specialist for recheck DVT and cardiology as scheduled.  Follow up for full PE and regular preventive care near future.

## 2024-05-30 ENCOUNTER — PATIENT OUTREACH (OUTPATIENT)
Dept: CARE COORDINATION | Age: 69
End: 2024-05-30

## 2024-05-30 VITALS
HEART RATE: 83 BPM | WEIGHT: 170.8 LBS | OXYGEN SATURATION: 95 % | DIASTOLIC BLOOD PRESSURE: 71 MMHG | BODY MASS INDEX: 26.75 KG/M2 | SYSTOLIC BLOOD PRESSURE: 119 MMHG

## 2024-05-30 NOTE — PROGRESS NOTES
Daily Call Note:   Pt stated he is feeling better, less SOB, almost normal he stated. Left arm that he had a DVT in feels better. Pt stated he has minimal swelling, just when he doesn't elevate. Denies any redness numbness, tingling, or pain on affected arm. Denies SOB. Pt had PCP appt yesterday he stated. Discussed smoking cessation, stated he is not sure if he is ready. No more questions or concerns. [facility] 5/31. Pt aware of appt.    Topics for Daily Review: VS,   Pt demonstrates clear understanding: Yes    Daily VS:  /71   Pulse 83   Wt 77.5 kg (170 lb 12.8 oz)   SpO2 95%   BMI 26.75 kg/m²            Last 3 Weights:  Wt Readings from Last 7 Encounters:   05/29/24 78.5 kg (173 lb)   05/13/24 80 kg (176 lb 5.9 oz)   05/13/24 80.3 kg (177 lb 0.5 oz)   05/04/24 71.4 kg (157 lb 6.5 oz)   04/26/24 73.7 kg (162 lb 7.7 oz)       Masimo Device: No       Virtual Visits--Scheduled (Most Recent Date at Top)  Follow up Appointments  Recent Visits  Date Type Provider Dept   05/29/24 Office Visit Cole Guadarrama DO [id number]   05/24/24 Telemedicine Quinten Salas MD [facility]   Showing recent visits within past 30 days and meeting all other requirements  Future Appointments  Date Type Provider Dept   05/31/24 Appointment [facility]   Showing future appointments within next 90 days and meeting all other requirements       Frequency of RN Calls & Virtual Visits per Team Agreement: [facility] Frequency: Daily    Follow up appointments scheduled by [facility] Staff: 5/31 @0900

## 2024-05-31 ENCOUNTER — TELEPHONE (OUTPATIENT)
Dept: HOME HEALTH SERVICES | Age: 69
End: 2024-05-31

## 2024-05-31 ENCOUNTER — PATIENT OUTREACH (OUTPATIENT)
Dept: HOME HEALTH SERVICES | Age: 69
End: 2024-05-31

## 2024-05-31 ENCOUNTER — APPOINTMENT (OUTPATIENT)
Dept: CARE COORDINATION | Age: 69
End: 2024-05-31

## 2024-05-31 VITALS
BODY MASS INDEX: 26.53 KG/M2 | WEIGHT: 169.4 LBS | DIASTOLIC BLOOD PRESSURE: 71 MMHG | OXYGEN SATURATION: 94 % | SYSTOLIC BLOOD PRESSURE: 130 MMHG | HEART RATE: 65 BPM

## 2024-05-31 NOTE — PROGRESS NOTES
"Weekly hhvc completed with zohra tate, pt and RN. Pt states he is doing well. Received his eliquis yesterday (free). Pt sees cardiology next week. Pt states his swelling has gone down considerably. His hand has gone back down to normal but intermittently swells \"a little\". Zohra informed pt the swelling may happen over time as the clot is resolving. Pt states he has started checking his vitals. Denies SOB/CP/palpitations. Pt states he gets minimal shortness of breath with exertion (taking garbage cans out to end of driveway, etc) but that it has been improving. Zohra encouraged pt to inform cardiology about the shortness of breath with exertion. Pt verbalizes understanding. Graduation addressed with pt. All parties agreeable. Pt informed he is able to call in for any future questions/concerns. Verbalizes understanding. Denies any further questions/concerns/needs at this time. Aware of upcoming appts. Pt graduated from program at this time.     Weight 169.4lb  /71  HR 65  O2 94%        Patient's Address:   [address] [place] [address] [place] [address]  **  If this is not the address patient will receive services - alert team and address in EMR**       Patient Contacts:  Extended Emergency Contact Information  Primary Emergency Contact: AMAYA RAO  Address: [address] [place] [address] [place] [address] [facility]  Home Phone: [phone]  Work Phone: [phone]  Mobile Phone: [phone]  Relation: Spouse                                Patient's Preferred Phone: [phone]  Patient's E-mail: [email or web address]                                      "

## 2024-05-31 NOTE — NURSING NOTE
Patient called in he had graduated from the program today but called in because he had an episode today where he felt weak and sweaty he has had episodes like this in the past he was told by PCP he should eat small frequent meals and it had been a while since he ate BP at the time 152/72 HR 77 POX 92% he states he did eat something and he is on a monitor for his pacemaker did push the button on his device but it was about 30 mins after episode he did check his BP a few times and HR POX over the next few hours he states he did get a couple lower BP numbers he has an appointment with Cardiology Monday 6/3 confirmed he has his meds and is taking them advised to discus episode during his visit on Monday

## 2024-06-03 ENCOUNTER — OFFICE VISIT (OUTPATIENT)
Dept: CARDIOLOGY | Facility: HOSPITAL | Age: 69
End: 2024-06-03

## 2024-06-03 VITALS
WEIGHT: 175 LBS | DIASTOLIC BLOOD PRESSURE: 84 MMHG | HEART RATE: 72 BPM | BODY MASS INDEX: 27.41 KG/M2 | SYSTOLIC BLOOD PRESSURE: 138 MMHG | OXYGEN SATURATION: 94 %

## 2024-06-03 DIAGNOSIS — Z95.0 PACEMAKER: ICD-10-CM

## 2024-06-03 LAB
ATRIAL RATE: 69 BPM
P AXIS: 73 DEGREES
P OFFSET: 181 MS
P ONSET: 117 MS
PR INTERVAL: 220 MS
Q ONSET: 227 MS
QRS COUNT: 11 BEATS
QRS DURATION: 178 MS
QT INTERVAL: 430 MS
QTC CALCULATION(BAZETT): 460 MS
QTC FREDERICIA: 450 MS
R AXIS: 270 DEGREES
T AXIS: 50 DEGREES
T OFFSET: 442 MS
VENTRICULAR RATE: 69 BPM

## 2024-06-03 PROCEDURE — 1111F DSCHRG MED/CURRENT MED MERGE: CPT | Performed by: STUDENT IN AN ORGANIZED HEALTH CARE EDUCATION/TRAINING PROGRAM

## 2024-06-03 PROCEDURE — 1159F MED LIST DOCD IN RCRD: CPT | Performed by: STUDENT IN AN ORGANIZED HEALTH CARE EDUCATION/TRAINING PROGRAM

## 2024-06-03 PROCEDURE — 99214 OFFICE O/P EST MOD 30 MIN: CPT | Performed by: STUDENT IN AN ORGANIZED HEALTH CARE EDUCATION/TRAINING PROGRAM

## 2024-06-03 PROCEDURE — 93005 ELECTROCARDIOGRAM TRACING: CPT | Performed by: STUDENT IN AN ORGANIZED HEALTH CARE EDUCATION/TRAINING PROGRAM

## 2024-06-03 PROCEDURE — 1123F ACP DISCUSS/DSCN MKR DOCD: CPT | Performed by: STUDENT IN AN ORGANIZED HEALTH CARE EDUCATION/TRAINING PROGRAM

## 2024-06-03 RX ORDER — TAMSULOSIN HYDROCHLORIDE 0.4 MG/1
0.4 CAPSULE ORAL DAILY
COMMUNITY

## 2024-06-03 RX ORDER — ROSUVASTATIN CALCIUM 10 MG/1
10 TABLET, COATED ORAL DAILY
COMMUNITY

## 2024-06-03 ASSESSMENT — ENCOUNTER SYMPTOMS
FEVER: 0
SHORTNESS OF BREATH: 0
CONFUSION: 0
DIZZINESS: 0

## 2024-06-03 NOTE — PROGRESS NOTES
Chief Complaint:   No chief complaint on file.     History Of Present Illness:    Deonte Leavitt is a 69 y.o. male with complete heart block s/p PPM ([facility] - 04/25/2024) followed by RV lead malfunction requiring RV lead removal and implant of new RV lead (05/03/2024). Patient also has a history of seizures on Keppra.    After discharged from his procedure on 05/03/2024 (see above), patient developed left arm edema. Patient had an upper extremity venous duplex, which was positive for acute DVT involving his left Subclavian, Axillary, Brachial, Cephalic & Basilic veins.   Patient was admitted (05/13), was treated with heparin and discharged on Eliquis. He has been doing well, no more signs of left arm edema.     Last Recorded Vitals:  Vitals:    06/03/24 0815   BP: 138/84   Pulse: 72   SpO2: 94%   Weight: 79.4 kg (175 lb)       Past Medical History:  He has a past medical history of Seizures (Multi).    Past Surgical History:  He has a past surgical history that includes Cardiac electrophysiology procedure (N/A, 4/24/2024); Cardiac electrophysiology procedure (N/A, 4/25/2024); and Cardiac electrophysiology procedure (Left, 5/3/2024).      Social History:  He reports that he has been smoking cigarettes. He has never used smokeless tobacco. He reports that he does not currently use alcohol. He reports current drug use. Drug: Marijuana.    Family History:  No family history on file.     Allergies:  Patient has no known allergies.    Outpatient Medications:  Current Outpatient Medications   Medication Instructions    apixaban (Eliquis) 5 mg (74 tabs) tablet Take 2 tablets (10 mg) by mouth 2 times a day for 7 days, then take 1 tablet (5 mg) by mouth 2 times a day.    lamoTRIgine (LAMICTAL) 50 mg, oral, 2 times daily, Per pt, cut his dose down without speaking with provider    levETIRAcetam (KEPPRA) 500 mg, oral, 2 times daily    rosuvastatin (CRESTOR) 10 mg, oral, Daily    tamsulosin (FLOMAX) 0.4 mg, oral, Daily  "      Review of Systems   Constitutional:  Negative for fever.   Respiratory:  Negative for shortness of breath.    Cardiovascular:         As per history.   Neurological:  Negative for dizziness and syncope.   Psychiatric/Behavioral:  Negative for confusion.       Physical Exam  Constitutional:       Appearance: Normal appearance.   Cardiovascular:      Rate and Rhythm: Normal rate and regular rhythm.      Heart sounds: No murmur heard.     No friction rub. No gallop.   Pulmonary:      Effort: Pulmonary effort is normal.      Breath sounds: Normal breath sounds.   Abdominal:      Palpations: Abdomen is soft.   Musculoskeletal:      Cervical back: Neck supple.   Neurological:      Mental Status: He is alert.   Psychiatric:         Mood and Affect: Mood normal.         Behavior: Behavior normal.           Last Labs:  CBC -  Lab Results   Component Value Date    WBC 7.0 05/15/2024    HGB 13.2 (L) 05/15/2024    HCT 41.1 05/15/2024    MCV 93 05/15/2024     (H) 05/15/2024       CMP -  Lab Results   Component Value Date    CALCIUM 9.2 05/15/2024    PHOS 4.8 05/14/2024    PROT 6.6 05/15/2024    ALBUMIN 3.7 05/15/2024    AST 20 05/15/2024    ALT 51 05/15/2024    ALKPHOS 112 05/15/2024    BILITOT 0.3 05/15/2024       LIPID PANEL -   Lab Results   Component Value Date    CHOL 159 05/31/2022    TRIG 110 05/31/2022    HDL 66 05/31/2022    CHHDL 2.4 05/31/2022       RENAL FUNCTION PANEL -   Lab Results   Component Value Date    GLUCOSE 117 (H) 05/15/2024     05/15/2024    K 3.8 05/15/2024     05/15/2024    CO2 29 05/15/2024    ANIONGAP 11 05/15/2024    BUN 13 05/15/2024    CREATININE 0.89 05/15/2024    CALCIUM 9.2 05/15/2024    PHOS 4.8 05/14/2024    ALBUMIN 3.7 05/15/2024        No results found for: \"BNP\", \"HGBA1C\"    Vascular US upper ext (05/13/2024):    CONCLUSIONS:  Right Upper Venous: The subclavian vein demonstrates a normal spontaneous and phasic flow.  Left Upper Venous: There is acute occlusive deep " vein thrombosis visualized in the axillary, acute occlusive deep vein thrombosis visualized in the proximal subclavian, acute occlusive deep vein thrombosis visualized in the mid subclavian, acute occlusive deep vein thrombosis visualized in the distal subclavian and acute occlusive superficial venous thrombosis visualized in the basilic veins. There is acute non-occlusive deep vein thrombosis visualized in the brachial, acute non-occlusive superficial venous thrombosis visualized in the proximal cephalic and acute non-occlusive superficial venous thrombosis visualized in the mid cephalic veins. Acute non-occlusive thrombus noted in left cephalic at antecubital fossa.     Echo 04/24/2024     CONCLUSIONS:   1. Left ventricular systolic function is normal with a 55-60% estimated ejection fraction.   2. Abnormal septal motion consistent with RV pacemaker.   3. Aortic valve sclerosis.    Diagnostic review: I have personally reviewed the result(s)     Assessment/Plan   Diagnoses and all orders for this visit:  Pacemaker  -     ECG 12 lead (Clinic Performed)    Patient with complete heart block s/p PPM ([facility] - 04/25/2024) followed by RV lead malfunction requiring RV lead removal and implant of new RV lead (05/03/2024). After discharged from his procedure on 05/03/2024, patient developed left arm edema. Patient had an upper extremity venous duplex, which was positive for acute DVT involving his left Subclavian, Axillary, Brachial, Cephalic & Basilic veins.   Patient was admitted (05/13), was treated with heparin and discharged on Eliquis. He has been doing well, no more signs of left arm edema.   He complained of some left shoulder stiffness, I instructed him to start moving his left arm. Will schedule a follow up.    Sandoval Geronimo MD

## 2024-06-15 LAB
ATRIAL RATE: 69 BPM
ATRIAL RATE: 69 BPM
P AXIS: 71 DEGREES
P AXIS: 73 DEGREES
P OFFSET: 177 MS
P OFFSET: 181 MS
P ONSET: 117 MS
P ONSET: 117 MS
PR INTERVAL: 220 MS
PR INTERVAL: 220 MS
Q ONSET: 227 MS
Q ONSET: 227 MS
QRS COUNT: 11 BEATS
QRS COUNT: 11 BEATS
QRS DURATION: 176 MS
QRS DURATION: 178 MS
QT INTERVAL: 424 MS
QT INTERVAL: 430 MS
QTC CALCULATION(BAZETT): 454 MS
QTC CALCULATION(BAZETT): 460 MS
QTC FREDERICIA: 444 MS
QTC FREDERICIA: 450 MS
R AXIS: 269 DEGREES
R AXIS: 270 DEGREES
T AXIS: 50 DEGREES
T AXIS: 56 DEGREES
T OFFSET: 439 MS
T OFFSET: 442 MS
VENTRICULAR RATE: 69 BPM
VENTRICULAR RATE: 69 BPM

## 2024-07-26 ENCOUNTER — HOSPITAL ENCOUNTER (OUTPATIENT)
Dept: CARDIOLOGY | Facility: CLINIC | Age: 69
Discharge: HOME | End: 2024-07-26

## 2024-07-26 DIAGNOSIS — Z95.0 PACEMAKER: ICD-10-CM

## 2024-07-26 DIAGNOSIS — R00.1 BRADYCARDIA ON ECG: ICD-10-CM

## 2024-08-19 ENCOUNTER — OFFICE VISIT (OUTPATIENT)
Dept: CARDIOLOGY | Facility: HOSPITAL | Age: 69
End: 2024-08-19

## 2024-08-19 VITALS
OXYGEN SATURATION: 95 % | WEIGHT: 173.28 LBS | HEART RATE: 65 BPM | DIASTOLIC BLOOD PRESSURE: 49 MMHG | BODY MASS INDEX: 27.14 KG/M2 | SYSTOLIC BLOOD PRESSURE: 137 MMHG

## 2024-08-19 DIAGNOSIS — I44.2 COMPLETE HEART BLOCK (MULTI): ICD-10-CM

## 2024-08-19 PROCEDURE — 99214 OFFICE O/P EST MOD 30 MIN: CPT | Performed by: STUDENT IN AN ORGANIZED HEALTH CARE EDUCATION/TRAINING PROGRAM

## 2024-08-19 PROCEDURE — 1123F ACP DISCUSS/DSCN MKR DOCD: CPT | Performed by: STUDENT IN AN ORGANIZED HEALTH CARE EDUCATION/TRAINING PROGRAM

## 2024-08-19 PROCEDURE — 99214 OFFICE O/P EST MOD 30 MIN: CPT | Mod: 25 | Performed by: STUDENT IN AN ORGANIZED HEALTH CARE EDUCATION/TRAINING PROGRAM

## 2024-08-19 PROCEDURE — 93010 ELECTROCARDIOGRAM REPORT: CPT | Performed by: STUDENT IN AN ORGANIZED HEALTH CARE EDUCATION/TRAINING PROGRAM

## 2024-08-19 PROCEDURE — 1159F MED LIST DOCD IN RCRD: CPT | Performed by: STUDENT IN AN ORGANIZED HEALTH CARE EDUCATION/TRAINING PROGRAM

## 2024-08-19 PROCEDURE — 93005 ELECTROCARDIOGRAM TRACING: CPT | Performed by: STUDENT IN AN ORGANIZED HEALTH CARE EDUCATION/TRAINING PROGRAM

## 2024-08-19 ASSESSMENT — ENCOUNTER SYMPTOMS
FEVER: 0
CONFUSION: 0
SHORTNESS OF BREATH: 0
DIZZINESS: 0

## 2024-08-19 NOTE — PROGRESS NOTES
Chief Complaint:   No chief complaint on file.     History Of Present Illness:    Deonte Leavitt is a 69 y.o. male with complete heart block s/p PPM ([facility] - 04/25/2024) followed by RV lead malfunction requiring RV lead removal and implant of new RV lead (05/03/2024). Patient also has a history of seizures on Keppra.    After discharged from his procedure on 05/03/2024 (see above), patient developed left arm edema. Patient had an upper extremity venous duplex, which was positive for acute DVT involving his left Subclavian, Axillary, Brachial, Cephalic & Basilic veins.   Patient was admitted (05/13), was treated with heparin and discharged on Eliquis. He has been doing well, no more signs of left arm edema.     Last Recorded Vitals:  Vitals:    08/19/24 0803   BP: (!) 137/49   Pulse: 65   SpO2: 95%   Weight: 78.6 kg (173 lb 4.5 oz)       Past Medical History:  He has a past medical history of Seizures (Multi).    Past Surgical History:  He has a past surgical history that includes Cardiac electrophysiology procedure (N/A, 4/24/2024); Cardiac electrophysiology procedure (N/A, 4/25/2024); and Cardiac electrophysiology procedure (Left, 5/3/2024).      Social History:  He reports that he has been smoking cigarettes. He has never used smokeless tobacco. He reports that he does not currently use alcohol. He reports current drug use. Drug: Marijuana.    Family History:  No family history on file.     Allergies:  Patient has no known allergies.    Outpatient Medications:  Current Outpatient Medications   Medication Instructions    apixaban (Eliquis) 5 mg (74 tabs) tablet Take 2 tablets (10 mg) by mouth 2 times a day for 7 days, then take 1 tablet (5 mg) by mouth 2 times a day.    lamoTRIgine (LAMICTAL) 50 mg, oral, 2 times daily, Per pt, cut his dose down without speaking with provider       Review of Systems   Constitutional:  Negative for fever.   Respiratory:  Negative for shortness of breath.    Cardiovascular:         As  "per history.   Neurological:  Negative for dizziness and syncope.   Psychiatric/Behavioral:  Negative for confusion.       Physical Exam  Constitutional:       Appearance: Normal appearance.   Cardiovascular:      Rate and Rhythm: Normal rate and regular rhythm.      Heart sounds: No murmur heard.     No friction rub. No gallop.   Pulmonary:      Effort: Pulmonary effort is normal.      Breath sounds: Normal breath sounds.   Abdominal:      Palpations: Abdomen is soft.   Musculoskeletal:      Cervical back: Neck supple.   Neurological:      Mental Status: He is alert.   Psychiatric:         Mood and Affect: Mood normal.         Behavior: Behavior normal.           Last Labs:  CBC -  Lab Results   Component Value Date    WBC 7.0 05/15/2024    HGB 13.2 (L) 05/15/2024    HCT 41.1 05/15/2024    MCV 93 05/15/2024     (H) 05/15/2024       CMP -  Lab Results   Component Value Date    CALCIUM 9.2 05/15/2024    PHOS 4.8 05/14/2024    PROT 6.6 05/15/2024    ALBUMIN 3.7 05/15/2024    AST 20 05/15/2024    ALT 51 05/15/2024    ALKPHOS 112 05/15/2024    BILITOT 0.3 05/15/2024       LIPID PANEL -   Lab Results   Component Value Date    CHOL 159 05/31/2022    TRIG 110 05/31/2022    HDL 66 05/31/2022    CHHDL 2.4 05/31/2022       RENAL FUNCTION PANEL -   Lab Results   Component Value Date    GLUCOSE 117 (H) 05/15/2024     05/15/2024    K 3.8 05/15/2024     05/15/2024    CO2 29 05/15/2024    ANIONGAP 11 05/15/2024    BUN 13 05/15/2024    CREATININE 0.89 05/15/2024    CALCIUM 9.2 05/15/2024    PHOS 4.8 05/14/2024    ALBUMIN 3.7 05/15/2024        No results found for: \"BNP\", \"HGBA1C\"    Vascular US upper ext (05/13/2024):    CONCLUSIONS:  Right Upper Venous: The subclavian vein demonstrates a normal spontaneous and phasic flow.  Left Upper Venous: There is acute occlusive deep vein thrombosis visualized in the axillary, acute occlusive deep vein thrombosis visualized in the proximal subclavian, acute occlusive deep " vein thrombosis visualized in the mid subclavian, acute occlusive deep vein thrombosis visualized in the distal subclavian and acute occlusive superficial venous thrombosis visualized in the basilic veins. There is acute non-occlusive deep vein thrombosis visualized in the brachial, acute non-occlusive superficial venous thrombosis visualized in the proximal cephalic and acute non-occlusive superficial venous thrombosis visualized in the mid cephalic veins. Acute non-occlusive thrombus noted in left cephalic at antecubital fossa.     Echo 04/24/2024     CONCLUSIONS:   1. Left ventricular systolic function is normal with a 55-60% estimated ejection fraction.   2. Abnormal septal motion consistent with RV pacemaker.   3. Aortic valve sclerosis.    Diagnostic review: I have personally reviewed the result(s)     Assessment/Plan   Diagnoses and all orders for this visit:  Complete heart block (Multi)  -     ECG 12 lead (Clinic Performed)    Patient with complete heart block s/p PPM ([facility] - 04/25/2024) followed by RV lead malfunction requiring RV lead removal and implant of new RV lead (05/03/2024). After discharged from his procedure on 05/03/2024, patient developed left arm edema. Patient had an upper extremity venous duplex, which was positive for acute DVT involving his left Subclavian, Axillary, Brachial, Cephalic & Basilic veins.   Patient was admitted (05/13), was treated with heparin and discharged on Eliquis. He has been doing well, no more signs of left arm edema.   Today, patient comes in with no complaints concerning for DVT. He does complain of some FERNÁNDEZ. I interrogated his device, it showed adequate numbers, he is ~5% RV paced. I switched him from DDD to DDDR. Will plan to stop his Eliquis in one month and schedule a follow up in 3 months.    Sandoval Geronimo MD

## 2024-08-25 LAB
ATRIAL RATE: 69 BPM
P AXIS: 77 DEGREES
P OFFSET: 176 MS
P ONSET: 116 MS
PR INTERVAL: 220 MS
Q ONSET: 226 MS
QRS COUNT: 12 BEATS
QRS DURATION: 174 MS
QT INTERVAL: 436 MS
QTC CALCULATION(BAZETT): 467 MS
QTC FREDERICIA: 457 MS
R AXIS: 270 DEGREES
T AXIS: 54 DEGREES
T OFFSET: 444 MS
VENTRICULAR RATE: 69 BPM

## 2024-09-12 ENCOUNTER — HOSPITAL ENCOUNTER (OUTPATIENT)
Dept: CARDIOLOGY | Facility: HOSPITAL | Age: 69
Discharge: HOME | End: 2024-09-12

## 2024-09-12 DIAGNOSIS — Z95.0 PACEMAKER: ICD-10-CM

## 2024-09-12 DIAGNOSIS — R00.1 BRADYCARDIA ON ECG: ICD-10-CM

## 2024-09-12 PROCEDURE — 93280 PM DEVICE PROGR EVAL DUAL: CPT

## 2024-10-25 ENCOUNTER — HOSPITAL ENCOUNTER (OUTPATIENT)
Dept: CARDIOLOGY | Facility: CLINIC | Age: 69
Discharge: HOME | End: 2024-10-25

## 2024-10-25 DIAGNOSIS — Z95.0 PACEMAKER: ICD-10-CM

## 2024-10-25 DIAGNOSIS — R00.1 BRADYCARDIA ON ECG: ICD-10-CM

## 2024-10-25 PROCEDURE — 93296 REM INTERROG EVL PM/IDS: CPT

## 2024-12-14 NOTE — PROGRESS NOTES
[facility]    Patient Name: Deonte Leavitt  Patient : 1955    Referred for  Complete Heart Block with PPM     History of Present Illness:  Deonte Leavitt is a 69 y.o. year old male patient with:    CHB  PPM : s/p 2024 [facility]  Seizures  DVT : on Eliquis, involving his left Subclavian, Axillary, Brachial, Cephalic & Basilic veins.     Past Medical History:  He has a past medical history of Seizures (Multi).    Past Surgical History:  He has a past surgical history that includes Cardiac electrophysiology procedure (N/A, 2024); Cardiac electrophysiology procedure (N/A, 2024); and Cardiac electrophysiology procedure (Left, 5/3/2024).      Social History:  He reports that he has been smoking cigarettes. He has never used smokeless tobacco. He reports that he does not currently use alcohol. He reports current drug use. Drug: Marijuana.    Family History:  No family history on file.     Allergies:  Patient has no known allergies.    Outpatient Medications:  Current Outpatient Medications   Medication Instructions    apixaban (Eliquis) 5 mg (74 tabs) tablet Take 2 tablets (10 mg) by mouth 2 times a day for 7 days, then take 1 tablet (5 mg) by mouth 2 times a day.    lamoTRIgine (LAMICTAL) 50 mg, oral, 2 times daily, Per pt, cut his dose down without speaking with provider        ROS:  A 14 point review of systems was done and is negative other than as stated in HPI    Physical Exam    Vitals:  There were no vitals taken for this visit.       Labs:   CBC  Lab Results   Component Value Date    WBC 7.0 05/15/2024    HGB 13.2 (L) 05/15/2024    HCT 41.1 05/15/2024    MCV 93 05/15/2024     (H) 05/15/2024        Renal Function Panel  Lab Results   Component Value Date    GLUCOSE 117 (H) 05/15/2024     05/15/2024    K 3.8 05/15/2024     05/15/2024    CO2 29 05/15/2024    ANIONGAP 11 05/15/2024    BUN 13 05/15/2024    CREATININE 0.89 05/15/2024     CALCIUM 9.2 05/15/2024        CMP  Lab Results   Component Value Date    CALCIUM 9.2 05/15/2024    PHOS 4.8 05/14/2024    PROT 6.6 05/15/2024    ALBUMIN 3.7 05/15/2024    AST 20 05/15/2024    ALT 51 05/15/2024    ALKPHOS 112 05/15/2024    BILITOT 0.3 05/15/2024       TSH  Lab Results   Component Value Date    TSH 1.99 02/11/2022          Cardiac Testing:  ECG  12/16/2024      Echocardiogram  04/24/2024  PHYSICIAN INTERPRETATION:  Left Ventricle: The left ventricular systolic function is normal, with an estimated ejection fraction of 55-60%. There are no regional wall motion abnormalities. The left ventricular cavity size is normal. Abnormal (paradoxical) septal motion, consistent with RV pacemaker. Spectral Doppler shows a normal pattern of left ventricular diastolic filling.  Left Atrium: The left atrium is normal in size.  Right Ventricle: The right ventricle is normal in size. There is normal right ventricular global systolic function.  Right Atrium: The right atrium is upper limits of normal in size. There is a device visualized in the right atrium.  Aortic Valve: The aortic valve is probably trileaflet. There is mild aortic valve cusp calcification. There is evidence of mildly elevated transaortic gradients consistent with sclerosis of the aortic valve.  There is no evidence of aortic valve regurgitation. The peak instantaneous gradient of the aortic valve is 14.2 mmHg. The mean gradient of the aortic valve is 7.3 mmHg.  Mitral Valve: The mitral valve is normal in structure. There is trace mitral valve regurgitation.  Tricuspid Valve: The tricuspid valve is structurally normal. There is trace tricuspid regurgitation. The right ventricular systolic pressure is unable to be estimated.  Pulmonic Valve: The pulmonic valve is not well visualized. The pulmonic valve regurgitation was not well visualized.  Pericardium: There is no pericardial effusion noted.  Aorta: The aortic root is normal.  Systemic Veins: The  inferior vena cava appears to be of normal size. There is IVC inspiratory collapse greater than 50%.  In comparison to the previous echocardiogram(s): There are no prior studies on this patient for comparison purposes.        CONCLUSIONS:   1. Left ventricular systolic function is normal with a 55-60% estimated ejection fraction.   2. Abnormal septal motion consistent with RV pacemaker.   3. Aortic valve sclerosis.    ECHO  02/18/2022  FINDINGS:     Procedural Information: Exam quality: good  LV:       The left ventricular chamber size is normal. There is normal            left ventricular systolic function. Estimated ejection            fraction is 60%.  RV:       Right ventricle size is normal. Right ventricle function is            normal. No right ventricle hypertrophy present. No            abnormalities visualized in the right ventricle.  LA:       The left atrial size is normal.  LEW:     There is no clinically significant pericardial effusion.  AO:       Aorta is normal.  AV:       No evidence of aortic regurgitation.  MV:       No mitral regurgitation.  TV:       No evidence of tricuspid regurgitation.      Assessment:       Plan:

## 2024-12-16 ENCOUNTER — APPOINTMENT (OUTPATIENT)
Dept: CARDIOLOGY | Facility: HOSPITAL | Age: 69
End: 2024-12-16

## 2025-01-13 ENCOUNTER — APPOINTMENT (OUTPATIENT)
Dept: CARDIOLOGY | Facility: HOSPITAL | Age: 70
End: 2025-01-13

## 2025-01-27 ENCOUNTER — HOSPITAL ENCOUNTER (OUTPATIENT)
Dept: CARDIOLOGY | Facility: CLINIC | Age: 70
Discharge: HOME | End: 2025-01-27

## 2025-01-27 DIAGNOSIS — Z95.0 PRESENCE OF CARDIAC PACEMAKER: ICD-10-CM

## 2025-01-27 DIAGNOSIS — I44.2 CHB (COMPLETE HEART BLOCK): ICD-10-CM

## 2025-01-27 PROCEDURE — 93296 REM INTERROG EVL PM/IDS: CPT

## 2025-04-28 ENCOUNTER — HOSPITAL ENCOUNTER (OUTPATIENT)
Dept: CARDIOLOGY | Facility: CLINIC | Age: 70
Discharge: HOME | End: 2025-04-28

## 2025-04-28 DIAGNOSIS — I44.2 CHB (COMPLETE HEART BLOCK): ICD-10-CM

## 2025-04-28 DIAGNOSIS — Z95.0 PRESENCE OF CARDIAC PACEMAKER: ICD-10-CM

## 2025-04-28 PROCEDURE — 93296 REM INTERROG EVL PM/IDS: CPT

## 2025-04-28 PROCEDURE — 93294 REM INTERROG EVL PM/LDLS PM: CPT | Performed by: STUDENT IN AN ORGANIZED HEALTH CARE EDUCATION/TRAINING PROGRAM

## 2025-07-28 ENCOUNTER — HOSPITAL ENCOUNTER (OUTPATIENT)
Dept: CARDIOLOGY | Facility: CLINIC | Age: 70
Discharge: HOME | End: 2025-07-28

## 2025-07-28 DIAGNOSIS — I44.2 CHB (COMPLETE HEART BLOCK): ICD-10-CM

## 2025-07-28 DIAGNOSIS — Z95.0 PRESENCE OF CARDIAC PACEMAKER: ICD-10-CM

## 2025-07-28 PROCEDURE — 93294 REM INTERROG EVL PM/LDLS PM: CPT | Performed by: STUDENT IN AN ORGANIZED HEALTH CARE EDUCATION/TRAINING PROGRAM

## 2025-07-28 PROCEDURE — 93296 REM INTERROG EVL PM/IDS: CPT

## (undated) DEVICE — ACCESS SYSTEM, PINNACLE PRECISION, 5FR X 10CM, ECHOGENIC NEEDLE

## (undated) DEVICE — INTRODUCER, SHEATH, FAST-CATH, 6.5FR X 12CM, C-LOCK

## (undated) DEVICE — ENVELOPE, ANTIBACTERIAL, AIGIS RX TYRX, ABSORBABLE, MED

## (undated) DEVICE — CPS LOCATOR 3D DELIVERY CATHETER AND DILATOR, MEDIUM (MFR'D BY CENTERPOINT SYSTEMS)

## (undated) DEVICE — SEALANT, HEMOSTATIC, FLOSEAL, 10 ML

## (undated) DEVICE — ANGIO KIT, LEFT HEART, LF, CUSTOM

## (undated) DEVICE — ACCESS KIT, MINI MAK, 4 FR X 10CM, 0.018 X 40CM, NITINOL/PLATINUM, STD NEEDLE

## (undated) DEVICE — COVER, PROBE, PULL UP ULTRASOUND KIT, 5 X 48

## (undated) DEVICE — CATHETER, PACING, BIPOLAR, 5FR, 90CM

## (undated) DEVICE — INTRODUCER SYSTEM, PRELUDE SNAP, SPLITTABLE, 9 FR X 13 CM

## (undated) DEVICE — CABLE, SURGICAL, SM CLIP

## (undated) DEVICE — Device

## (undated) DEVICE — AGILIS HISPRO SLITTER

## (undated) DEVICE — KIT, STYLET, 58CM

## (undated) DEVICE — GUIDEWIRE, INQWIRE, 3MM J, .035, 150

## (undated) DEVICE — INTRODUCER SYSTEM, PRELUDE SNAP, SPLITTABLE, HEMOSTATIC, 6FR

## (undated) DEVICE — GLIDEWIRE, ANGLE, STANDARD, .035 X 80CM

## (undated) DEVICE — WRENCH, HEX

## (undated) DEVICE — ELECTRODE, QUICK-COMBO, EDGE SYSTEM, REDI PACK

## (undated) DEVICE — ENVELOPE, ANTIBACTERIAL, AIGIS RX TYRX, ABSORBABLE, LRG